# Patient Record
Sex: FEMALE | Race: WHITE | NOT HISPANIC OR LATINO | Employment: OTHER | URBAN - METROPOLITAN AREA
[De-identification: names, ages, dates, MRNs, and addresses within clinical notes are randomized per-mention and may not be internally consistent; named-entity substitution may affect disease eponyms.]

---

## 2018-07-19 ENCOUNTER — OFFICE VISIT (OUTPATIENT)
Dept: MEDICAL GROUP | Facility: PHYSICIAN GROUP | Age: 83
End: 2018-07-19
Payer: MEDICARE

## 2018-07-19 VITALS
OXYGEN SATURATION: 95 % | HEIGHT: 65 IN | SYSTOLIC BLOOD PRESSURE: 124 MMHG | TEMPERATURE: 98.1 F | WEIGHT: 153 LBS | BODY MASS INDEX: 25.49 KG/M2 | RESPIRATION RATE: 16 BRPM | DIASTOLIC BLOOD PRESSURE: 72 MMHG | HEART RATE: 77 BPM

## 2018-07-19 DIAGNOSIS — J43.8 OTHER EMPHYSEMA (HCC): ICD-10-CM

## 2018-07-19 DIAGNOSIS — E03.9 ACQUIRED HYPOTHYROIDISM: ICD-10-CM

## 2018-07-19 DIAGNOSIS — R73.09 ELEVATED GLUCOSE: ICD-10-CM

## 2018-07-19 DIAGNOSIS — M15.9 PRIMARY OSTEOARTHRITIS INVOLVING MULTIPLE JOINTS: ICD-10-CM

## 2018-07-19 DIAGNOSIS — E78.2 MIXED HYPERLIPIDEMIA: ICD-10-CM

## 2018-07-19 DIAGNOSIS — R60.0 BILATERAL LOWER EXTREMITY EDEMA: ICD-10-CM

## 2018-07-19 DIAGNOSIS — D22.9 MULTIPLE NEVI: ICD-10-CM

## 2018-07-19 DIAGNOSIS — I65.23 ATHEROSCLEROSIS OF BOTH CAROTID ARTERIES: ICD-10-CM

## 2018-07-19 DIAGNOSIS — K21.9 GASTROESOPHAGEAL REFLUX DISEASE WITHOUT ESOPHAGITIS: ICD-10-CM

## 2018-07-19 PROCEDURE — 99204 OFFICE O/P NEW MOD 45 MIN: CPT | Performed by: NURSE PRACTITIONER

## 2018-07-19 RX ORDER — ATORVASTATIN CALCIUM 20 MG/1
20 TABLET, FILM COATED ORAL DAILY
Refills: 2 | COMMUNITY
Start: 2018-05-02 | End: 2019-05-28 | Stop reason: SDUPTHER

## 2018-07-19 RX ORDER — MELOXICAM 7.5 MG/1
7.5 TABLET ORAL DAILY
Refills: 0 | COMMUNITY
Start: 2018-04-30 | End: 2018-08-30 | Stop reason: SDUPTHER

## 2018-07-19 RX ORDER — OMEPRAZOLE 20 MG/1
20 CAPSULE, DELAYED RELEASE ORAL DAILY
Refills: 1 | COMMUNITY
Start: 2018-06-15 | End: 2019-03-01 | Stop reason: SDUPTHER

## 2018-07-19 RX ORDER — ESCITALOPRAM OXALATE 10 MG/1
10 TABLET ORAL DAILY
Refills: 0 | COMMUNITY
Start: 2018-06-15 | End: 2018-09-13 | Stop reason: SDUPTHER

## 2018-07-19 RX ORDER — LEVOTHYROXINE SODIUM 0.03 MG/1
25 TABLET ORAL DAILY
Refills: 0 | COMMUNITY
Start: 2018-06-15 | End: 2019-03-01 | Stop reason: SDUPTHER

## 2018-07-19 RX ORDER — METOPROLOL SUCCINATE 25 MG/1
25 TABLET, EXTENDED RELEASE ORAL DAILY
Refills: 2 | COMMUNITY
Start: 2018-05-02 | End: 2019-04-02

## 2018-07-19 RX ORDER — BUDESONIDE AND FORMOTEROL FUMARATE DIHYDRATE 160; 4.5 UG/1; UG/1
2 AEROSOL RESPIRATORY (INHALATION) 2 TIMES DAILY
COMMUNITY
End: 2019-05-28

## 2018-07-19 RX ORDER — ERYTHROMYCIN 5 MG/G
5 OINTMENT OPHTHALMIC
Refills: 5 | COMMUNITY
Start: 2018-05-17 | End: 2019-05-28

## 2018-07-19 ASSESSMENT — PATIENT HEALTH QUESTIONNAIRE - PHQ9
5. POOR APPETITE OR OVEREATING: 0 - NOT AT ALL
CLINICAL INTERPRETATION OF PHQ2 SCORE: 4
SUM OF ALL RESPONSES TO PHQ QUESTIONS 1-9: 9

## 2018-07-19 ASSESSMENT — PAIN SCALES - GENERAL: PAINLEVEL: NO PAIN

## 2018-07-19 NOTE — ASSESSMENT & PLAN NOTE
Chronic in nature.  Stable.  Patient was decreased from 40-20 mg of atorvastatin and triglycerides are 222 on last check plan to repeat these labs may increase dose.

## 2018-07-19 NOTE — LETTER
[facility]  OSMAR PaizP.RALEXIS  [address]  [place] [address]  Fax: [phone]   Authorization for Release/Disclosure of   Protected Health Information   Name: THALIA HARDIN : 1931 SSN: [id number]   Address: [address] [address] Phone:    [phone] (home)    I authorize the entity listed below to release/disclose the PHI below to:   [facility]/ASIM Piaz.P.R.NAVDEEP and ASIM Paiz.P.RALEXIS   Provider or Entity Name:  Gastroenterology    Address   City, [facility], [facility]   Phone:  [phone]     Fax:     Reason for request: continuity of care   Information to be released:    [  ] LAST COLONOSCOPY,  including any PATH REPORT and follow-up  [  ] LAST FIT/COLOGUARD RESULT [  ] LAST DEXA  [  ] LAST MAMMOGRAM  [  ] LAST PAP  [  ] LAST LABS [  ] RETINA EXAM REPORT  [  ] IMMUNIZATION RECORDS  [ x ] Release all info      [  ] Check here and initial the line next to each item to release ALL health information INCLUDING  _____ Care and treatment for drug and / or alcohol abuse  _____ HIV testing, infection status, or AIDS  _____ Genetic Testing    DATES OF SERVICE OR TIME PERIOD TO BE DISCLOSED: _____________  I understand and acknowledge that:  * This Authorization may be revoked at any time by you in writing, except if your health information has already been used or disclosed.  * Your health information that will be used or disclosed as a result of you signing this authorization could be re-disclosed by the recipient. If this occurs, your re-disclosed health information may no longer be protected by State or Federal laws.  * You may refuse to sign this Authorization. Your refusal will not affect your ability to obtain treatment.  * This Authorization becomes effective upon signing and will  on (date) __________.      If no date is indicated, this Authorization will  one (1) year from the signature date.    Name: Thalia  Sol    Signature:   Date:     7/19/2018       PLEASE FAX REQUESTED RECORDS BACK TO: [phone]

## 2018-07-19 NOTE — LETTER
[facility]  OSMAR PaizP.RALEXIS  [address]  [place] [address]  Fax: [phone]   Authorization for Release/Disclosure of   Protected Health Information   Name: THALIA HARDIN : 1931 SSN: [id number]   Address: [address] [address] Phone:    [phone] (home)    I authorize the entity listed below to release/disclose the PHI below to:   [facility]/ASIM Paiz.P.R.MAGGIE. and ASIM Paiz.P.RALEXIS   Provider or Entity Name:  Domingo Garcia    Address   City, [facility], [facility]   Phone:  [phone]    Fax:     Reason for request: continuity of care   Information to be released:    [  ] LAST COLONOSCOPY,  including any PATH REPORT and follow-up  [  ] LAST FIT/COLOGUARD RESULT [  ] LAST DEXA  [  ] LAST MAMMOGRAM  [  ] LAST PAP  [  ] LAST LABS [  ] RETINA EXAM REPORT  [  ] IMMUNIZATION RECORDS  [x  ] Release all info      [  ] Check here and initial the line next to each item to release ALL health information INCLUDING  _____ Care and treatment for drug and / or alcohol abuse  _____ HIV testing, infection status, or AIDS  _____ Genetic Testing    DATES OF SERVICE OR TIME PERIOD TO BE DISCLOSED: _____________  I understand and acknowledge that:  * This Authorization may be revoked at any time by you in writing, except if your health information has already been used or disclosed.  * Your health information that will be used or disclosed as a result of you signing this authorization could be re-disclosed by the recipient. If this occurs, your re-disclosed health information may no longer be protected by State or Federal laws.  * You may refuse to sign this Authorization. Your refusal will not affect your ability to obtain treatment.  * This Authorization becomes effective upon signing and will  on (date) __________.      If no date is indicated, this Authorization will  one (1) year from the signature date.    Name: Thalia  Sol    Signature:   Date:     7/19/2018       PLEASE FAX REQUESTED RECORDS BACK TO: [phone]

## 2018-07-19 NOTE — ASSESSMENT & PLAN NOTE
Chronic in nature.  Stable.  Patient uses over-the-counter medications as needed.  Multiple joints, shoulder, neck. Worse on left.  States that she does have some discomfort in her lower neck area, left shoulder.  States that over-the-counter medication works well.

## 2018-07-19 NOTE — ASSESSMENT & PLAN NOTE
"Chronic in nature.  Stable.  Patient takes Lasix for this issue states that she takes it \"most days\".  States she is unsure if she was supposed to be taking it daily states that they have noticed some issues with blood pressures being too low.  Plan to obtain cardiology records.  "

## 2018-07-19 NOTE — LETTER
[facility]  OSMAR PaizP.RALEXIS  [address]  [place] [address]  Fax: [phone]   Authorization for Release/Disclosure of   Protected Health Information   Name: THALIA HARDIN : 1931 SSN: [id number]   Address: [address] [address] Phone:    [phone] (home)    I authorize the entity listed below to release/disclose the PHI below to:   [facility]/ASIM Paiz.P.R.NAVDEEP and ASIM Paiz.P.RALEXIS   Provider or Entity Name:  Og Sheehan    [facility]   Phone:  [phone]    Fax:     Reason for request: continuity of care   Information to be released:    [  ] LAST COLONOSCOPY,  including any PATH REPORT and follow-up  [  ] LAST FIT/COLOGUARD RESULT [  ] LAST DEXA  [  ] LAST MAMMOGRAM  [  ] LAST PAP  [  ] LAST LABS [  ] RETINA EXAM REPORT  [  ] IMMUNIZATION RECORDS  [ x ] Release all info      [  ] Check here and initial the line next to each item to release ALL health information INCLUDING  _____ Care and treatment for drug and / or alcohol abuse  _____ HIV testing, infection status, or AIDS  _____ Genetic Testing    DATES OF SERVICE OR TIME PERIOD TO BE DISCLOSED: _____________  I understand and acknowledge that:  * This Authorization may be revoked at any time by you in writing, except if your health information has already been used or disclosed.  * Your health information that will be used or disclosed as a result of you signing this authorization could be re-disclosed by the recipient. If this occurs, your re-disclosed health information may no longer be protected by State or Federal laws.  * You may refuse to sign this Authorization. Your refusal will not affect your ability to obtain treatment.  * This Authorization becomes effective upon signing and will  on (date) __________.      If no date is indicated, this Authorization will  one (1) year from the signature date.    Name: Thalia  Sol    Signature:   Date:     7/19/2018       PLEASE FAX REQUESTED RECORDS BACK TO: [phone]

## 2018-07-19 NOTE — ASSESSMENT & PLAN NOTE
"Chronic in nature.  Stable.  Patient uses Symbicort, \"sometimes\".  Denies issues with shortness of breath, cough, fever, chills.  Quit smoking in 2013.  "

## 2018-07-19 NOTE — LETTER
[facility]  OSMAR PaizP.RALEXIS  [address]  [place] [address]  Fax: [phone]   Authorization for Release/Disclosure of   Protected Health Information   Name: THALIA HARDIN : 1931 SSN: [id number]   Address: [address] [address] Phone:    [phone] (home)    I authorize the entity listed below to release/disclose the PHI below to:   [facility]/Marcelo Isaacs A.P.R.MAGGIE. and ASIM Paiz.P.RALEXIS   Provider or Entity Name:  Ling Montemayor    Address   City, [facility], [facility]   Phone:  [phone]    Fax:     Reason for request: continuity of care   Information to be released:    [  ] LAST COLONOSCOPY,  including any PATH REPORT and follow-up  [  ] LAST FIT/COLOGUARD RESULT [  ] LAST DEXA  [  ] LAST MAMMOGRAM  [  ] LAST PAP  [  ] LAST LABS [  ] RETINA EXAM REPORT  [  ] IMMUNIZATION RECORDS  [ x ] Release all info      [  ] Check here and initial the line next to each item to release ALL health information INCLUDING  _____ Care and treatment for drug and / or alcohol abuse  _____ HIV testing, infection status, or AIDS  _____ Genetic Testing    DATES OF SERVICE OR TIME PERIOD TO BE DISCLOSED: _____________  I understand and acknowledge that:  * This Authorization may be revoked at any time by you in writing, except if your health information has already been used or disclosed.  * Your health information that will be used or disclosed as a result of you signing this authorization could be re-disclosed by the recipient. If this occurs, your re-disclosed health information may no longer be protected by State or Federal laws.  * You may refuse to sign this Authorization. Your refusal will not affect your ability to obtain treatment.  * This Authorization becomes effective upon signing and will  on (date) __________.      If no date is indicated, this Authorization will  one (1) year from the signature date.    Name: Thalia  Sol    Signature:   Date:     7/19/2018       PLEASE FAX REQUESTED RECORDS BACK TO: [phone]

## 2018-07-19 NOTE — PROGRESS NOTES
"Chief Complaint   Patient presents with   • Establish Care     New Patient        HISTORY OF THE PRESENT ILLNESS: This is a 87 y.o. female new patient to our practice. This pleasant patient is here today to establish care, patient is a poor historian.     Osteoarthritis of multiple joints  Chronic in nature.  Stable.  Patient uses over-the-counter medications as needed.  Multiple joints, shoulder, neck. Worse on left.  States that she does have some discomfort in her lower neck area, left shoulder.  States that over-the-counter medication works well.    Acquired hypothyroidism  Chronic in nature. Stable. Patient is taking Synthroid 25 mcg daily. Taking medicine as directed. Denies palpitations, skin changes, temperature intolerance, changes in bowel habits.      Atherosclerosis of both carotid arteries  Chronic in nature.  Patient states that her last ultrasound indicated <40% occlusion.  Patient is unsure follow-up was recommended for this.  Patient is seeing cardiology but patient is not sure why she was seeing cardiology.  States that they were following her atherosclerosis.    Bilateral lower extremity edema  Chronic in nature.  Stable.  Patient takes Lasix for this issue states that she takes it \"most days\".  States she is unsure if she was supposed to be taking it daily states that they have noticed some issues with blood pressures being too low.  Plan to obtain cardiology records.    Gastroesophageal reflux disease without esophagitis  Taking medication daily. No early satiety, unintentional weight loss, choking, persistent burning pain in chest or upper abdomen.      Mixed hyperlipidemia  Chronic in nature.  Stable.  Patient was decreased from 40-20 mg of atorvastatin and triglycerides are 222 on last check plan to repeat these labs may increase dose.    Other emphysema (HCC)  Chronic in nature.  Stable.  Patient uses Symbicort, \"sometimes\".  Denies issues with shortness of breath, cough, fever, chills.  " Quit smoking in .      Past Medical History:   Diagnosis Date   • COPD (chronic obstructive pulmonary disease) (HCC)    • GERD (gastroesophageal reflux disease)    • Heart palpitations    • Hyperlipidemia    • Osteoarthritis    • Thyroid disease        Past Surgical History:   Procedure Laterality Date   • HIP REPLACEMENT, TOTAL Left        Family Status   Relation Status   • Mother      Family History   Problem Relation Age of Onset   • Stroke Mother 69       Social History   Substance Use Topics   • Smoking status: Former Smoker     Types: Cigarettes     Quit date: 2013   • Smokeless tobacco: Never Used   • Alcohol use 6.0 oz/week     10 Glasses of wine per week       Allergies: Penicillins    Current Outpatient Prescriptions Ordered in [facility]   Medication Sig Dispense Refill   • omeprazole (PRILOSEC) 20 MG delayed-release capsule Take 20 mg by mouth every day.  1   • meloxicam (MOBIC) 7.5 MG Tab Take 7.5 mg by mouth every day.  0   • levothyroxine (SYNTHROID) 25 MCG Tab Take 25 mcg by mouth every day.  0   • metoprolol SR (TOPROL XL) 25 MG TABLET SR 24 HR Take 25 mg by mouth every day.  2   • atorvastatin (LIPITOR) 20 MG Tab Take 20 mg by mouth every day.  2   • escitalopram (LEXAPRO) 10 MG Tab Take 10 mg by mouth every day.  0   • erythromycin 5 MG/GM Ointment 5 mg.  5   • budesonide-formoterol (SYMBICORT) 160-4.5 MCG/ACT Aerosol Inhale 2 Puffs by mouth 2 Times a Day.       No current [facility]-ordered facility-administered medications on file.        Review of Systems   Constitutional:  Negative for fever, chills, weight loss and malaise/fatigue.   HENT:  Negative for ear pain, nosebleeds, congestion, sore throat and neck pain.    Eyes:  Negative for blurred vision.   Respiratory:  Negative for cough, sputum production, shortness of breath and wheezing.    Cardiovascular:  Negative for chest pain, palpitations, orthopnea and leg swelling.   Gastrointestinal:  Negative for heartburn, nausea, vomiting  "and abdominal pain.   Genitourinary:  Negative for dysuria, urgency and frequency.   Musculoskeletal: Positive for myalgias, back pain and joint pain.   Skin:  Negative for rash and itching.   Neurological:  Negative for dizziness, tingling, tremors, sensory change, focal weakness and headaches.   Endo/Heme/Allergies:  Does not bruise/bleed easily.   Psychiatric/Behavioral:  Negative for depression, anxiety, or memory loss.     All other systems reviewed and are negative except as in HPI.    Exam: Blood pressure 124/72, pulse 77, temperature 36.7 °C (98.1 °F), resp. rate 16, height 1.651 m (5' 5\"), weight 69.4 kg (153 lb), SpO2 95 %, not currently breastfeeding.  General:  Normal appearing. No distress.  HEENT:  Normocephalic. Eyes conjunctiva clear lids without ptosis, pupils equal and reactive to light accommodation, ears normal shape and contour, canals are clear bilaterally, tympanic membranes are benign, nasal mucosa benign, oropharynx is without erythema, edema or exudates.   Neck:  Supple without JVD or bruit. Thyroid is not enlarged.  Pulmonary:  Clear to ausculation.  Normal effort. No rales, ronchi, or wheezing.  Cardiovascular:  Regular rate and rhythm without murmur. Carotid and radial pulses are intact and equal bilaterally.  Neurologic:  Grossly nonfocal  Lymph:  No cervical, supraclavicular or axillary lymph nodes are palpable  Skin:  Warm and dry.  No obvious lesions.  Musculoskeletal:  Normal gait. No extremity cyanosis, clubbing, or edema.  Psych:  Normal mood and affect. Alert and oriented x3. Judgment and insight is normal.    PLAN:    1. Primary osteoarthritis involving multiple joints  Continue current over-the-counter treatment.    2. Acquired hypothyroidism  Continue current medication, update labs  - TSH; Future    3. Mixed hyperlipidemia  Continue current medication, update labs consider increasing medication.  - COMP METABOLIC PANEL; Future  - LIPID PROFILE; Future    4. Atherosclerosis of " both carotid arteries  Will obtain cardiology records    5. Gastroesophageal reflux disease without esophagitis  Continue current medication.  - COMP METABOLIC PANEL; Future    6. Bilateral lower extremity edema  Will obtain cardiology records.  - CBC WITH DIFFERENTIAL; Future  - COMP METABOLIC PANEL; Future    7. Other emphysema (HCC)  Kidney current medication.    8. Elevated glucose  - HEMOGLOBIN A1C; Future    9. Multiple nevi  Patient has multiple nevi on her bilateral arms states she was following up with dermatology and is requesting referral.  - REFERRAL TO DERMATOLOGY    Follow-up in September to discuss labs, go over records. Patient is encouraged to be seen in the emergency room for chest pain, palpitations, shortness of breath, dizziness, severe abdominal pain or other concerning symptoms.    Please note that this dictation was created using voice recognition software. I have made every reasonable attempt to correct obvious errors, but I expect that there are errors of grammar and possibly content that I did not discover before finalizing the note.      Assessment/Plan  1. Primary osteoarthritis involving multiple joints     2. Acquired hypothyroidism  TSH   3. Mixed hyperlipidemia  COMP METABOLIC PANEL    LIPID PROFILE   4. Atherosclerosis of both carotid arteries     5. Gastroesophageal reflux disease without esophagitis  COMP METABOLIC PANEL   6. Bilateral lower extremity edema  CBC WITH DIFFERENTIAL    COMP METABOLIC PANEL   7. Other emphysema (HCC)     8. Elevated glucose  HEMOGLOBIN A1C   9. Multiple nevi  REFERRAL TO DERMATOLOGY         I have placed the below orders and discussed them with an approved delegating provider. The MA is performing the below orders under the direction of Dr. Ramey.

## 2018-07-19 NOTE — LETTER
[facility]  OSMAR PaizP.RALEXIS  [address]  [place] [address]  Fax: [phone]   Authorization for Release/Disclosure of   Protected Health Information   Name: THALIA HARDIN : 1931 SSN: [id number]   Address: [address] [address] Phone:    [phone] (home)    I authorize the entity listed below to release/disclose the PHI below to:   [facility]/ASIM Paiz.P.R.NAVDEEP and OSMAR PaizP.RALEXIS   Provider or Entity Name:  Sari Alan Edward     Address   City, State, [facility]   Phone:  [phone]    Fax:     Reason for request: continuity of care   Information to be released:    [  ] LAST COLONOSCOPY,  including any PATH REPORT and follow-up  [  ] LAST FIT/COLOGUARD RESULT [  ] LAST DEXA  [  ] LAST MAMMOGRAM  [  ] LAST PAP  [  x] LAST LABS [  ] RETINA EXAM REPORT  [  ] IMMUNIZATION RECORDS  [  ] Release all info      [  ] Check here and initial the line next to each item to release ALL health information INCLUDING  _____ Care and treatment for drug and / or alcohol abuse  _____ HIV testing, infection status, or AIDS  _____ Genetic Testing    DATES OF SERVICE OR TIME PERIOD TO BE DISCLOSED: _____________  I understand and acknowledge that:  * This Authorization may be revoked at any time by you in writing, except if your health information has already been used or disclosed.  * Your health information that will be used or disclosed as a result of you signing this authorization could be re-disclosed by the recipient. If this occurs, your re-disclosed health information may no longer be protected by State or Federal laws.  * You may refuse to sign this Authorization. Your refusal will not affect your ability to obtain treatment.  * This Authorization becomes effective upon signing and will  on (date) __________.      If no date is indicated, this Authorization will  one (1) year from the signature date.    Name: Thalia  Sol    Signature:   Date:     7/19/2018       PLEASE FAX REQUESTED RECORDS BACK TO: [phone]

## 2018-07-19 NOTE — ASSESSMENT & PLAN NOTE
Chronic in nature. Stable. Patient is taking Synthroid 25 mcg daily. Taking medicine as directed. Denies palpitations, skin changes, temperature intolerance, changes in bowel habits.

## 2018-07-19 NOTE — LETTER
[facility]  OSMAR PaizP.RALEXIS  [address]  [place] [address]  Fax: [phone]   Authorization for Release/Disclosure of   Protected Health Information   Name: THALIA HARDIN : 1931 SSN: [id number]   Address: [address] [address] Phone:    [phone] (home)    I authorize the entity listed below to release/disclose the PHI below to:   [facility]/Marcelo Isaacs A.P.R.MAGGIE. and ASIM Paiz.P.RALEXIS   Provider or Entity Name:  Urology     Address   City, State, Zip   Phone:  [phone]    Fax:     Reason for request: continuity of care   Information to be released:    [  ] LAST COLONOSCOPY,  including any PATH REPORT and follow-up  [  ] LAST FIT/COLOGUARD RESULT [  ] LAST DEXA  [  ] LAST MAMMOGRAM  [  ] LAST PAP  [  ] LAST LABS [  ] RETINA EXAM REPORT  [  ] IMMUNIZATION RECORDS  [ x ] Release all info      [  ] Check here and initial the line next to each item to release ALL health information INCLUDING  _____ Care and treatment for drug and / or alcohol abuse  _____ HIV testing, infection status, or AIDS  _____ Genetic Testing    DATES OF SERVICE OR TIME PERIOD TO BE DISCLOSED: _____________  I understand and acknowledge that:  * This Authorization may be revoked at any time by you in writing, except if your health information has already been used or disclosed.  * Your health information that will be used or disclosed as a result of you signing this authorization could be re-disclosed by the recipient. If this occurs, your re-disclosed health information may no longer be protected by State or Federal laws.  * You may refuse to sign this Authorization. Your refusal will not affect your ability to obtain treatment.  * This Authorization becomes effective upon signing and will  on (date) __________.      If no date is indicated, this Authorization will  one (1) year from the signature date.    Name: Thalia  Sol    Signature:   Date:     7/19/2018       PLEASE FAX REQUESTED RECORDS BACK TO: [phone]

## 2018-07-19 NOTE — ASSESSMENT & PLAN NOTE
Chronic in nature.  Patient states that her last ultrasound indicated <40% occlusion.  Patient is unsure follow-up was recommended for this.  Patient is seeing cardiology but patient is not sure why she was seeing cardiology.  States that they were following her atherosclerosis.

## 2018-08-30 RX ORDER — MELOXICAM 7.5 MG/1
7.5 TABLET ORAL DAILY
Qty: 30 TAB | Refills: 0 | Status: SHIPPED | OUTPATIENT
Start: 2018-08-30 | End: 2018-09-25 | Stop reason: SDUPTHER

## 2018-08-31 ENCOUNTER — TELEPHONE (OUTPATIENT)
Dept: MEDICAL GROUP | Facility: PHYSICIAN GROUP | Age: 83
End: 2018-08-31

## 2018-08-31 NOTE — TELEPHONE ENCOUNTER
VOICEMAIL  1. Caller Name: Thalia Horton                        Call Back Number: [phone] (home)       2. Message: Called and let patient know she can go to [facility] to get blood drawn prior to 09/13 appointment. Let patient know if she goes to another lab I can print or mail her the orders. Also informed patient labs are fasting 8 hours. LM     3. Patient approves office to leave a detailed voicemail/MyChart message: N\A

## 2018-09-06 ENCOUNTER — HOSPITAL ENCOUNTER (OUTPATIENT)
Dept: LAB | Facility: MEDICAL CENTER | Age: 83
End: 2018-09-06
Attending: NURSE PRACTITIONER
Payer: MEDICARE

## 2018-09-06 DIAGNOSIS — E78.2 MIXED HYPERLIPIDEMIA: ICD-10-CM

## 2018-09-06 DIAGNOSIS — R73.09 ELEVATED GLUCOSE: ICD-10-CM

## 2018-09-06 DIAGNOSIS — R60.0 BILATERAL LOWER EXTREMITY EDEMA: ICD-10-CM

## 2018-09-06 DIAGNOSIS — K21.9 GASTROESOPHAGEAL REFLUX DISEASE WITHOUT ESOPHAGITIS: ICD-10-CM

## 2018-09-06 DIAGNOSIS — E03.9 ACQUIRED HYPOTHYROIDISM: ICD-10-CM

## 2018-09-06 LAB
ALBUMIN SERPL BCP-MCNC: 4.3 G/DL (ref 3.2–4.9)
ALBUMIN/GLOB SERPL: 1.4 G/DL
ALP SERPL-CCNC: 51 U/L (ref 30–99)
ALT SERPL-CCNC: 20 U/L (ref 2–50)
ANION GAP SERPL CALC-SCNC: 7 MMOL/L (ref 0–11.9)
AST SERPL-CCNC: 22 U/L (ref 12–45)
BILIRUB SERPL-MCNC: 0.8 MG/DL (ref 0.1–1.5)
BUN SERPL-MCNC: 14 MG/DL (ref 8–22)
CALCIUM SERPL-MCNC: 9.5 MG/DL (ref 8.5–10.5)
CHLORIDE SERPL-SCNC: 105 MMOL/L (ref 96–112)
CHOLEST SERPL-MCNC: 201 MG/DL (ref 100–199)
CO2 SERPL-SCNC: 28 MMOL/L (ref 20–33)
CREAT SERPL-MCNC: 0.71 MG/DL (ref 0.5–1.4)
EST. AVERAGE GLUCOSE BLD GHB EST-MCNC: 126 MG/DL
FASTING STATUS PATIENT QL REPORTED: NORMAL
GLOBULIN SER CALC-MCNC: 3 G/DL (ref 1.9–3.5)
GLUCOSE SERPL-MCNC: 134 MG/DL (ref 65–99)
HBA1C MFR BLD: 6 % (ref 0–5.6)
HDLC SERPL-MCNC: 55 MG/DL
LDLC SERPL CALC-MCNC: 106 MG/DL
POTASSIUM SERPL-SCNC: 4.5 MMOL/L (ref 3.6–5.5)
PROT SERPL-MCNC: 7.3 G/DL (ref 6–8.2)
SODIUM SERPL-SCNC: 140 MMOL/L (ref 135–145)
TRIGL SERPL-MCNC: 199 MG/DL (ref 0–149)
TSH SERPL DL<=0.005 MIU/L-ACNC: 2.32 UIU/ML (ref 0.38–5.33)

## 2018-09-06 PROCEDURE — 36415 COLL VENOUS BLD VENIPUNCTURE: CPT

## 2018-09-06 PROCEDURE — 83036 HEMOGLOBIN GLYCOSYLATED A1C: CPT | Mod: GA

## 2018-09-06 PROCEDURE — 84443 ASSAY THYROID STIM HORMONE: CPT

## 2018-09-06 PROCEDURE — 80053 COMPREHEN METABOLIC PANEL: CPT

## 2018-09-06 PROCEDURE — 80061 LIPID PANEL: CPT

## 2018-09-13 ENCOUNTER — OFFICE VISIT (OUTPATIENT)
Dept: MEDICAL GROUP | Facility: PHYSICIAN GROUP | Age: 83
End: 2018-09-13
Payer: MEDICARE

## 2018-09-13 VITALS
WEIGHT: 152.8 LBS | OXYGEN SATURATION: 93 % | RESPIRATION RATE: 16 BRPM | DIASTOLIC BLOOD PRESSURE: 70 MMHG | TEMPERATURE: 97.5 F | HEIGHT: 65 IN | SYSTOLIC BLOOD PRESSURE: 122 MMHG | HEART RATE: 78 BPM | BODY MASS INDEX: 25.46 KG/M2

## 2018-09-13 DIAGNOSIS — E03.9 ACQUIRED HYPOTHYROIDISM: ICD-10-CM

## 2018-09-13 DIAGNOSIS — I65.23 ATHEROSCLEROSIS OF BOTH CAROTID ARTERIES: ICD-10-CM

## 2018-09-13 DIAGNOSIS — Z86.79 HISTORY OF ANGINA PECTORIS: ICD-10-CM

## 2018-09-13 DIAGNOSIS — B07.0 PLANTAR WART: ICD-10-CM

## 2018-09-13 DIAGNOSIS — R60.0 BILATERAL LOWER EXTREMITY EDEMA: ICD-10-CM

## 2018-09-13 DIAGNOSIS — Z23 NEED FOR VACCINATION: ICD-10-CM

## 2018-09-13 DIAGNOSIS — F33.41 RECURRENT MAJOR DEPRESSIVE DISORDER, IN PARTIAL REMISSION (HCC): ICD-10-CM

## 2018-09-13 PROCEDURE — G0008 ADMIN INFLUENZA VIRUS VAC: HCPCS | Performed by: NURSE PRACTITIONER

## 2018-09-13 PROCEDURE — 99214 OFFICE O/P EST MOD 30 MIN: CPT | Mod: 25 | Performed by: NURSE PRACTITIONER

## 2018-09-13 PROCEDURE — 90662 IIV NO PRSV INCREASED AG IM: CPT | Performed by: NURSE PRACTITIONER

## 2018-09-13 RX ORDER — ESCITALOPRAM OXALATE 10 MG/1
10 TABLET ORAL DAILY
Qty: 90 TAB | Refills: 3 | Status: SHIPPED | OUTPATIENT
Start: 2018-09-13 | End: 2019-05-28 | Stop reason: SDUPTHER

## 2018-09-13 ASSESSMENT — PAIN SCALES - GENERAL: PAINLEVEL: NO PAIN

## 2018-09-13 NOTE — ASSESSMENT & PLAN NOTE
Chronic in nature.  Followed by cardiology in the past.  Patient was recommended every six-month follow-up with cardiology, related to this as well as history of chest pain noted and cardiology notes.  Plan to refer patient back to cardiology per her previous cardiologist recommendation.

## 2018-09-13 NOTE — ASSESSMENT & PLAN NOTE
Resolved at this time.  Patient states that she monitors this and is feeling well at this time.  Documentation regarding heart failure and previous cardiology note.

## 2018-09-13 NOTE — ASSESSMENT & PLAN NOTE
Chronic in nature.  Stable. Labwork reviewed up to date. Taking medicine as directed. Denies palpitations, skin changes, temperature intolerance, changes in bowel habits.

## 2018-09-13 NOTE — ASSESSMENT & PLAN NOTE
She states that she has not had any chest pain recently.  Previous cardiology note to document issues with angina that were being followed by cardiologist.

## 2018-09-13 NOTE — PROGRESS NOTES
Chief Complaint   Patient presents with   • Results     labs       HISTORY OF PRESENT ILLNESS: Patient is a 87 y.o. female established patient who presents today to follow-up regarding labs.    Acquired hypothyroidism  Chronic in nature.  Stable. Labwork reviewed up to date. Taking medicine as directed. Denies palpitations, skin changes, temperature intolerance, changes in bowel habits.      Atherosclerosis of both carotid arteries  Chronic in nature.  Followed by cardiology in the past.  Patient was recommended every six-month follow-up with cardiology, related to this as well as history of chest pain noted and cardiology notes.  Plan to refer patient back to cardiology per her previous cardiologist recommendation.    Bilateral lower extremity edema  Resolved at this time.  Patient states that she monitors this and is feeling well at this time.  Documentation regarding heart failure and previous cardiology note.    History of angina pectoris  She states that she has not had any chest pain recently.  Previous cardiology note to document issues with angina that were being followed by cardiologist.      Patient Active Problem List    Diagnosis Date Noted   • History of angina pectoris 09/13/2018   • Osteoarthritis of multiple joints 07/19/2018   • Acquired hypothyroidism 07/19/2018   • Mixed hyperlipidemia 07/19/2018   • Atherosclerosis of both carotid arteries 07/19/2018   • Gastroesophageal reflux disease without esophagitis 07/19/2018   • Bilateral lower extremity edema 07/19/2018   • Other emphysema (HCC) 07/19/2018       Allergies:Penicillins    Current Outpatient Prescriptions   Medication Sig Dispense Refill   • escitalopram (LEXAPRO) 10 MG Tab Take 1 Tab by mouth every day. 90 Tab 3   • meloxicam (MOBIC) 7.5 MG Tab Take 1 Tab by mouth every day. 30 Tab 0   • omeprazole (PRILOSEC) 20 MG delayed-release capsule Take 20 mg by mouth every day.  1   • levothyroxine (SYNTHROID) 25 MCG Tab Take 25 mcg by mouth every  "day.  0   • metoprolol SR (TOPROL XL) 25 MG TABLET SR 24 HR Take 25 mg by mouth every day.  2   • atorvastatin (LIPITOR) 20 MG Tab Take 20 mg by mouth every day.  2   • erythromycin 5 MG/GM Ointment 5 mg.  5   • budesonide-formoterol (SYMBICORT) 160-4.5 MCG/ACT Aerosol Inhale 2 Puffs by mouth 2 Times a Day.       No current facility-administered medications for this visit.        Social History   Substance Use Topics   • Smoking status: Former Smoker     Types: Cigarettes     Quit date: 2013   • Smokeless tobacco: Never Used   • Alcohol use 6.0 oz/week     10 Glasses of wine per week       Family Status   Relation Status   • Mo      Family History   Problem Relation Age of Onset   • Stroke Mother 69       Review of Systems:   Constitutional:  Negative for fever, chills, weight loss and malaise/fatigue.   HEENT:  Negative for ear pain, nosebleeds, congestion, sore throat and neck pain.    Eyes:  Negative for blurred vision.   Respiratory:  Negative for cough, sputum production, shortness of breath and wheezing.    Cardiovascular:  Negative for chest pain, palpitations, orthopnea and leg swelling.   Gastrointestinal:  Negative for heartburn, nausea, vomiting and abdominal pain.   Genitourinary:  Negative for dysuria, urgency and frequency.   Musculoskeletal:  Negative for myalgias, back pain and joint pain.   Skin:  Negative for rash and itching.   Neurological:  Negative for dizziness, tingling, tremors, sensory change, focal weakness and headaches.   Endo/Heme/Allergies:  Does not bruise/bleed easily.   Psychiatric/Behavioral:  Negative for depression, suicidal ideas and memory loss.  The patient is not nervous/anxious and does not have insomnia.    All other systems reviewed and are negative except as in HPI.    Exam:  Blood pressure 122/70, pulse 78, temperature 36.4 °C (97.5 °F), resp. rate 16, height 1.651 m (5' 5\"), weight 69.3 kg (152 lb 12.8 oz), SpO2 93 %, not currently breastfeeding.  General:  " Normal appearing. No distress.  HEENT:  Normocephalic. Eyes conjunctiva clear lids without ptosis, pupils equal and reactive to light accommodation, ears normal shape and contour, canals are clear bilaterally, tympanic membranes are benign, nasal mucosa benign, oropharynx is without erythema, edema or exudates.   Neck:  Supple without JVD or bruit. Thyroid is not enlarged.  Pulmonary:  Clear to ausculation.  Normal effort. No rales, ronchi, or wheezing.  Cardiovascular:  Regular rate and rhythm without murmur. Carotid and radial pulses are intact and equal bilaterally.  Abdomen:  Soft, nontender, nondistended. Normal bowel sounds. Liver and spleen are not palpable  Neurologic:  Grossly nonfocal  Lymph:  No cervical, supraclavicular or axillary lymph nodes are palpable  Skin:  Warm and dry.  Plantar work greater than the size of a pencil eraser noticed on patient's left forearm this is rough.  Patient states it becomes frequently irritated and is requesting removal.  Musculoskeletal:  Normal gait. No extremity cyanosis, clubbing, or edema.  Psych:  Normal mood and affect. Alert and oriented x3. Judgment and insight is normal.    CRYOTHERAPY:  Discussed risks and benefits of cryotherapy. Patient verbally agreed. Three applications of liquid nitrogen were applied to plantar wart lesion on left forearm. Patient tolerated procedure well. Aftercare and return precaution instructions given.    PLAN:    1. Bilateral lower extremity edema  Resolved at this time.  Patient continues to monitor.    2. History of angina pectoris  Patient is referred to follow-up with cardiology previous cardiologist did recommend every 6 month follow-up regarding chest pain, atherosclerosis.  - REFERRAL TO CARDIOLOGY    3. Plantar wart  Cryotherapy    4. Atherosclerosis of both carotid arteries  - REFERRAL TO CARDIOLOGY    5. Need for vaccination  - INFLUENZA VACCINE, HIGH DOSE (65+ ONLY)    6. Recurrent major depressive disorder, in partial  remission (HCC)  She will continue Lexapro states she is doing well at this time but is still feeling sad when she wakes up in the morning.  Patient declines increase in dose.  - escitalopram (LEXAPRO) 10 MG Tab; Take 1 Tab by mouth every day.  Dispense: 90 Tab; Refill: 3    7. Acquired hypothyroidism  Current medication.    Follow-up in 6 months or sooner as needed. Patient is encouraged to be seen in the emergency room for chest pain, palpitations, shortness of breath, dizziness, severe abdominal pain or other concerning symptoms.    Please note that this dictation was created using voice recognition software. I have made every reasonable attempt to correct obvious errors, but I expect that there are errors of grammar and possibly content that I did not discover before finalizing the note.    Assessment/Plan:  1. Bilateral lower extremity edema     2. History of angina pectoris  REFERRAL TO CARDIOLOGY   3. Plantar wart     4. Atherosclerosis of both carotid arteries  REFERRAL TO CARDIOLOGY   5. Need for vaccination  INFLUENZA VACCINE, HIGH DOSE (65+ ONLY)   6. Recurrent major depressive disorder, in partial remission (HCC)  escitalopram (LEXAPRO) 10 MG Tab   7. Acquired hypothyroidism            I have placed the below orders and discussed them with an approved delegating provider. The MA is performing the below orders under the direction of Dr. Ramey.

## 2018-09-25 RX ORDER — MELOXICAM 7.5 MG/1
7.5 TABLET ORAL DAILY
Qty: 30 TAB | Refills: 0 | Status: SHIPPED | OUTPATIENT
Start: 2018-09-25 | End: 2018-11-20 | Stop reason: SDUPTHER

## 2018-11-29 ENCOUNTER — TELEPHONE (OUTPATIENT)
Dept: MEDICAL GROUP | Facility: PHYSICIAN GROUP | Age: 83
End: 2018-11-29

## 2018-11-29 DIAGNOSIS — J43.8 OTHER EMPHYSEMA (HCC): ICD-10-CM

## 2018-11-29 RX ORDER — ALBUTEROL SULFATE 90 UG/1
2 AEROSOL, METERED RESPIRATORY (INHALATION) EVERY 6 HOURS PRN
Qty: 8.5 G | Refills: 3 | Status: SHIPPED | OUTPATIENT
Start: 2018-11-29 | End: 2019-12-24

## 2018-11-29 NOTE — TELEPHONE ENCOUNTER
Phone Number Called: [phone] (home)       Message: Called and let Freddie know. LM    Left Message for patient to call back: no

## 2018-11-29 NOTE — TELEPHONE ENCOUNTER
Was the patient seen in the last year in this department? Yes    Does patient have an active prescription for medications requested? No     Received Request Via: Patient       Patient needs inhaler. Pharmacy said patient needs a different inhaler. Insurance will not cover. Son called and said medications that are covered are Ventolin, Pro Air, Pro Ventolin. Please Advise. LM

## 2018-12-31 RX ORDER — MELOXICAM 7.5 MG/1
TABLET ORAL
Qty: 30 TAB | Refills: 0 | Status: SHIPPED | OUTPATIENT
Start: 2018-12-31 | End: 2019-01-26 | Stop reason: SDUPTHER

## 2019-01-08 ENCOUNTER — HOSPITAL ENCOUNTER (OUTPATIENT)
Facility: MEDICAL CENTER | Age: 84
End: 2019-01-08
Attending: DERMATOLOGY
Payer: MEDICARE

## 2019-01-08 ENCOUNTER — OFFICE VISIT (OUTPATIENT)
Dept: MEDICAL GROUP | Facility: PHYSICIAN GROUP | Age: 84
End: 2019-01-08
Payer: MEDICARE

## 2019-01-08 ENCOUNTER — OFFICE VISIT (OUTPATIENT)
Dept: DERMATOLOGY | Facility: IMAGING CENTER | Age: 84
End: 2019-01-08
Payer: MEDICARE

## 2019-01-08 VITALS
SYSTOLIC BLOOD PRESSURE: 124 MMHG | WEIGHT: 152 LBS | TEMPERATURE: 97.7 F | OXYGEN SATURATION: 96 % | BODY MASS INDEX: 25.33 KG/M2 | HEIGHT: 65 IN | RESPIRATION RATE: 16 BRPM | HEART RATE: 83 BPM | DIASTOLIC BLOOD PRESSURE: 76 MMHG

## 2019-01-08 VITALS — WEIGHT: 152 LBS | BODY MASS INDEX: 25.95 KG/M2 | TEMPERATURE: 97.2 F | HEIGHT: 64 IN

## 2019-01-08 DIAGNOSIS — R47.01 APHASIA: ICD-10-CM

## 2019-01-08 DIAGNOSIS — I65.23 ATHEROSCLEROSIS OF BOTH CAROTID ARTERIES: ICD-10-CM

## 2019-01-08 DIAGNOSIS — M54.2 NECK PAIN: ICD-10-CM

## 2019-01-08 DIAGNOSIS — D48.5 NEOPLASM OF UNCERTAIN BEHAVIOR OF SKIN: ICD-10-CM

## 2019-01-08 DIAGNOSIS — M25.561 CHRONIC PAIN OF BOTH KNEES: ICD-10-CM

## 2019-01-08 DIAGNOSIS — L57.0 ACTINIC KERATOSES: ICD-10-CM

## 2019-01-08 DIAGNOSIS — L82.1 SEBORRHEIC KERATOSES: ICD-10-CM

## 2019-01-08 DIAGNOSIS — M25.562 CHRONIC PAIN OF BOTH KNEES: ICD-10-CM

## 2019-01-08 DIAGNOSIS — G89.29 CHRONIC PAIN OF BOTH KNEES: ICD-10-CM

## 2019-01-08 DIAGNOSIS — L81.4 LENTIGINES: ICD-10-CM

## 2019-01-08 DIAGNOSIS — M15.9 PRIMARY OSTEOARTHRITIS INVOLVING MULTIPLE JOINTS: ICD-10-CM

## 2019-01-08 PROCEDURE — 99203 OFFICE O/P NEW LOW 30 MIN: CPT | Mod: 25 | Performed by: DERMATOLOGY

## 2019-01-08 PROCEDURE — 11102 TANGNTL BX SKIN SINGLE LES: CPT | Performed by: DERMATOLOGY

## 2019-01-08 PROCEDURE — 17003 DESTRUCT PREMALG LES 2-14: CPT | Performed by: DERMATOLOGY

## 2019-01-08 PROCEDURE — 99214 OFFICE O/P EST MOD 30 MIN: CPT | Performed by: PHYSICIAN ASSISTANT

## 2019-01-08 PROCEDURE — 17000 DESTRUCT PREMALG LESION: CPT | Mod: 59 | Performed by: DERMATOLOGY

## 2019-01-08 PROCEDURE — 88305 TISSUE EXAM BY PATHOLOGIST: CPT

## 2019-01-08 ASSESSMENT — PATIENT HEALTH QUESTIONNAIRE - PHQ9: CLINICAL INTERPRETATION OF PHQ2 SCORE: 0

## 2019-01-08 ASSESSMENT — ENCOUNTER SYMPTOMS
CHILLS: 0
FEVER: 0

## 2019-01-08 NOTE — PROGRESS NOTES
Dermatology New Patient Visit    Chief Complaint   Patient presents with   • Establish Care       Subjective:     HPI:   Thalia Horton is a 87 y.o. female presenting for    Painful lesion on scalp.    HPI/location: crusty lesion on scalp  Time present: maybe one year  Painful lesion: Yes  Itching lesion: No  Enlarging lesion: unsure  Anything make it better or worse? Pushing it hurts    Several rough spots on the hands, forearms  Has had areas treated in the past with cryo, these are new  No itching/bleeding/pain    Brown/rough spots on the back & chest  Increasing over the years  No itching/bleeding/pain  Unsure if any have changed    History of skin cancer: No  History of precancers/actinic keratoses: Yes, Details: on scalp AK per Bx 10 years ago  History of biopsies:Yes, Details: on scalp AK, 10 years ago  History of blistering/severe sunburns:No  Family history of skin cancer:No  Family history of atypical moles:No        Past Medical History:   Diagnosis Date   • COPD (chronic obstructive pulmonary disease) (HCC)    • GERD (gastroesophageal reflux disease)    • Heart palpitations    • Hyperlipidemia    • Osteoarthritis    • Thyroid disease        Current Outpatient Prescriptions on File Prior to Visit   Medication Sig Dispense Refill   • meloxicam (MOBIC) 7.5 MG Tab TAKE 1 TABLET BY MOUTH EVERY DAY 30 Tab 0   • albuterol 108 (90 Base) MCG/ACT Aero Soln inhalation aerosol Inhale 2 Puffs by mouth every 6 hours as needed for Shortness of Breath. 8.5 g 3   • escitalopram (LEXAPRO) 10 MG Tab Take 1 Tab by mouth every day. 90 Tab 3   • omeprazole (PRILOSEC) 20 MG delayed-release capsule Take 20 mg by mouth every day.  1   • levothyroxine (SYNTHROID) 25 MCG Tab Take 25 mcg by mouth every day.  0   • metoprolol SR (TOPROL XL) 25 MG TABLET SR 24 HR Take 25 mg by mouth every day.  2   • atorvastatin (LIPITOR) 20 MG Tab Take 20 mg by mouth every day.  2   • erythromycin 5 MG/GM Ointment 5 mg.  5   •  "budesonide-formoterol (SYMBICORT) 160-4.5 MCG/ACT Aerosol Inhale 2 Puffs by mouth 2 Times a Day.       No current facility-administered medications on file prior to visit.        Allergies   Allergen Reactions   • Penicillins Vomiting       Family History   Problem Relation Age of Onset   • Stroke Mother 69       Social History     Social History   • Marital status:      Spouse name: N/A   • Number of children: N/A   • Years of education: N/A     Occupational History   • Not on file.     Social History Main Topics   • Smoking status: Former Smoker     Packs/day: 1.00     Years: 55.00     Types: Cigarettes     Quit date: 7/19/2013   • Smokeless tobacco: Never Used   • Alcohol use 6.0 oz/week     10 Glasses of wine per week   • Drug use: No   • Sexual activity: No     Other Topics Concern   • Not on file     Social History Narrative   • No narrative on file       Review of Systems   Constitutional: Negative for chills and fever.   Skin: Negative for itching and rash.   All other systems reviewed and are negative.       Objective:     A focused cutaneous exam was completed including: scalp, hair, ears, face, eyelids, conjunctiva, lips, neck, chest, abdomen, back, left and right upper extremities (including hands/digits and fingernails) with the following pertinent findings listed below. Remaining above-listed examined areas within normal limits / negative for rashes or lesions.    Temperature 36.2 °C (97.2 °F), height 1.626 m (5' 4\"), weight 68.9 kg (152 lb), not currently breastfeeding.    Physical Exam   Constitutional: She is oriented to person, place, and time and well-developed, well-nourished, and in no distress.   HENT:   Head: Normocephalic and atraumatic.       Eyes: Conjunctivae and lids are normal.   Neck: Normal range of motion.   Pulmonary/Chest: Effort normal.   Neurological: She is alert and oriented to person, place, and time.   Skin: Skin is warm and dry.        Psychiatric: Mood and affect " normal.   Vitals reviewed.      DATA: none applicable to review    Assessment and Plan:     1. Neoplasm of uncertain behavior of skin  Procedure Note   Procedure: Biopsy by shave technique  Location: as noted above  Size: as noted in exam  Preoperative diagnosis:AK, r/o scc  Risks, benefits and alternatives of procedure discussed and written informed consent obtained. Time out completed. Area of biopsy prepped with alcohol. Anesthesia with 1% lidocaine with epinephrine administered with 30 gauge needle. Shave biopsy of the site performed. Hemostasis achieved with pressure and aluminum chloride. Vaseline applied to wound with bandage. Patient tolerated procedure well and there were no complications. The specimen was sent to the pathology lab by the staff. Wound care was discussed.  - Pathology Specimen; Future    2. Actinic keratoses  CRYOTHERAPY:  Risks (including, but not limited to: hypo or hyperpigmentation, redness, blister, blood blister, recurrence, need for further treatment, infection, scar) and benefits of cryotherapy discussed. Patient verbally agreed to proceed with treatment. 2 cryotherapy freeze thaw cycles of 10 seconds were applied to 6 lesions on the hands, forearm, back with cryac. Patient tolerated procedure well. Aftercare instructions given.    3. Seborrheic keratoses  - Benign-appearing nature of lesions discussed. Advised to return to clinic for any new or concerning changes.    4. Lentigines  - Benign-appearing nature of lesions discussed. Advised to return to clinic for any new or concerning changes.      Followup: Return for further care pending results.    Cheyanne Peralta M.D.

## 2019-01-09 LAB — PATHOLOGY CONSULT NOTE: NORMAL

## 2019-01-15 ENCOUNTER — HOSPITAL ENCOUNTER (OUTPATIENT)
Dept: RADIOLOGY | Facility: MEDICAL CENTER | Age: 84
End: 2019-01-15
Attending: PHYSICIAN ASSISTANT
Payer: MEDICARE

## 2019-01-15 DIAGNOSIS — I65.23 ATHEROSCLEROSIS OF BOTH CAROTID ARTERIES: ICD-10-CM

## 2019-01-15 DIAGNOSIS — R47.01 APHASIA: ICD-10-CM

## 2019-01-15 PROCEDURE — 93880 EXTRACRANIAL BILAT STUDY: CPT

## 2019-01-16 ENCOUNTER — TELEPHONE (OUTPATIENT)
Dept: MEDICAL GROUP | Facility: PHYSICIAN GROUP | Age: 84
End: 2019-01-16

## 2019-01-16 DIAGNOSIS — I65.23 CAROTID ARTERY STENOSIS, SYMPTOMATIC, BILATERAL: ICD-10-CM

## 2019-01-16 NOTE — TELEPHONE ENCOUNTER
----- Message from Katrina Mantilla P.A.-C. sent at 1/16/2019  9:34 AM PST -----  Please call patient. I have reviewed patient's carotid ultrasound results.  Right internal carotid artery has 50% to 69% blockage and left internal carotid artery has 70% or greater blockage.  Advised patient she will be referred to a vascular surgeon for further evaluation.  Continue taking aspirin and Lipitor as prescribed.  Follow-up with cardiology on 1/22/2019.    Thank you,    Dorota SIERRA

## 2019-01-16 NOTE — TELEPHONE ENCOUNTER
Phone Number Called: [phone] (home)       Message: LM for patient to call back to go over ultrasound results    Left Message for patient to call back: yes

## 2019-01-16 NOTE — PROGRESS NOTES
Chief Complaint   Patient presents with   • Generalized Body Aches     thinks it may be caused by her medication        HISTORY OF PRESENT ILLNESS: Thalia Horton is an established 87 y.o. female here to discuss the evaluation and management of:    Patient is a pleasant 87-year-old female here today to discuss diffuse joint pain and one episode of slurred speech, speech loss, and difficulty speaking.  She tells me on 12/9/2018 she was walking back from doing her laundry and was accompanied by a friend.  States her friend and her were conversing and at one point she had slurred speech, was unable to formulate words, and had difficulty speaking.  States she was embarrassed and stopped talking.  States she is unsure if her friend noticed and did not ask her about the event.  She tells me that episode lasted 10-15 minutes in duration.  She denies weakness of extremities, facial drooping, arm drooping, dizziness, syncope, unexplained confusion, vision changes, fatigue, lightheadedness, vertigo, headache, difficulty swallowing, inability to understand.  She tells me that she lives alone in independent living facility.  She has a positive medical history for bilateral carotid artery stenosis.  Patient is scheduled to establish with cardiology on 1/22/2019.  I will order a carotid ultrasound for further evaluation.  Patient is taking 81 mg aspirin once daily and atorvastatin 20 mg tab once daily.    Patient is also complaining of neck, shoulders, bilateral elbows, bilateral hands, bilateral knees, bilateral feet pain.  She tells me she has multiple joint pain and has a positive medical history for osteoarthritis.  States her PCP prescribed Mobic 7.5 mg tab once daily and she has been taking this regularly.  Patient is requesting a refill for medication.  She mentions that she also will massage painful joints and she tells me that she participates in exercise classes at the independent living facility.  She describes pain  as a low-grade intermittent aching pain that is exacerbated during colder seasons and more prominent in the morning.        Patient Active Problem List    Diagnosis Date Noted   • History of angina pectoris 2018   • Osteoarthritis of multiple joints 2018   • Acquired hypothyroidism 2018   • Mixed hyperlipidemia 2018   • Atherosclerosis of both carotid arteries 2018   • Gastroesophageal reflux disease without esophagitis 2018   • Bilateral lower extremity edema 2018   • Other emphysema (HCC) 2018       Allergies:Penicillins    Current Outpatient Prescriptions   Medication Sig Dispense Refill   • meloxicam (MOBIC) 7.5 MG Tab TAKE 1 TABLET BY MOUTH EVERY DAY 30 Tab 0   • albuterol 108 (90 Base) MCG/ACT Aero Soln inhalation aerosol Inhale 2 Puffs by mouth every 6 hours as needed for Shortness of Breath. 8.5 g 3   • escitalopram (LEXAPRO) 10 MG Tab Take 1 Tab by mouth every day. 90 Tab 3   • omeprazole (PRILOSEC) 20 MG delayed-release capsule Take 20 mg by mouth every day.  1   • levothyroxine (SYNTHROID) 25 MCG Tab Take 25 mcg by mouth every day.  0   • metoprolol SR (TOPROL XL) 25 MG TABLET SR 24 HR Take 25 mg by mouth every day.  2   • atorvastatin (LIPITOR) 20 MG Tab Take 20 mg by mouth every day.  2   • erythromycin 5 MG/GM Ointment 5 mg.  5   • budesonide-formoterol (SYMBICORT) 160-4.5 MCG/ACT Aerosol Inhale 2 Puffs by mouth 2 Times a Day.       No current facility-administered medications for this visit.        Social History   Substance Use Topics   • Smoking status: Former Smoker     Packs/day: 1.00     Years: 55.00     Types: Cigarettes     Quit date: 2013   • Smokeless tobacco: Never Used   • Alcohol use 6.0 oz/week     10 Glasses of wine per week       Family Status   Relation Status   • Mo    • Son Alive     Family History   Problem Relation Age of Onset   • Stroke Mother 69       ROS:  Review of Systems   Constitutional: Negative for fever,  "chills, weight loss and malaise/fatigue.   HENT: Negative for ear pain, nosebleeds, congestion, sore throat and neck pain.    Eyes: Negative for blurred vision.   Respiratory: Negative for cough, sputum production, shortness of breath and wheezing.    Cardiovascular: Negative for chest pain, palpitations, orthopnea and leg swelling.   Gastrointestinal: Negative for heartburn, nausea, vomiting and abdominal pain.   Genitourinary: Negative for dysuria, urgency and frequency.   Musculoskeletal: + for myalgias, back pain and joint pain.   Skin: Negative for rash and itching.   Neurological: Negative for dizziness, tingling, tremors, sensory change, focal weakness and headaches.   Endo/Heme/Allergies: Does not bruise/bleed easily.   Psychiatric/Behavioral: Negative for depression, suicidal ideas and memory loss.  The patient is not nervous/anxious and does not have insomnia.    All other systems reviewed and are negative except as in HPI.    Exam: Blood pressure 124/76, pulse 83, temperature 36.5 °C (97.7 °F), resp. rate 16, height 1.651 m (5' 5\"), weight 68.9 kg (152 lb), SpO2 96 %, not currently breastfeeding. Body mass index is 25.29 kg/m².  General: Normal appearing. No distress.  HEENT: Normocephalic. Eyes conjunctiva clear lids without ptosis, pupils equal and reactive to light accommodation, ears normal shape and contour, nasal mucosa benign, oropharynx is without erythema, edema or exudates.   Neck: Supple without JVD or bruit. Thyroid is not enlarged.  Pulmonary: Clear to ausculation.  Normal effort. No rales, ronchi, or wheezing.  Cardiovascular: Regular rate and rhythm without murmur.   Abdomen: Soft, nontender, nondistended. Normal bowel sounds. Liver and spleen are not palpable  Neurologic: Grossly nonfocal.  Cranial nerves are normal. .  Lymph: No cervical, supraclavicular or axillary lymph nodes are palpable  Skin: Warm and dry.  No rashes or suspicious skin lesions.  Musculoskeletal: Normal gait. No " extremity cyanosis, clubbing, or edema.  Psych: Normal mood and affect. Alert and oriented x3. Judgment and insight is normal.    Medical decision-making and discussion:    1. Atherosclerosis of both carotid arteries  The symptoms that patient recently experienced presents as a TIA.  A carotid Doppler ultrasound has been ordered for patient to complete.  Patient will be contacted with results.  Advised patient continue taking 81 mg aspirin and atorvastatin 20 mg tab once daily.  Advised patient to follow-up with her cardiology appointment on 1/22/2019.  Discussed ED precautions in great detail with patient.    - US-CAROTID DOPPLER BILAT; Future    2. Aphasia  Same as # 1.    - US-CAROTID DOPPLER BILAT; Future    3. Neck pain  4. Chronic pain of both knees  5. Primary osteoarthritis involving multiple joints  Discussed with patient the risk of using anti-inflammatories chronically.  Discussed that kidney function could decrease with chronic use of anti-inflammatories and it is not recommended.  Advised patient to use over-the-counter Tylenol for arthritic pain symptoms.  Advised patient to not exceed more than 3000 mg 24-hour span.  Advised patient to continue staying active, continue working on diet and stretching.    She has been referred to physical therapy.    - REFERRAL TO PHYSICAL THERAPY Reason for Therapy: Eval/Treat/Report    Please note that this dictation was created using voice recognition software. I have made every reasonable attempt to correct obvious errors, but I expect that there are errors of grammar and possibly content that I did not discover before finalizing the note.      Return if symptoms worsen or fail to improve.

## 2019-01-18 NOTE — TELEPHONE ENCOUNTER
1. Caller Name: Thalia Horton                                           Call Back Number: [phone] (home)       Patient approves a detailed voicemail message: N\A    Patient called and went over results.

## 2019-01-22 ENCOUNTER — OFFICE VISIT (OUTPATIENT)
Dept: CARDIOLOGY | Facility: MEDICAL CENTER | Age: 84
End: 2019-01-22
Payer: MEDICARE

## 2019-01-22 VITALS
HEART RATE: 80 BPM | SYSTOLIC BLOOD PRESSURE: 100 MMHG | BODY MASS INDEX: 25.95 KG/M2 | OXYGEN SATURATION: 95 % | DIASTOLIC BLOOD PRESSURE: 58 MMHG | HEIGHT: 64 IN | WEIGHT: 152 LBS

## 2019-01-22 DIAGNOSIS — R60.9 EDEMA, UNSPECIFIED TYPE: ICD-10-CM

## 2019-01-22 DIAGNOSIS — G45.9 TIA (TRANSIENT ISCHEMIC ATTACK): ICD-10-CM

## 2019-01-22 DIAGNOSIS — I65.23 ATHEROSCLEROSIS OF BOTH CAROTID ARTERIES: ICD-10-CM

## 2019-01-22 DIAGNOSIS — J43.8 OTHER EMPHYSEMA (HCC): ICD-10-CM

## 2019-01-22 DIAGNOSIS — E78.2 MIXED HYPERLIPIDEMIA: ICD-10-CM

## 2019-01-22 LAB — EKG IMPRESSION: NORMAL

## 2019-01-22 PROCEDURE — 99203 OFFICE O/P NEW LOW 30 MIN: CPT | Performed by: INTERNAL MEDICINE

## 2019-01-22 PROCEDURE — 93000 ELECTROCARDIOGRAM COMPLETE: CPT | Performed by: INTERNAL MEDICINE

## 2019-01-22 RX ORDER — ASPIRIN AND DIPYRIDAMOLE 25; 200 MG/1; MG/1
1 CAPSULE, EXTENDED RELEASE ORAL 2 TIMES DAILY
Qty: 60 CAP | Refills: 11 | Status: SHIPPED | OUTPATIENT
Start: 2019-01-22 | End: 2019-01-24 | Stop reason: SDUPTHER

## 2019-01-22 RX ORDER — EZETIMIBE 10 MG/1
10 TABLET ORAL DAILY
Qty: 30 TAB | Refills: 11 | Status: SHIPPED | OUTPATIENT
Start: 2019-01-22 | End: 2019-01-22 | Stop reason: SDUPTHER

## 2019-01-23 NOTE — PROGRESS NOTES
Chief Complaint   Patient presents with   • Carotid Artery Stenosis     NP       Subjective:   Thalia Horton is a 87 y.o. female who presents today for initial evaluation regarding PAD.  Has a history of hypertension hyperlipidemia and bilateral carotid artery stenosis which has progressed to 70% on one side and 50-69% on the other.  She developed TIA symptoms characterized by word finding difficulty and expressive aphasia.  This has resolved.  She was taking aspirin at the time.  She has an upcoming appointment with vascular surgery.  She denies any exertional chest discomfort whatsoever to me today and she has family members in attendance that corroborate this.  She has COPD and has mild dyspnea on exertion related to this.    Past Medical History:   Diagnosis Date   • COPD (chronic obstructive pulmonary disease) (HCC)    • GERD (gastroesophageal reflux disease)    • Heart palpitations    • Hyperlipidemia    • Osteoarthritis    • Thyroid disease      Past Surgical History:   Procedure Laterality Date   • HIP REPLACEMENT, TOTAL Left      Family History   Problem Relation Age of Onset   • Stroke Mother 69     Social History     Social History   • Marital status:      Spouse name: N/A   • Number of children: N/A   • Years of education: N/A     Occupational History   • Not on file.     Social History Main Topics   • Smoking status: Former Smoker     Packs/day: 1.00     Years: 55.00     Types: Cigarettes     Quit date: 7/19/2013   • Smokeless tobacco: Never Used   • Alcohol use 6.0 oz/week     10 Glasses of wine per week   • Drug use: No   • Sexual activity: No     Other Topics Concern   • Not on file     Social History Narrative   • No narrative on file     Allergies   Allergen Reactions   • Penicillins Vomiting     Outpatient Encounter Prescriptions as of 1/22/2019   Medication Sig Dispense Refill   • aspirin-dipyridamole (AGGRENOX)  MG CAPSULE SR 12 HR Take 1 Cap by mouth 2 times a day. 60 Cap 11   •  "ezetimibe (ZETIA) 10 MG Tab Take 1 Tab by mouth every day. 30 Tab 11   • meloxicam (MOBIC) 7.5 MG Tab TAKE 1 TABLET BY MOUTH EVERY DAY 30 Tab 0   • albuterol 108 (90 Base) MCG/ACT Aero Soln inhalation aerosol Inhale 2 Puffs by mouth every 6 hours as needed for Shortness of Breath. 8.5 g 3   • escitalopram (LEXAPRO) 10 MG Tab Take 1 Tab by mouth every day. 90 Tab 3   • omeprazole (PRILOSEC) 20 MG delayed-release capsule Take 20 mg by mouth every day.  1   • levothyroxine (SYNTHROID) 25 MCG Tab Take 25 mcg by mouth every day.  0   • metoprolol SR (TOPROL XL) 25 MG TABLET SR 24 HR Take 25 mg by mouth every day.  2   • atorvastatin (LIPITOR) 20 MG Tab Take 20 mg by mouth every day.  2   • erythromycin 5 MG/GM Ointment 5 mg.  5   • budesonide-formoterol (SYMBICORT) 160-4.5 MCG/ACT Aerosol Inhale 2 Puffs by mouth 2 Times a Day.       No facility-administered encounter medications on file as of 1/22/2019.      Review of Systems   All other systems reviewed and are negative.       Objective:   /58 (BP Location: Left arm, Patient Position: Sitting, BP Cuff Size: Adult)   Pulse 80   Ht 1.626 m (5' 4\")   Wt 68.9 kg (152 lb)   SpO2 95%   BMI 26.09 kg/m²     Physical Exam   Constitutional: She is oriented to person, place, and time. She appears well-developed and well-nourished. No distress.   HENT:   Head: Normocephalic and atraumatic.   Right Ear: External ear normal.   Left Ear: External ear normal.   Mouth/Throat: No oropharyngeal exudate.   Eyes: Pupils are equal, round, and reactive to light. Conjunctivae and EOM are normal. Right eye exhibits no discharge. Left eye exhibits no discharge. No scleral icterus.   Neck: Normal range of motion. Neck supple. No JVD present. No tracheal deviation present. No thyromegaly present.   Cardiovascular: Normal rate, regular rhythm, S1 normal, S2 normal and intact distal pulses.  PMI is not displaced.  Exam reveals no gallop, no S3, no S4 and no friction rub.    Murmur " heard.  Pulses:       Carotid pulses are 2+ on the right side, and 2+ on the left side.       Radial pulses are 2+ on the right side, and 2+ on the left side.        Popliteal pulses are 2+ on the right side, and 2+ on the left side.        Dorsalis pedis pulses are 2+ on the right side, and 2+ on the left side.        Posterior tibial pulses are 2+ on the right side, and 2+ on the left side.   Pulmonary/Chest: Effort normal and breath sounds normal. No respiratory distress. She has no wheezes. She has no rales. She exhibits no tenderness.   Abdominal: Soft. Bowel sounds are normal. She exhibits no distension. There is no tenderness.   Musculoskeletal: Normal range of motion. She exhibits no edema or tenderness.   Neurological: She is alert and oriented to person, place, and time. No cranial nerve deficit (Cranial nerves II through XII grossly intact).   Skin: Skin is warm and dry. No rash noted. She is not diaphoretic. No erythema.   Psychiatric: She has a normal mood and affect. Her behavior is normal. Judgment and thought content normal.   Vitals reviewed.    LABS:  Lab Results   Component Value Date/Time    CHOLSTRLTOT 201 (H) 09/06/2018 06:51 PM     (H) 09/06/2018 06:51 PM    HDL 55 09/06/2018 06:51 PM    TRIGLYCERIDE 199 (H) 09/06/2018 06:51 PM       No results found for: WBC, RBC, HEMOGLOBIN, HEMATOCRIT, MCV, NEUTSPOLYS, LYMPHOCYTES, MONOCYTES, EOSINOPHILS, BASOPHILS, HYPOCHROMIA, ANISOCYTOSIS  Lab Results   Component Value Date/Time    SODIUM 140 09/06/2018 06:51 PM    POTASSIUM 4.5 09/06/2018 06:51 PM    CHLORIDE 105 09/06/2018 06:51 PM    CO2 28 09/06/2018 06:51 PM    GLUCOSE 134 (H) 09/06/2018 06:51 PM    BUN 14 09/06/2018 06:51 PM    CREATININE 0.71 09/06/2018 06:51 PM     Lab Results   Component Value Date    HBA1C 6.0 (H) 09/06/2018      Lab Results   Component Value Date/Time    ALKPHOSPHAT 51 09/06/2018 06:51 PM    ASTSGOT 22 09/06/2018 06:51 PM    ALTSGPT 20 09/06/2018 06:51 PM    TBILIRUBIN  0.8 09/06/2018 06:51 PM      No results found for: BNPBTYPENAT   No results found for: TSH  No results found for: PROTHROMBTM, INR     EKG (1/22/2019):  I have personally reviewed the EKG this visit and discussed with the patient.  Sinus rhythm, left axis deviation, poor R wave progression.      Assessment:     1. Atherosclerosis of both carotid arteries  EKG    aspirin-dipyridamole (AGGRENOX)  MG CAPSULE SR 12 HR    ezetimibe (ZETIA) 10 MG Tab   2. Edema, unspecified type  EKG   3. Mixed hyperlipidemia  EKG    aspirin-dipyridamole (AGGRENOX)  MG CAPSULE SR 12 HR    ezetimibe (ZETIA) 10 MG Tab   4. TIA (transient ischemic attack)     5. Other emphysema (HCC)         Medical Decision Making:  Today's Assessment / Status / Plan:     She has significant peripheral arterial disease and I recommend increase of her statin.  She indicates her current dose is already making her sore.  Therefore recommend addition of Zetia.  Goal LDL is less than 70.  Recommended advancing her antiplatelet therapy as she had a TIA while on aspirin to either aspirin or Plavix or Aggrenox.  They have chosen Aggrenox and I have prescribed this.  She does not be affordable than aspirin and Plavix is a very reasonable alternative.  Recommend following up closely with vascular surgery.

## 2019-01-24 DIAGNOSIS — E78.2 MIXED HYPERLIPIDEMIA: ICD-10-CM

## 2019-01-24 DIAGNOSIS — I65.23 ATHEROSCLEROSIS OF BOTH CAROTID ARTERIES: ICD-10-CM

## 2019-01-24 RX ORDER — EZETIMIBE 10 MG/1
TABLET ORAL
Qty: 90 TAB | Refills: 3 | Status: SHIPPED | OUTPATIENT
Start: 2019-01-24 | End: 2019-05-28 | Stop reason: SDUPTHER

## 2019-01-25 RX ORDER — ASPIRIN AND DIPYRIDAMOLE 25; 200 MG/1; MG/1
CAPSULE, EXTENDED RELEASE ORAL
Qty: 180 CAP | Refills: 3 | Status: SHIPPED | OUTPATIENT
Start: 2019-01-25 | End: 2019-05-28

## 2019-01-28 RX ORDER — MELOXICAM 7.5 MG/1
TABLET ORAL
Qty: 30 TAB | Refills: 0 | Status: SHIPPED | OUTPATIENT
Start: 2019-01-28 | End: 2019-05-28

## 2019-02-19 ENCOUNTER — OFFICE VISIT (OUTPATIENT)
Dept: MEDICAL GROUP | Facility: PHYSICIAN GROUP | Age: 84
End: 2019-02-19
Payer: MEDICARE

## 2019-02-19 ENCOUNTER — HOSPITAL ENCOUNTER (OUTPATIENT)
Dept: LAB | Facility: MEDICAL CENTER | Age: 84
End: 2019-02-19
Attending: NURSE PRACTITIONER
Payer: MEDICARE

## 2019-02-19 VITALS
TEMPERATURE: 97.6 F | WEIGHT: 151 LBS | HEART RATE: 78 BPM | BODY MASS INDEX: 25.78 KG/M2 | SYSTOLIC BLOOD PRESSURE: 118 MMHG | HEIGHT: 64 IN | RESPIRATION RATE: 16 BRPM | OXYGEN SATURATION: 95 % | DIASTOLIC BLOOD PRESSURE: 68 MMHG

## 2019-02-19 DIAGNOSIS — R41.3 MEMORY CHANGES: ICD-10-CM

## 2019-02-19 DIAGNOSIS — G45.9 TIA (TRANSIENT ISCHEMIC ATTACK): ICD-10-CM

## 2019-02-19 DIAGNOSIS — I65.23 ATHEROSCLEROSIS OF BOTH CAROTID ARTERIES: ICD-10-CM

## 2019-02-19 DIAGNOSIS — E03.9 ACQUIRED HYPOTHYROIDISM: ICD-10-CM

## 2019-02-19 LAB
ALBUMIN SERPL BCP-MCNC: 4.2 G/DL (ref 3.2–4.9)
ALBUMIN/GLOB SERPL: 1.4 G/DL
ALP SERPL-CCNC: 45 U/L (ref 30–99)
ALT SERPL-CCNC: 15 U/L (ref 2–50)
ANION GAP SERPL CALC-SCNC: 8 MMOL/L (ref 0–11.9)
AST SERPL-CCNC: 20 U/L (ref 12–45)
BASOPHILS # BLD AUTO: 0.8 % (ref 0–1.8)
BASOPHILS # BLD: 0.05 K/UL (ref 0–0.12)
BILIRUB SERPL-MCNC: 0.5 MG/DL (ref 0.1–1.5)
BUN SERPL-MCNC: 17 MG/DL (ref 8–22)
CALCIUM SERPL-MCNC: 9.8 MG/DL (ref 8.5–10.5)
CHLORIDE SERPL-SCNC: 105 MMOL/L (ref 96–112)
CO2 SERPL-SCNC: 29 MMOL/L (ref 20–33)
CREAT SERPL-MCNC: 0.68 MG/DL (ref 0.5–1.4)
EOSINOPHIL # BLD AUTO: 0.09 K/UL (ref 0–0.51)
EOSINOPHIL NFR BLD: 1.4 % (ref 0–6.9)
ERYTHROCYTE [DISTWIDTH] IN BLOOD BY AUTOMATED COUNT: 53.6 FL (ref 35.9–50)
FASTING STATUS PATIENT QL REPORTED: NORMAL
GLOBULIN SER CALC-MCNC: 2.9 G/DL (ref 1.9–3.5)
GLUCOSE SERPL-MCNC: 74 MG/DL (ref 65–99)
HCT VFR BLD AUTO: 45.4 % (ref 37–47)
HGB BLD-MCNC: 14.3 G/DL (ref 12–16)
IMM GRANULOCYTES # BLD AUTO: 0.02 K/UL (ref 0–0.11)
IMM GRANULOCYTES NFR BLD AUTO: 0.3 % (ref 0–0.9)
LYMPHOCYTES # BLD AUTO: 1.87 K/UL (ref 1–4.8)
LYMPHOCYTES NFR BLD: 29.3 % (ref 22–41)
MCH RBC QN AUTO: 33.3 PG (ref 27–33)
MCHC RBC AUTO-ENTMCNC: 31.5 G/DL (ref 33.6–35)
MCV RBC AUTO: 105.8 FL (ref 81.4–97.8)
MONOCYTES # BLD AUTO: 0.77 K/UL (ref 0–0.85)
MONOCYTES NFR BLD AUTO: 12.1 % (ref 0–13.4)
NEUTROPHILS # BLD AUTO: 3.58 K/UL (ref 2–7.15)
NEUTROPHILS NFR BLD: 56.1 % (ref 44–72)
NRBC # BLD AUTO: 0 K/UL
NRBC BLD-RTO: 0 /100 WBC
PLATELET # BLD AUTO: 212 K/UL (ref 164–446)
PMV BLD AUTO: 9.8 FL (ref 9–12.9)
POTASSIUM SERPL-SCNC: 4.4 MMOL/L (ref 3.6–5.5)
PROT SERPL-MCNC: 7.1 G/DL (ref 6–8.2)
RBC # BLD AUTO: 4.29 M/UL (ref 4.2–5.4)
SODIUM SERPL-SCNC: 142 MMOL/L (ref 135–145)
TSH SERPL DL<=0.005 MIU/L-ACNC: 1.58 UIU/ML (ref 0.38–5.33)
VIT B12 SERPL-MCNC: 487 PG/ML (ref 211–911)
WBC # BLD AUTO: 6.4 K/UL (ref 4.8–10.8)

## 2019-02-19 PROCEDURE — 85025 COMPLETE CBC W/AUTO DIFF WBC: CPT

## 2019-02-19 PROCEDURE — 99214 OFFICE O/P EST MOD 30 MIN: CPT | Performed by: NURSE PRACTITIONER

## 2019-02-19 PROCEDURE — 84443 ASSAY THYROID STIM HORMONE: CPT

## 2019-02-19 PROCEDURE — 80053 COMPREHEN METABOLIC PANEL: CPT

## 2019-02-19 PROCEDURE — 36415 COLL VENOUS BLD VENIPUNCTURE: CPT

## 2019-02-19 PROCEDURE — 82607 VITAMIN B-12: CPT

## 2019-02-19 RX ORDER — LEVOTHYROXINE SODIUM 0.03 MG/1
25 TABLET ORAL DAILY
COMMUNITY
Start: 2019-01-29 | End: 2019-05-28 | Stop reason: SDUPTHER

## 2019-02-19 NOTE — PROGRESS NOTES
"Chief Complaint   Patient presents with   • Memory Loss     x 6 months    • Depression     getting worse; x 1 year        HISTORY OF THE PRESENT ILLNESS: This is a 88 y.o. female established patient who presents today for follow up.    At last visit with Katrina Mantilla (MARIELA), patient reported having TIA symptoms on 12/9/18, in which she was having word finding difficulty and expressive aphasia. She has history of bilateral carotid artery stenosis. Patient has followed with cardiology who put her on Aggrenox and added Zetia for cholesterol. She was referred to vascular surgery but has not yet followed up with them. She states she has an appointment with vascular surgery on 3/5/19. No reports of active chest pain, palpitations, or shortness of breath.    On exam today, patient states she is having worsening memory loss issues over the last ~ 6 months. States she is unable to remember people's names including people's names from dinner the night before. She states she is forgetting \"a little of bit of everything\" including certain words. She has been misplacing objects. She is forgetting the names of songs and actors' names. She has been fine with paying her own bills. She denies any issues with late payments. She would like to know how fast the memory loss will progress. Patient states she has been feeling generally tired and sluggish. States she has stopped taking her synthroid for the last 4 days to see how she feels. No reports of recent fevers or chills.    Patient has a history of depression which is chronic in nature. She has depression in the mornings which resolves after she takes her medications. States she is not worried about her depression. Patient states she has had thoughts about SI and has wondered how other people \"do it.\" She has never had a plan and notes she does not have any guns or pills to attempt to harm herself. Patient states she is safe and does want to live.       Past Medical History: "   Diagnosis Date   • COPD (chronic obstructive pulmonary disease) (HCC)    • GERD (gastroesophageal reflux disease)    • Heart palpitations    • Hyperlipidemia    • Osteoarthritis    • Thyroid disease        Past Surgical History:   Procedure Laterality Date   • HIP REPLACEMENT, TOTAL Left        Family Status   Relation Status   • Mo    • Son Alive     Family History   Problem Relation Age of Onset   • Stroke Mother 69       Social History   Substance Use Topics   • Smoking status: Former Smoker     Packs/day: 1.00     Years: 55.00     Types: Cigarettes     Quit date: 2013   • Smokeless tobacco: Never Used   • Alcohol use 6.0 oz/week     10 Glasses of wine per week       Allergies: Penicillins    Current Outpatient Prescriptions Ordered in [facility]   Medication Sig Dispense Refill   • meloxicam (MOBIC) 7.5 MG Tab TAKE 1 TABLET BY MOUTH EVERY DAY 30 Tab 0   • aspirin-dipyridamole (AGGRENOX)  MG CAPSULE SR 12 HR TAKE 1 CAPSULE BY MOUTH TWICE DAILY 180 Cap 3   • ezetimibe (ZETIA) 10 MG Tab TAKE 1 TABLET BY MOUTH EVERY DAY 90 Tab 3   • albuterol 108 (90 Base) MCG/ACT Aero Soln inhalation aerosol Inhale 2 Puffs by mouth every 6 hours as needed for Shortness of Breath. 8.5 g 3   • escitalopram (LEXAPRO) 10 MG Tab Take 1 Tab by mouth every day. 90 Tab 3   • levothyroxine (SYNTHROID) 25 MCG Tab Take 25 mcg by mouth every day.     • omeprazole (PRILOSEC) 20 MG delayed-release capsule Take 20 mg by mouth every day.  1   • levothyroxine (SYNTHROID) 25 MCG Tab Take 25 mcg by mouth every day.  0   • metoprolol SR (TOPROL XL) 25 MG TABLET SR 24 HR Take 25 mg by mouth every day.  2   • atorvastatin (LIPITOR) 20 MG Tab Take 20 mg by mouth every day.  2   • erythromycin 5 MG/GM Ointment 5 mg.  5   • budesonide-formoterol (SYMBICORT) 160-4.5 MCG/ACT Aerosol Inhale 2 Puffs by mouth 2 Times a Day.       No current [facility]-ordered facility-administered medications on file.        Review of Systems   Constitutional:  "Malaise/fatigue Negative for fever, chills, weight loss.   Respiratory: Negative for cough, sputum production, shortness of breath and wheezing.    Cardiovascular: Negative for chest pain, palpitations, orthopnea and leg swelling.   Neurological: Memory loss, forgetfulness. Negative for dizziness, tingling, tremors, sensory change, and headaches.   Psychiatric/Behavioral: Depression (chronic). Negative for anxiety or memory loss.     All other systems reviewed and are negative except as in HPI.    Exam: Blood pressure 118/68, pulse 78, temperature 36.4 °C (97.6 °F), temperature source Temporal, resp. rate 16, height 1.626 m (5' 4\"), weight 68.5 kg (151 lb), SpO2 95 %, not currently breastfeeding.  General:  Normal appearing. No distress.  HEENT: Normocephalic. Eyes conjunctiva clear lids without ptosis, pupils equal and reactive to light accommodation, nasal mucosa benign.  Neck: Supple without JVD. Positive bruit bilaterally. Thyroid is not enlarged.  Pulmonary:  Clear to ausculation.  Normal effort. No rales, ronchi, or wheezing.  Cardiovascular:  Regular rate and rhythm without murmur. Carotid and radial pulses are intact and equal bilaterally.  Neurologic:  Grossly nonfocal. CN 2-12 intact.   Skin:  Warm and dry.  No obvious lesions.  Musculoskeletal:  Normal gait. No extremity cyanosis, clubbing, or edema.  Psych:  Normal mood and affect. Alert and oriented x3. Judgment and insight is normal.      PLAN:    1. Memory changes  - Will send patient to [facility] for further evaluation  -Patient has history of significant vitamin B12 deficiency patient has been taking oral supplements intermittently plan to check levels.  -Patient is hesitant to complete Mini-Mental Status Examination today will have patient come back for this.  - Patient was wondering if she could take Prevagen. Informed her we will need to consult with cardiology/vascular first  -concern for vascular dementia vs other cause  - VITAMIN B12; " Future  - CBC WITH DIFFERENTIAL; Future  - Comp Metabolic Panel; Future    2. Acquired hypothyroidism  - Patient notes she stopped taking her Synthroid for the past 4 days. Informed her this is possibly causing her fatigue. Advised her to go back on her Synthroid. Ordering labs. Will then see if she needs B12 shots if the fatigue persists.  - Ordering labs  - TSH WITH REFLEX TO FT4; Future    3. Atherosclerosis of both carotid arteries  - Patient will continue follow up with cardiology and vascular surgery. Patient states she has an appointment with them on 3/5/19.    4. TIA (transient ischemic attack)  - See 3 above. Continue current plan of care and follow-ups.    Follow-up in 1 month or depending on lab results patient is encouraged to be seen in the emergency room for chest pain, palpitations, shortness of breath, dizziness, severe abdominal pain or other concerning symptoms.      Please note that this dictation was created using voice recognition software. I have made every reasonable attempt to correct obvious errors, but I expect that there are errors of grammar and possibly content that I did not discover before finalizing the note.      Assessment/Plan  1. Memory changes  VITAMIN B12    CBC WITH DIFFERENTIAL    Comp Metabolic Panel   2. Acquired hypothyroidism  TSH WITH REFLEX TO FT4   3. Atherosclerosis of both carotid arteries     4. TIA (transient ischemic attack)           I have placed the below orders and discussed them with an approved delegating provider. The MA is performing the below orders under the direction of Dr. Ramey.       Den ROLDAN (Scribe), am scribing for, and in the presence of, DEJA Govea    Electronically signed by: Den Vazquez (Sophia), 2/19/2019    Marcelo ROLDAN APRN personally performed the services described in this documentation, as scribed by Den Vazquez in my presence, and it is both accurate and complete.

## 2019-02-21 ENCOUNTER — TELEPHONE (OUTPATIENT)
Dept: MEDICAL GROUP | Facility: PHYSICIAN GROUP | Age: 84
End: 2019-02-21

## 2019-02-21 DIAGNOSIS — R79.89 ABNORMAL CBC: ICD-10-CM

## 2019-02-22 NOTE — TELEPHONE ENCOUNTER
VOICEMAIL  1. Caller Name: Thalia Horton                        Call Back Number: [phone] (home)       2. Message: Called and left patient a message to call back to get lab results. Lm     3. Patient approves office to leave a detailed voicemail/MyChart message: N\A

## 2019-02-22 NOTE — TELEPHONE ENCOUNTER
VOICEMAIL  1. Caller Name: Thalia Horton                        Call Back Number: [phone] (home)         2. Message: Called and left patient a message to call back to get lab results.      3. Patient approves office to leave a detailed voicemail/MyChart message: N\A      2nd attempt

## 2019-02-22 NOTE — TELEPHONE ENCOUNTER
----- Message from DOE Paiz sent at 2/21/2019  3:17 PM PST -----  Please call pt and give lab results: Overall labs are within normal limits.  Do have some changes on your CBC I would like to have you repeat CBC in the next month so that we can follow-up on these changes.  I will also order folate to be completed at that time your B12 is within normal limits on your supplement.

## 2019-03-02 NOTE — TELEPHONE ENCOUNTER
Was the patient seen in the last year in this department? Yes    Does patient have an active prescription for medications requested? No     Received Request Via: Patient - Son Ric  [phone]

## 2019-03-04 RX ORDER — LEVOTHYROXINE SODIUM 0.03 MG/1
25 TABLET ORAL DAILY
Qty: 90 TAB | Refills: 3 | Status: SHIPPED | OUTPATIENT
Start: 2019-03-04 | End: 2019-05-28

## 2019-03-04 RX ORDER — OMEPRAZOLE 20 MG/1
20 CAPSULE, DELAYED RELEASE ORAL DAILY
Qty: 90 CAP | Refills: 3 | Status: SHIPPED | OUTPATIENT
Start: 2019-03-04 | End: 2019-05-28

## 2019-03-25 ENCOUNTER — APPOINTMENT (OUTPATIENT)
Dept: RADIOLOGY | Facility: MEDICAL CENTER | Age: 84
End: 2019-03-25
Attending: EMERGENCY MEDICINE
Payer: MEDICARE

## 2019-03-25 ENCOUNTER — HOSPITAL ENCOUNTER (EMERGENCY)
Facility: MEDICAL CENTER | Age: 84
End: 2019-03-25
Attending: EMERGENCY MEDICINE
Payer: MEDICARE

## 2019-03-25 VITALS
DIASTOLIC BLOOD PRESSURE: 93 MMHG | TEMPERATURE: 96.9 F | BODY MASS INDEX: 26.38 KG/M2 | RESPIRATION RATE: 16 BRPM | WEIGHT: 154.54 LBS | HEART RATE: 77 BPM | HEIGHT: 64 IN | OXYGEN SATURATION: 93 % | SYSTOLIC BLOOD PRESSURE: 156 MMHG

## 2019-03-25 DIAGNOSIS — S80.11XA CONTUSION OF MULTIPLE SITES OF RIGHT LOWER EXTREMITY, INITIAL ENCOUNTER: ICD-10-CM

## 2019-03-25 PROCEDURE — 73590 X-RAY EXAM OF LOWER LEG: CPT | Mod: RT

## 2019-03-25 PROCEDURE — 99284 EMERGENCY DEPT VISIT MOD MDM: CPT

## 2019-03-25 PROCEDURE — 73140 X-RAY EXAM OF FINGER(S): CPT | Mod: LT

## 2019-03-25 ASSESSMENT — LIFESTYLE VARIABLES: DO YOU DRINK ALCOHOL: NO

## 2019-03-25 NOTE — ED TRIAGE NOTES
Pt ambs to triage  Chief Complaint   Patient presents with   • T-5000 FALL     Hematoma to right shin.     Pt also says she bumped her head.  Denies any blood thinners besides Asprin.

## 2019-03-26 NOTE — ED PROVIDER NOTES
ED Provider Note    CHIEF COMPLAINT  Chief Complaint   Patient presents with   • T-5000 FALL     Hematoma to right shin.         HPI  Thalia Horton is a 88 y.o. female who presents with right leg pain.  The patient had a mechanical fall prior to arrival.  She states that she was hurrying to get the phone when she tripped.  She states that she struck her right leg on the wall and also somehow injured her left finger.  She states she might of hit her head but she does not have a headache.  She did not have any loss of consciousness.  She does not have any visual changes nor does she have any nausea or vomiting.  The patient presents with a large hematoma to the midshaft of the right tibia.  She also some ecchymosis surrounding the left finger.  She does not have any loss of function to her extremities.  She denies striking her chest and abdomen.  She does not have any acute neck or back pain.  She states she does have chronic neck pain that is unchanged.    REVIEW OF SYSTEMS  See HPI for further details. All other systems are negative.     PAST MEDICAL HISTORY  Past Medical History:   Diagnosis Date   • COPD (chronic obstructive pulmonary disease) (HCC)    • GERD (gastroesophageal reflux disease)    • Heart palpitations    • Hyperlipidemia    • Osteoarthritis    • Thyroid disease        FAMILY HISTORY  [unfilled]    SOCIAL HISTORY  Social History     Social History   • Marital status:      Spouse name: N/A   • Number of children: N/A   • Years of education: N/A     Social History Main Topics   • Smoking status: Former Smoker     Packs/day: 1.00     Years: 55.00     Types: Cigarettes     Quit date: 7/19/2013   • Smokeless tobacco: Never Used   • Alcohol use 6.0 oz/week     10 Glasses of wine per week   • Drug use: No   • Sexual activity: No     Other Topics Concern   • Not on file     Social History Narrative   • No narrative on file       SURGICAL HISTORY  Past Surgical History:   Procedure Laterality  "Date   • HIP REPLACEMENT, TOTAL Left        CURRENT MEDICATIONS  Home Medications     Reviewed by Margo Stark R.N. (Registered Nurse) on 03/25/19 at 1637  Med List Status: <None>   Medication Last Dose Status   albuterol 108 (90 Base) MCG/ACT Aero Soln inhalation aerosol  Active   aspirin-dipyridamole (AGGRENOX)  MG CAPSULE SR 12 HR  Active   atorvastatin (LIPITOR) 20 MG Tab  Active   budesonide-formoterol (SYMBICORT) 160-4.5 MCG/ACT Aerosol  Active   erythromycin 5 MG/GM Ointment  Active   escitalopram (LEXAPRO) 10 MG Tab  Active   ezetimibe (ZETIA) 10 MG Tab  Active   levothyroxine (SYNTHROID) 25 MCG Tab  Active   levothyroxine (SYNTHROID) 25 MCG Tab  Active   meloxicam (MOBIC) 7.5 MG Tab  Active   metoprolol SR (TOPROL XL) 25 MG TABLET SR 24 HR  Active   omeprazole (PRILOSEC) 20 MG delayed-release capsule  Active                ALLERGIES  Allergies   Allergen Reactions   • Penicillins Vomiting       PHYSICAL EXAM  VITAL SIGNS: /87   Pulse 86   Temp 36.1 °C (96.9 °F) (Temporal)   Resp 16   Ht 1.626 m (5' 4\")   Wt 70.1 kg (154 lb 8.7 oz)   SpO2 93%   BMI 26.53 kg/m²       Constitutional: Well developed, Well nourished, No acute distress, Non-toxic appearance.   HENT: Normocephalic, Atraumatic, Bilateral external ears normal, Oropharynx moist, No oral exudates, Nose normal.   Eyes: PERRLA, EOMI, Conjunctiva normal, No discharge.   Neck: Normal range of motion, No tenderness, Supple, No stridor.   Lymphatic: No lymphadenopathy noted.   Cardiovascular: Normal heart rate, Normal rhythm, No murmurs, No rubs, No gallops.   Thorax & Lungs: Normal breath sounds, No respiratory distress, No wheezing, No chest tenderness.   Abdomen: Bowel sounds normal, Soft, No tenderness, No masses, No pulsatile masses.   Skin: Ecchymosis surrounding the left fifth proximal and mid phalanx, large area of ecchymosis and hematoma to the right anterior tibia.   Back: No tenderness, No CVA tenderness.   Extremities: " Tenderness with hematoma formation to the midshaft of the right tibia anteriorly, the patient also some pain to the left fifth proximal and mid phalanx to palpation without obvious deformities.  Extremities otherwise intact distal pulses, No edema, No tenderness, No cyanosis, No clubbing.   Neurologic: GCS of 15  Psychiatric: Affect normal, Judgment normal, Mood normal.     RADIOLOGY/PROCEDURES  DX-TIBIA AND FIBULA RIGHT   Final Result      Soft tissue swelling anterior to the tibia. No acute fracture or dislocation.      DX-FINGER(S) 2+ LEFT   Final Result      1.  Probable fracture of the 5th middle phalanx identified only on the oblique view      2.  Multiple sites of osteoarthritis of the left hand and left wrist          COURSE & MEDICAL DECISION MAKING  Pertinent Labs & Imaging studies reviewed. (See chart for details)  This an 88-year-old female who presents the emergency department after a fall.  The patient has a large hematoma to the anterior right leg which I suspect is just from a contusion.  The x-ray does not support a fracture.  She does appear to have a small fracture through the left fifth mid phalanx and will place the patient in aluminum splint.  The patient will follow up with her primary care doctor in 5-7 days.  She will utilize ice and Motrin for pain control.  At the time of discharge I do not see any other evidence of injury.  She continues to be neurologically intact with no significant evidence of a head injury.    FINAL IMPRESSION  1.  Right leg contusion  2.  Left fifth phalanx fracture      Disposition  The patient will be discharged in stable condition         Electronically signed by: Wyatt Thapa, 3/25/2019 5:27 PM

## 2019-03-26 NOTE — ED NOTES
Patient discharge instruction provided. Patient denies any questions at this time. Ambulated to lobby with daughter

## 2019-04-02 ENCOUNTER — OFFICE VISIT (OUTPATIENT)
Dept: MEDICAL GROUP | Facility: PHYSICIAN GROUP | Age: 84
End: 2019-04-02
Payer: MEDICARE

## 2019-04-02 ENCOUNTER — HOSPITAL ENCOUNTER (OUTPATIENT)
Dept: RADIOLOGY | Facility: MEDICAL CENTER | Age: 84
End: 2019-04-02
Attending: NURSE PRACTITIONER
Payer: MEDICARE

## 2019-04-02 ENCOUNTER — TELEPHONE (OUTPATIENT)
Dept: MEDICAL GROUP | Facility: PHYSICIAN GROUP | Age: 84
End: 2019-04-02

## 2019-04-02 VITALS
HEART RATE: 84 BPM | RESPIRATION RATE: 16 BRPM | TEMPERATURE: 97.9 F | BODY MASS INDEX: 25.95 KG/M2 | DIASTOLIC BLOOD PRESSURE: 64 MMHG | HEIGHT: 64 IN | WEIGHT: 152 LBS | SYSTOLIC BLOOD PRESSURE: 120 MMHG | OXYGEN SATURATION: 94 %

## 2019-04-02 DIAGNOSIS — M15.9 PRIMARY OSTEOARTHRITIS INVOLVING MULTIPLE JOINTS: ICD-10-CM

## 2019-04-02 DIAGNOSIS — K21.9 GASTROESOPHAGEAL REFLUX DISEASE WITHOUT ESOPHAGITIS: ICD-10-CM

## 2019-04-02 DIAGNOSIS — G31.84 MILD COGNITIVE IMPAIRMENT: ICD-10-CM

## 2019-04-02 DIAGNOSIS — S99.911D INJURY OF RIGHT ANKLE, SUBSEQUENT ENCOUNTER: ICD-10-CM

## 2019-04-02 DIAGNOSIS — I10 ESSENTIAL HYPERTENSION: ICD-10-CM

## 2019-04-02 PROCEDURE — 99214 OFFICE O/P EST MOD 30 MIN: CPT | Performed by: NURSE PRACTITIONER

## 2019-04-02 PROCEDURE — 73610 X-RAY EXAM OF ANKLE: CPT | Mod: RT

## 2019-04-02 RX ORDER — LEVOTHYROXINE SODIUM 0.03 MG/1
25 TABLET ORAL DAILY
COMMUNITY
Start: 2019-03-04 | End: 2019-05-28

## 2019-04-02 RX ORDER — METOPROLOL SUCCINATE 25 MG/1
12.5 TABLET, EXTENDED RELEASE ORAL DAILY
Qty: 90 TAB | Refills: 3 | Status: SHIPPED | OUTPATIENT
Start: 2019-04-02 | End: 2019-05-28 | Stop reason: SDUPTHER

## 2019-04-02 RX ORDER — FAMOTIDINE 20 MG/1
20 TABLET, FILM COATED ORAL 2 TIMES DAILY
Qty: 60 TAB | Refills: 0 | Status: SHIPPED | OUTPATIENT
Start: 2019-04-02 | End: 2019-04-02 | Stop reason: SDUPTHER

## 2019-04-02 RX ORDER — FAMOTIDINE 20 MG/1
TABLET, FILM COATED ORAL
Qty: 180 TAB | Refills: 0 | Status: SHIPPED | OUTPATIENT
Start: 2019-04-02 | End: 2019-05-28

## 2019-04-02 RX ORDER — LEVOTHYROXINE SODIUM 0.03 MG/1
TABLET ORAL
Refills: 3 | COMMUNITY
Start: 2019-03-04 | End: 2019-05-28

## 2019-04-02 NOTE — TELEPHONE ENCOUNTER
----- Message from DOE Paiz sent at 4/2/2019  3:49 PM PDT -----  Please call pt and give lab results:  There is no evidence of fracture on x-ray of ankle there is significant bone spurring on osteoarthritis.

## 2019-04-02 NOTE — PROGRESS NOTES
Chief Complaint   Patient presents with   • Hospital Follow-up     R leg pain      Patient was seen and assessed by Marcelo Isaacs and note was misattributed due to technical error.     HISTORY OF THE PRESENT ILLNESS: This is a 88 y.o. female established patient who presents today for hospital follow up.    Patient was in the ED on 3/25/19 after a right leg injury. Per chart review, patient had a mechanical fall that day, in which she struck her right leg on a wall and also injured her left hand pinky finger. In the ED, her xray of her right tibia and fibula was negative for fracture. She was found to have a small fracture through her left fifth mid phalanx and was placed in an aluminum splint. Patient did not get ankle xray done in the ED. Family states patient was having associated increased right foot pain 2-3 days later which caused her to have trouble bearing weight on the foot for a couple of days. Patient has been icing the area. She states her pain has improved overall since then. Her ability to bear weight on the foot has improved as well. She is noted to have increased purple coloration of her right foot. Patient mentions she has an appointment with vascular surgery later today.    Patient has a history of GERD which is chronic in nature. Patient is currently taking omeprazole daily. States this medication works well for her. She has chronic history of low vitamin B12. She had a [facility] assessment recently and was recommended to switch acid reducers to one that does not affect vitamin absorption.     At recent [facility] appointment there, patient was advised to stop drinking. She notes drinking wine regularly in the last several years. She states she enjoys drinking. States she decreased her alcohol consumption in the last week due to running low on her wine. States she had 2-3 days of no drinking.   With regard to patient's memory they also recommended that she get exercise and  continue to eat a healthy diet.    Patient has a history of hypertension which is chronic in nature. She is taking metoprolol for this. No reports of medication side effects. She is request refill for this medication. No reports of chest pain, palpitations, shortness of breath, dizziness, or headaches.      Past Medical History:   Diagnosis Date   • COPD (chronic obstructive pulmonary disease) (HCC)    • GERD (gastroesophageal reflux disease)    • Heart palpitations    • Hyperlipidemia    • Osteoarthritis    • Thyroid disease        Past Surgical History:   Procedure Laterality Date   • HIP REPLACEMENT, TOTAL Left        Family Status   Relation Status   • Mo    • Son Alive     Family History   Problem Relation Age of Onset   • Stroke Mother 69       Social History   Substance Use Topics   • Smoking status: Former Smoker     Packs/day: 1.00     Years: 55.00     Types: Cigarettes     Quit date: 2013   • Smokeless tobacco: Never Used   • Alcohol use 6.0 oz/week     10 Glasses of wine per week       Allergies: Penicillins    Current Outpatient Prescriptions Ordered in [facility]   Medication Sig Dispense Refill   • famotidine (PEPCID) 20 MG Tab Take 1 Tab by mouth 2 times a day. 60 Tab 0   • metoprolol SR (TOPROL XL) 25 MG TABLET SR 24 HR Take 0.5 Tabs by mouth every day. 90 Tab 3   • levothyroxine (SYNTHROID) 25 MCG Tab Take 1 Tab by mouth every day. 90 Tab 3   • omeprazole (PRILOSEC) 20 MG delayed-release capsule Take 1 Cap by mouth every day. 90 Cap 3   • aspirin-dipyridamole (AGGRENOX)  MG CAPSULE SR 12 HR TAKE 1 CAPSULE BY MOUTH TWICE DAILY 180 Cap 3   • ezetimibe (ZETIA) 10 MG Tab TAKE 1 TABLET BY MOUTH EVERY DAY 90 Tab 3   • albuterol 108 (90 Base) MCG/ACT Aero Soln inhalation aerosol Inhale 2 Puffs by mouth every 6 hours as needed for Shortness of Breath. 8.5 g 3   • escitalopram (LEXAPRO) 10 MG Tab Take 1 Tab by mouth every day. 90 Tab 3   • atorvastatin (LIPITOR) 20 MG Tab Take 20 mg by mouth  "every day.  2   • erythromycin 5 MG/GM Ointment 5 mg.  5   • budesonide-formoterol (SYMBICORT) 160-4.5 MCG/ACT Aerosol Inhale 2 Puffs by mouth 2 Times a Day.     • levothyroxine (SYNTHROID) 25 MCG Tab Take 25 mcg by mouth every day.     • levothyroxine (SYNTHROID) 25 MCG Tab TK 1 T PO QD  3   • levothyroxine (SYNTHROID) 25 MCG Tab Take 25 mcg by mouth every day.     • meloxicam (MOBIC) 7.5 MG Tab TAKE 1 TABLET BY MOUTH EVERY DAY (Patient not taking: Reported on 4/2/2019) 30 Tab 0     No current Epic-ordered facility-administered medications on file.        Review of Systems   Constitutional: Negative for fever, chills, weight loss and malaise/fatigue.   Respiratory: Negative for cough, sputum production, shortness of breath and wheezing.    Cardiovascular: Negative for chest pain, palpitations, orthopnea and leg swelling.   Musculoskeletal: Right leg pain, right foot pain, left hand pinky pain. Negative for back pain  Skin: Right foot purple discoloration. Negative for rash and itching.   Neurological: Negative for dizziness, tingling, tremors, sensory change, focal weakness and headaches.   All other systems reviewed and are negative except as in HPI.    Exam: Blood pressure 120/64, pulse 84, temperature 36.6 °C (97.9 °F), temperature source Temporal, resp. rate 16, height 1.626 m (5' 4\"), weight 68.9 kg (152 lb), SpO2 94 %, not currently breastfeeding.  General:  Normal appearing. No distress.  Pulmonary:  Clear to ausculation.  Normal effort. No rales, ronchi, or wheezing.  Cardiovascular:  Regular rate and rhythm without murmur. Carotid and radial pulses are intact and equal bilaterally.  Abdomen:  Soft, nontender, nondistended. Normal bowel sounds. Liver and spleen are not palpable  Neurologic:  Grossly nonfocal  Lymph:  No cervical, supraclavicular or axillary lymph nodes are palpable  Skin:  Warm and dry.  No obvious lesions.  Musculoskeletal:  Normal gait. No extremity cyanosis, clubbing, or edema.  Patient " with finger bandaged bandage changed at this time appears to be healing well.  Right leg with large hematoma mid-shin, swelling and significant bruising to ankle which extends down the heel, across the top of the foot and up over the ankle bone.  Full range of motion is intact with pain.  She states that she has been having difficulty with weightbearing on the right foot  Psych:  Normal mood and affect. Alert and oriented x3. Judgment and insight is normal.      PLAN:    1. Injury of right ankle, subsequent encounter  - Patient's right ankle is noted to have purple discoloration. She did not get an ankle xray done in the ED. Ordering ankle xray  - Supportive care instructions and return precautions given.   - DX-ANKLE 3+ VIEWS RIGHT; Future    2. Gastroesophageal reflux disease without esophagitis  - At recent [facility] appointment, patient was recommended to switch her omeprazole to different medication that does not affect vitamin absorption, given her history of low vitamin B12 level. She agrees to try Pepcid.  - famotidine (PEPCID) 20 MG Tab; Take 1 Tab by mouth 2 times a day.  Dispense: 60 Tab; Refill: 0    3. Essential hypertension  - Patient is requesting refill of her metoprolol.  - metoprolol SR (TOPROL XL) 25 MG TABLET SR 24 HR; Take 0.5 Tabs by mouth every day.  Dispense: 90 Tab; Refill: 3     Patient will follow up in 3 months. Recommended for patient to stop drinking. Patient is encouraged to be seen in the emergency room for chest pain, palpitations, shortness of breath, dizziness, severe abdominal pain or other concerning symptoms.      Please note that this dictation was created using voice recognition software. I have made every reasonable attempt to correct obvious errors, but I expect that there are errors of grammar and possibly content that I did not discover before finalizing the note.      Assessment/Plan  1. Injury of right ankle, subsequent encounter  DX-ANKLE 3+ VIEWS RIGHT   2.  Gastroesophageal reflux disease without esophagitis  famotidine (PEPCID) 20 MG Tab   3. Essential hypertension  metoprolol SR (TOPROL XL) 25 MG TABLET SR 24 HR         I have placed the below orders and discussed them with an approved delegating provider. The MA is performing the below orders under the direction of Dr. Ramey.       Den ROLDAN (Scribe), am scribing for, and in the presence of, DEJA Govea    Electronically signed by: Den Vazquez ([place]), 4/2/2019    Marcelo ROLDAN APRN personally performed the services described in this documentation, as scribed by Den Vazquez in my presence, and it is both accurate and complete.

## 2019-04-02 NOTE — TELEPHONE ENCOUNTER
VOICEMAIL  1. Caller Name: Thalia Horton                        Call Back Number: [phone] (home)       2. Message: Called and left patient a message to call back to get lab results . Lm     3. Patient approves office to leave a detailed voicemail/MyChart message: N\A

## 2019-04-03 NOTE — TELEPHONE ENCOUNTER
Phone Number Called: [phone] (home)       Message: Called and left patient a message. lm    Left Message for patient to call back: yes

## 2019-04-04 NOTE — TELEPHONE ENCOUNTER
Phone Number Called: [phone]    Message: Son called back. I let him know the results. He was worried for mom because Hematoma on the R leg is bigger and worse now. They wanted to know if patient should come back in. Should patient go to ER? Please Advise. Lm     Left Message for patient to call back: no

## 2019-04-04 NOTE — TELEPHONE ENCOUNTER
Phone Number Called: [phone] (home)       Message: Patient called back; No questions at this. Lm     Left Message for patient to call back: no

## 2019-04-05 ENCOUNTER — APPOINTMENT (OUTPATIENT)
Dept: MEDICAL GROUP | Facility: PHYSICIAN GROUP | Age: 84
End: 2019-04-05
Payer: MEDICARE

## 2019-04-05 NOTE — TELEPHONE ENCOUNTER
I would recommend coming back in for evaluation here or at Urgent Care on Friday. Please schedule her with Dorota or they may be seen in urgent care.

## 2019-05-02 DIAGNOSIS — K21.9 GASTROESOPHAGEAL REFLUX DISEASE WITHOUT ESOPHAGITIS: ICD-10-CM

## 2019-05-06 RX ORDER — FAMOTIDINE 20 MG/1
TABLET, FILM COATED ORAL
Qty: 60 TAB | Refills: 0 | Status: SHIPPED | OUTPATIENT
Start: 2019-05-06 | End: 2019-05-28

## 2019-05-23 ENCOUNTER — TELEPHONE (OUTPATIENT)
Dept: MEDICAL GROUP | Facility: PHYSICIAN GROUP | Age: 84
End: 2019-05-23

## 2019-05-23 NOTE — TELEPHONE ENCOUNTER
1. Caller Name: Charity Arenas in law                                         Call Back Number: [phone] (home)     Called and let Charity know results to meds since she is on to discuss chart. She said patient had told her she made an apt to come see us already. Let her know she didn't and Charity made one for her. She will let celso know ok to take meds in afternoon. Lm

## 2019-05-23 NOTE — TELEPHONE ENCOUNTER
1. Caller Name: Ledy Speech Therapist Gabe                                          Call Back Number: [phone]     Ledy called and left a message. She said patient has been feeling fatigue and not being able to think straight. She wanted to know if patient can take medication in the evening instead of the morning. To see if this helps with how she is feeling. Please Advise

## 2019-05-23 NOTE — TELEPHONE ENCOUNTER
Yes. It is definitely acceptable to take the medication in the evening. Please Recommend that patient come in for an appointment if changing the time of her dose does not alleviate her symptoms.

## 2019-05-28 ENCOUNTER — OFFICE VISIT (OUTPATIENT)
Dept: MEDICAL GROUP | Facility: PHYSICIAN GROUP | Age: 84
End: 2019-05-28
Payer: MEDICARE

## 2019-05-28 VITALS
RESPIRATION RATE: 16 BRPM | HEIGHT: 64 IN | BODY MASS INDEX: 25.95 KG/M2 | HEART RATE: 74 BPM | DIASTOLIC BLOOD PRESSURE: 74 MMHG | OXYGEN SATURATION: 96 % | WEIGHT: 152 LBS | TEMPERATURE: 97.9 F | SYSTOLIC BLOOD PRESSURE: 128 MMHG

## 2019-05-28 DIAGNOSIS — I65.23 ATHEROSCLEROSIS OF BOTH CAROTID ARTERIES: ICD-10-CM

## 2019-05-28 DIAGNOSIS — K21.9 GASTROESOPHAGEAL REFLUX DISEASE WITHOUT ESOPHAGITIS: ICD-10-CM

## 2019-05-28 DIAGNOSIS — I10 ESSENTIAL HYPERTENSION: ICD-10-CM

## 2019-05-28 DIAGNOSIS — E78.2 MIXED HYPERLIPIDEMIA: ICD-10-CM

## 2019-05-28 DIAGNOSIS — E03.9 ACQUIRED HYPOTHYROIDISM: ICD-10-CM

## 2019-05-28 DIAGNOSIS — F33.41 RECURRENT MAJOR DEPRESSIVE DISORDER, IN PARTIAL REMISSION (HCC): ICD-10-CM

## 2019-05-28 PROCEDURE — 99214 OFFICE O/P EST MOD 30 MIN: CPT | Performed by: NURSE PRACTITIONER

## 2019-05-28 RX ORDER — EZETIMIBE 10 MG/1
10 TABLET ORAL
Qty: 90 TAB | Refills: 3 | Status: SHIPPED | OUTPATIENT
Start: 2019-05-28 | End: 2020-08-13 | Stop reason: SDUPTHER

## 2019-05-28 RX ORDER — METOPROLOL SUCCINATE 25 MG/1
12.5 TABLET, EXTENDED RELEASE ORAL DAILY
Qty: 90 TAB | Refills: 3 | Status: SHIPPED | OUTPATIENT
Start: 2019-05-28 | End: 2020-04-01

## 2019-05-28 RX ORDER — ATORVASTATIN CALCIUM 20 MG/1
20 TABLET, FILM COATED ORAL DAILY
Qty: 90 TAB | Refills: 2 | Status: SHIPPED | OUTPATIENT
Start: 2019-05-28 | End: 2020-08-19

## 2019-05-28 RX ORDER — ESCITALOPRAM OXALATE 10 MG/1
10 TABLET ORAL
Qty: 90 TAB | Refills: 3 | Status: SHIPPED | OUTPATIENT
Start: 2019-05-28 | End: 2020-06-01

## 2019-05-28 RX ORDER — LEVOTHYROXINE SODIUM 0.03 MG/1
25 TABLET ORAL
Qty: 90 TAB | Refills: 3 | Status: SHIPPED | OUTPATIENT
Start: 2019-05-28 | End: 2020-06-01

## 2019-05-28 NOTE — PROGRESS NOTES
Chief Complaint   Patient presents with   • Fatigue   • Fall     in the last two months    • Medication Management       HISTORY OF THE PRESENT ILLNESS: This is a 88 y.o. female established patient who presents today for follow up.    Recurrent major depressive disorder, in partial remission (HCC)  Chronic. Patient was previously started on Lexapro for chronic depression which is currently stable. However, family states that patient has been having fatigue/tiredness, confusion, and memory issues. They think the tiredness might be due to her medication. She notes taking her Lexapro at around 11 AM each day. She also notes that she was recently started on a new cholesterol medication, Zetia. She takes her cholesterol medications mid-morning each day. She notes having two recent falls. States one of the falls occurred while she was standing and her legs suddenly became weak. She tried to lean against a cabinet to help her slide down, but she fell forward. She reports having some lower back pain. She has been doing physical therapy. Denies any numbness or tinging.    Essential hypertension  Chronic. Patient is currently taking metoprolol. Blood pressure in clinic today was 128/74. No reports of medication side effects or associated symptoms regarding hypertension.     Gastroesophageal reflux disease without esophagitis  Chronic and stable. Patient states she has not been having any acid reflux lately and is therefore not currently taking Pepcid.    Atherosclerosis of both carotid arteries  Mixed hyperlipidemia  Chronic. Patient is currently taking atorvastatin and Zetia. She reports experiencing fatigue as described above but is currently unsure if these medications are contributing to the fatigue. She otherwise does not report any other associated symptoms or new concerns regarding this.    Acquired hypothyroidism  Chronic. Patient is currently taking levothyroxine 25 mcg. Except for fatigue as descibred above, no  reports of medication side effects or any other associated symptoms regarding hypothyroidism.    Past Medical History:   Diagnosis Date   • COPD (chronic obstructive pulmonary disease) (HCC)    • GERD (gastroesophageal reflux disease)    • Heart palpitations    • Hyperlipidemia    • Osteoarthritis    • Thyroid disease        Past Surgical History:   Procedure Laterality Date   • HIP REPLACEMENT, TOTAL Left        Family Status   Relation Status   • Mo    • Son Alive     Family History   Problem Relation Age of Onset   • Stroke Mother 69       Social History   Substance Use Topics   • Smoking status: Former Smoker     Packs/day: 1.00     Years: 55.00     Types: Cigarettes     Quit date: 2013   • Smokeless tobacco: Never Used   • Alcohol use 6.0 oz/week     10 Glasses of wine per week       Allergies: Penicillins    Current Outpatient Prescriptions Ordered in [facility]   Medication Sig Dispense Refill   • escitalopram (LEXAPRO) 10 MG Tab Take 1 Tab by mouth every bedtime. FOR DEPRESSION. 90 Tab 3   • metoprolol SR (TOPROL XL) 25 MG TABLET SR 24 HR Take 0.5 Tabs by mouth every day. FOR BLOOD PRESSURE. 90 Tab 3   • levothyroxine (SYNTHROID) 25 MCG Tab Take 1 Tab by mouth Every morning on an empty stomach. FOR THYROID. 90 Tab 3   • ezetimibe (ZETIA) 10 MG Tab Take 1 Tab by mouth every day. FOR CHOLESTEROL/STROKE PREVENTION 90 Tab 3   • atorvastatin (LIPITOR) 20 MG Tab Take 1 Tab by mouth every day. FOR CHOLESTEROL. 90 Tab 2   • albuterol 108 (90 Base) MCG/ACT Aero Soln inhalation aerosol Inhale 2 Puffs by mouth every 6 hours as needed for Shortness of Breath. 8.5 g 3     No current [facility]-ordered facility-administered medications on file.        Review of Systems   Constitutional: Fatigue/tiredness. Negative for fever, chills, weight loss..   Respiratory: Negative for cough, sputum production, shortness of breath and wheezing.    Cardiovascular: Negative for chest pain, palpitations, orthopnea and leg swelling.  "  Gastrointestinal: Negative for active heartburn, nausea, vomiting and abdominal pain.   Musculoskeletal: Lower back pain.  Neurological: Leg weakness. Negative for dizziness, tingling, tremors, sensory change, and headaches.   Psychiatric/Behavioral: Negative for active depression, anxiety, or memory loss.     All other systems reviewed and are negative except as in HPI.    Exam: /74 (BP Location: Left arm, Patient Position: Sitting, BP Cuff Size: Adult)   Pulse 74   Temp 36.6 °C (97.9 °F) (Temporal)   Resp 16   Ht 1.626 m (5' 4\")   Wt 68.9 kg (152 lb)   SpO2 96%   General:  Normal appearing. No distress.  Pulmonary:  Clear to ausculation.  Normal effort. No rales, ronchi, or wheezing.  Cardiovascular:  Regular rate and rhythm without murmur. Carotid and radial pulses are intact and equal bilaterally.  Neurologic:  Grossly nonfocal  Skin:  Warm and dry.  No obvious lesions.  Musculoskeletal:  Normal gait. No extremity cyanosis, clubbing, or edema.  Psych:  Normal mood and affect. Alert and oriented x3. Judgment and insight is normal.    PLAN:    1. Recurrent major depressive disorder, in partial remission (HCC)  - Stable at this time. Continue current plan of care and medications. Due to patient experiencing fatigue, advised her to take the Lexapro later in the evening.   - Refilling Lexapro  - escitalopram (LEXAPRO) 10 MG Tab; Take 1 Tab by mouth every bedtime. FOR DEPRESSION.  Dispense: 90 Tab; Refill: 3    2. Essential hypertension  - Stable. Continue current plan of care and medications.   - Counseled patient on diet and exercise. Advised low sodium diet.   - metoprolol SR (TOPROL XL) 25 MG TABLET SR 24 HR; Take 0.5 Tabs by mouth every day. FOR BLOOD PRESSURE.  Dispense: 90 Tab; Refill: 3    3. Gastroesophageal reflux disease without esophagitis  - Stable. Advised patient to continue not taking Pepcid if she is not having any acid reflux but to contact us back if she does have acid reflux.    4. " Atherosclerosis of both carotid arteries  - Continue current plan of care and medications. Informed patient the Zetia can potentially cause fatigue as a side effect. Advised her to take this medication later in the evening. Refilling Zetia.  - ezetimibe (ZETIA) 10 MG Tab; Take 1 Tab by mouth every day. FOR CHOLESTEROL/STROKE PREVENTION  Dispense: 90 Tab; Refill: 3    5. Mixed hyperlipidemia  - Continue current plan of care and medications. Advised for patient to take her cholesterol medications in the evening.  - Advised to eat low fat, low carbohydrate and high fiber diet as well as do physical exercise regularly.    - ezetimibe (ZETIA) 10 MG Tab; Take 1 Tab by mouth every day. FOR CHOLESTEROL/STROKE PREVENTION  Dispense: 90 Tab; Refill: 3  - atorvastatin (LIPITOR) 20 MG Tab; Take 1 Tab by mouth every day. FOR CHOLESTEROL.  Dispense: 90 Tab; Refill: 2    6. Acquired hypothyroidism  - Continue current plan of care and medications. Reminded patient to make sure she takes her medication in the morning 30 minutes before eating anything. Refilling Synthroid.  - levothyroxine (SYNTHROID) 25 MCG Tab; Take 1 Tab by mouth Every morning on an empty stomach. FOR THYROID.  Dispense: 90 Tab; Refill: 3     Follow-up in 1 month. Patient is encouraged to be seen in the emergency room for chest pain, palpitations, shortness of breath, dizziness, severe abdominal pain or other concerning symptoms.     Please note that this dictation was created using voice recognition software. I have made every reasonable attempt to correct obvious errors, but I expect that there are errors of grammar and possibly content that I did not discover before finalizing the note.      Assessment/Plan  1. Recurrent major depressive disorder, in partial remission (HCC)  escitalopram (LEXAPRO) 10 MG Tab   2. Essential hypertension  metoprolol SR (TOPROL XL) 25 MG TABLET SR 24 HR   3. Gastroesophageal reflux disease without esophagitis     4. Atherosclerosis  of both carotid arteries  ezetimibe (ZETIA) 10 MG Tab   5. Mixed hyperlipidemia  ezetimibe (ZETIA) 10 MG Tab    atorvastatin (LIPITOR) 20 MG Tab   6. Acquired hypothyroidism  levothyroxine (SYNTHROID) 25 MCG Tab         I have placed the below orders and discussed them with an approved delegating provider. The MA is performing the below orders under the direction of Dr. Ramey.       IDen (Scribe), am scribing for, and in the presence of, DEJA Govea    Electronically signed by: Den Vazquez ([place]), 5/28/2019    IMarcelo APRN personally performed the services described in this documentation, as scribed by Den Vazquez in my presence, and it is both accurate and complete.

## 2019-05-28 NOTE — PATIENT INSTRUCTIONS
STOP FAMOTIDINE if no heartburn    TAKE SYNTHROID every morning on an empty stomach.    TAKE METOPROLOL, LEXAPRO, ZETIA, ATORVASTATIN before BEDTIME.

## 2019-06-04 DIAGNOSIS — K21.9 GASTROESOPHAGEAL REFLUX DISEASE WITHOUT ESOPHAGITIS: ICD-10-CM

## 2019-06-04 RX ORDER — FAMOTIDINE 20 MG/1
TABLET, FILM COATED ORAL
Qty: 60 TAB | Refills: 0 | Status: SHIPPED | OUTPATIENT
Start: 2019-06-04 | End: 2019-07-02 | Stop reason: SDUPTHER

## 2019-06-27 ENCOUNTER — OFFICE VISIT (OUTPATIENT)
Dept: MEDICAL GROUP | Facility: PHYSICIAN GROUP | Age: 84
End: 2019-06-27
Payer: MEDICARE

## 2019-06-27 VITALS
BODY MASS INDEX: 24.92 KG/M2 | HEART RATE: 95 BPM | HEIGHT: 64 IN | TEMPERATURE: 97 F | DIASTOLIC BLOOD PRESSURE: 50 MMHG | OXYGEN SATURATION: 97 % | SYSTOLIC BLOOD PRESSURE: 100 MMHG | RESPIRATION RATE: 16 BRPM | WEIGHT: 146 LBS

## 2019-06-27 DIAGNOSIS — G31.84 MILD COGNITIVE IMPAIRMENT: ICD-10-CM

## 2019-06-27 DIAGNOSIS — K21.9 GASTROESOPHAGEAL REFLUX DISEASE WITHOUT ESOPHAGITIS: ICD-10-CM

## 2019-06-27 DIAGNOSIS — A08.4 VIRAL GASTROENTERITIS: ICD-10-CM

## 2019-06-27 PROCEDURE — 99214 OFFICE O/P EST MOD 30 MIN: CPT | Performed by: NURSE PRACTITIONER

## 2019-06-27 RX ORDER — ERYTHROMYCIN 5 MG/G
OINTMENT OPHTHALMIC
Refills: 0 | COMMUNITY
Start: 2019-06-20 | End: 2021-01-25

## 2019-06-27 RX ORDER — OMEPRAZOLE 20 MG/1
CAPSULE, DELAYED RELEASE ORAL
COMMUNITY
Start: 2019-06-24 | End: 2020-03-30

## 2019-06-27 NOTE — PROGRESS NOTES
"Chief Complaint   Patient presents with   • Diarrhea     x 4 days; everyday; watery    • Vomiting     since monday 3 times;    • GI Problem     Sunday night        HISTORY OF PRESENT ILLNESS: Patient is a 88 y.o. female established patient who presents today to discuss multiple issues.     Gastroesophageal reflux disease without esophagitis  Son states that patient has never had acid reflux.  Patient states that she was given omeprazole originally for a \"infection\".  She states that she never stopped taking it and states that it makes her feel better.  Patient states that she has never had heartburn or heartburn-like symptoms.  Patient is reluctant to stop omeprazole.    Mild cognitive impairment  Chronic in nature.  Stable.  Patient's morning fatigue has not changed with changing her medication as such patient will return to her original medication schedule.  Patient and family are not concerned about the continued fatigue, stating they believe it may be related to her \"not being a morning person\".  Also discussed the possibility of possible need for sleep study or overnight oximetry which patient and family declined at this time.  Patient does snore per her son.     Viral gastroenteritis  This is a new problem.  Started Monday night.  Patient states that she did have some significant diarrhea, did continue to have diarrhea through Tuesday and did have one episode of diarrhea this morning.  Patient had vomiting starting Tuesday morning which lasted all day Tuesday and part of Wednesday but states that vomiting and nausea has resolved at this time.  Patient states that bowel movements were watery but without mucus or blood.      Patient Active Problem List    Diagnosis Date Noted   • Viral gastroenteritis 06/27/2019   • Mild cognitive impairment 04/02/2019   • TIA (transient ischemic attack) 01/22/2019   • History of angina pectoris 09/13/2018   • Osteoarthritis of multiple joints 07/19/2018   • Acquired " hypothyroidism 2018   • Mixed hyperlipidemia 2018   • Atherosclerosis of both carotid arteries 2018   • Gastroesophageal reflux disease without esophagitis 2018   • Bilateral lower extremity edema 2018   • Other emphysema (HCC) 2018       Allergies:Penicillins    Current Outpatient Prescriptions   Medication Sig Dispense Refill   • erythromycin 5 MG/GM Ointment NEETU THIN LAYER IN OD QHS  0   • omeprazole (PRILOSEC) 20 MG delayed-release capsule      • famotidine (PEPCID) 20 MG Tab TAKE 1 TABLET BY MOUTH TWICE DAILY 60 Tab 0   • escitalopram (LEXAPRO) 10 MG Tab Take 1 Tab by mouth every bedtime. FOR DEPRESSION. 90 Tab 3   • metoprolol SR (TOPROL XL) 25 MG TABLET SR 24 HR Take 0.5 Tabs by mouth every day. FOR BLOOD PRESSURE. 90 Tab 3   • levothyroxine (SYNTHROID) 25 MCG Tab Take 1 Tab by mouth Every morning on an empty stomach. FOR THYROID. 90 Tab 3   • ezetimibe (ZETIA) 10 MG Tab Take 1 Tab by mouth every day. FOR CHOLESTEROL/STROKE PREVENTION 90 Tab 3   • atorvastatin (LIPITOR) 20 MG Tab Take 1 Tab by mouth every day. FOR CHOLESTEROL. 90 Tab 2   • albuterol 108 (90 Base) MCG/ACT Aero Soln inhalation aerosol Inhale 2 Puffs by mouth every 6 hours as needed for Shortness of Breath. 8.5 g 3     No current facility-administered medications for this visit.        Social History   Substance Use Topics   • Smoking status: Former Smoker     Packs/day: 1.00     Years: 55.00     Types: Cigarettes     Quit date: 2013   • Smokeless tobacco: Never Used   • Alcohol use 6.0 oz/week     10 Glasses of wine per week       Family Status   Relation Status   • Mo    • Son Alive     Family History   Problem Relation Age of Onset   • Stroke Mother 69       Review of Systems:   Constitutional:  Negative for fever, chills, weight loss and malaise/fatigue.   HEENT:  Negative for ear pain, nosebleeds, congestion, sore throat and neck pain.    Eyes:  Negative for blurred vision.   Respiratory:   "Negative for cough, sputum production, shortness of breath and wheezing.    Cardiovascular:  Negative for chest pain, palpitations, orthopnea and leg swelling.   Gastrointestinal:  Negative for heartburn, positive mild nausea, vomiting and negative abdominal pain.   Genitourinary:  Negative for dysuria, urgency and frequency.   Musculoskeletal:  Negative for myalgias, back pain and joint pain.   Skin:  Negative for rash and itching.   Neurological:  Negative for dizziness, tingling, tremors, sensory change, focal weakness and headaches.   Endo/Heme/Allergies:  Does not bruise/bleed easily.   Psychiatric/Behavioral:  Negative for depression, suicidal ideas and memory loss.  The patient is not nervous/anxious and does not have insomnia.    All other systems reviewed and are negative except as in HPI.    Exam:  /50 (BP Location: Left arm, Patient Position: Sitting, BP Cuff Size: Adult)   Pulse 95   Temp 36.1 °C (97 °F) (Temporal)   Resp 16   Ht 1.626 m (5' 4\")   Wt 66.2 kg (146 lb)   SpO2 97%   General:  Normal appearing. No distress.  Pulmonary:  Clear to ausculation.  Normal effort. No rales, ronchi, or wheezing.  Cardiovascular:  Regular rate and rhythm without murmur. Carotid and radial pulses are intact and equal bilaterally.  Abdomen:  Soft, nontender, nondistended. Normal bowel sounds. Liver and spleen are not palpable  Neurologic:  Grossly nonfocal  Lymph:  No cervical, supraclavicular or axillary lymph nodes are palpable  Skin:  Warm and dry.  No obvious lesions.  Musculoskeletal:  Normal gait. No extremity cyanosis, clubbing, or edema.  Psych:  Normal mood and affect. Alert and oriented x3. Judgment and insight is normal.      PLAN:    1. Gastroesophageal reflux disease without esophagitis  Discussed stopping Omeprazole.    2. Mild cognitive impairment  Continue monitoring.    3. Viral gastroenteritis  Counseled regarding BRAT diet  Discussed staying hydrated.   Discussed monitoring for resolution " of diarrhea if patient's diarrhea does not resolve within a week I would like her to contact our office  Discussed that patient should contact our office if she notices nausea or vomiting.    Follow-up in 3 months or sooner as needed. Patient is encouraged to be seen in the emergency room for chest pain, palpitations, shortness of breath, dizziness, severe abdominal pain or other concerning symptoms.      Please note that this dictation was created using voice recognition software. I have made every reasonable attempt to correct obvious errors, but I expect that there are errors of grammar and possibly content that I did not discover before finalizing the note.    Assessment/Plan:  1. Gastroesophageal reflux disease without esophagitis     2. Mild cognitive impairment     3. Viral gastroenteritis            I have placed the below orders and discussed them with an approved delegating provider. The MA is performing the below orders under the direction of Dr. Cheek.

## 2019-06-27 NOTE — ASSESSMENT & PLAN NOTE
"Son states that patient has never had acid reflux.  Patient states that she was given omeprazole originally for a \"infection\".  She states that she never stopped taking it and states that it makes her feel better.  Patient states that she has never had heartburn or heartburn-like symptoms.  Patient is reluctant to stop omeprazole.  "

## 2019-06-27 NOTE — ASSESSMENT & PLAN NOTE
"Chronic in nature.  Stable.  Patient's morning fatigue has not changed with changing her medication as such patient will return to her original medication schedule.  Patient and family are not concerned about the continued fatigue, stating they believe it may be related to her \"not being a morning person\".  Also discussed the possibility of possible need for sleep study or overnight oximetry which patient and family declined at this time.  Patient does snore per her son.   "

## 2019-06-27 NOTE — ASSESSMENT & PLAN NOTE
This is a new problem.  Started Monday night.  Patient states that she did have some significant diarrhea, did continue to have diarrhea through Tuesday and did have one episode of diarrhea this morning.  Patient had vomiting starting Tuesday morning which lasted all day Tuesday and part of Wednesday but states that vomiting and nausea has resolved at this time.  Patient states that bowel movements were watery but without mucus or blood.

## 2019-07-02 DIAGNOSIS — K21.9 GASTROESOPHAGEAL REFLUX DISEASE WITHOUT ESOPHAGITIS: ICD-10-CM

## 2019-07-02 RX ORDER — FAMOTIDINE 20 MG/1
TABLET, FILM COATED ORAL
Qty: 60 TAB | Refills: 0 | Status: SHIPPED | OUTPATIENT
Start: 2019-07-02 | End: 2019-08-02 | Stop reason: SDUPTHER

## 2019-08-02 DIAGNOSIS — K21.9 GASTROESOPHAGEAL REFLUX DISEASE WITHOUT ESOPHAGITIS: ICD-10-CM

## 2019-08-05 RX ORDER — FAMOTIDINE 20 MG/1
TABLET, FILM COATED ORAL
Qty: 60 TAB | Refills: 0 | Status: SHIPPED | OUTPATIENT
Start: 2019-08-05 | End: 2020-05-15

## 2019-09-26 ENCOUNTER — HOSPITAL ENCOUNTER (OUTPATIENT)
Dept: LAB | Facility: MEDICAL CENTER | Age: 84
End: 2019-09-26
Attending: NURSE PRACTITIONER
Payer: MEDICARE

## 2019-09-26 ENCOUNTER — OFFICE VISIT (OUTPATIENT)
Dept: MEDICAL GROUP | Facility: PHYSICIAN GROUP | Age: 84
End: 2019-09-26
Payer: MEDICARE

## 2019-09-26 VITALS
WEIGHT: 148 LBS | DIASTOLIC BLOOD PRESSURE: 64 MMHG | BODY MASS INDEX: 25.27 KG/M2 | HEIGHT: 64 IN | TEMPERATURE: 98.1 F | HEART RATE: 78 BPM | SYSTOLIC BLOOD PRESSURE: 110 MMHG | RESPIRATION RATE: 14 BRPM | OXYGEN SATURATION: 96 %

## 2019-09-26 DIAGNOSIS — D75.89 MACROCYTOSIS: ICD-10-CM

## 2019-09-26 DIAGNOSIS — R09.89 RUNNY NOSE: ICD-10-CM

## 2019-09-26 DIAGNOSIS — M54.2 NECK PAIN: ICD-10-CM

## 2019-09-26 DIAGNOSIS — Z23 NEED FOR VACCINATION: ICD-10-CM

## 2019-09-26 DIAGNOSIS — R53.83 FATIGUE, UNSPECIFIED TYPE: ICD-10-CM

## 2019-09-26 DIAGNOSIS — G31.84 MILD COGNITIVE IMPAIRMENT: ICD-10-CM

## 2019-09-26 LAB — VIT B12 SERPL-MCNC: 642 PG/ML (ref 211–911)

## 2019-09-26 PROCEDURE — 36415 COLL VENOUS BLD VENIPUNCTURE: CPT

## 2019-09-26 PROCEDURE — 99214 OFFICE O/P EST MOD 30 MIN: CPT | Mod: 25 | Performed by: NURSE PRACTITIONER

## 2019-09-26 PROCEDURE — 82607 VITAMIN B-12: CPT

## 2019-09-26 PROCEDURE — 90662 IIV NO PRSV INCREASED AG IM: CPT | Performed by: NURSE PRACTITIONER

## 2019-09-26 PROCEDURE — G0008 ADMIN INFLUENZA VIRUS VAC: HCPCS | Performed by: NURSE PRACTITIONER

## 2019-09-26 NOTE — PROGRESS NOTES
Chief Complaint   Patient presents with   • Weakness     when waking up; x months    • Tired     when waking up; x months      HISTORY OF THE PRESENT ILLNESS: This is a 88 y.o. Female established patient who presents today for follow up.    Fatigue  Patient presents with persistent fatigue which has been ongoing for several months. The patient states she has had little energy lately. She notes she has taken 2 pills of Vitamin B12 which has helped a little. She denies taking any D3 supplements, and admits she has not been spending a lot of time in the sun. She has been walking more frequently attempting to alleviate her fatigue.  Patient did have macrocytosis on her last blood work and has not followed up with this.    Neck Pain  Patient states that she has been feeling some stiffness in her neck in the past few weeks, and she has tried applying topical ointments to alleviate her symptoms. She reports she was able to see a physical therapist who gave her exercises to alleviate her neck stiffness, with little relief. She has tried using heating pads to alleviate her pain with some relief. Patient states she would like to continue managing this through her own efforts and at this time would not like to receive any medications.    Allergies  Patient states she has been having a runny nose more lately after moving to [place]. She would like to try to manage this through her own efforts of using a humidifier in her room and normal saline rinses.    Immunizations  Patient has not received the influenza vaccine this season.      Past Medical History:   Diagnosis Date   • COPD (chronic obstructive pulmonary disease) (HCC)    • GERD (gastroesophageal reflux disease)    • Heart palpitations    • Hyperlipidemia    • Osteoarthritis    • Thyroid disease      Past Surgical History:   Procedure Laterality Date   • HIP REPLACEMENT, TOTAL Left      Family Status   Relation Name Status   • Mo     • Son  Alive     Family History    Problem Relation Age of Onset   • Stroke Mother 69     Social History     Tobacco Use   • Smoking status: Former Smoker     Packs/day: 1.00     Years: 55.00     Pack years: 55.00     Types: Cigarettes     Last attempt to quit: 2013     Years since quittin.1   • Smokeless tobacco: Never Used   Substance Use Topics   • Alcohol use: Yes     Alcohol/week: 6.0 oz     Types: 10 Glasses of wine per week   • Drug use: No     Allergies: Penicillins    Current Outpatient Medications Ordered in Epic   Medication Sig Dispense Refill   • erythromycin 5 MG/GM Ointment NEETU THIN LAYER IN OD QHS  0   • omeprazole (PRILOSEC) 20 MG delayed-release capsule      • escitalopram (LEXAPRO) 10 MG Tab Take 1 Tab by mouth every bedtime. FOR DEPRESSION. 90 Tab 3   • metoprolol SR (TOPROL XL) 25 MG TABLET SR 24 HR Take 0.5 Tabs by mouth every day. FOR BLOOD PRESSURE. 90 Tab 3   • levothyroxine (SYNTHROID) 25 MCG Tab Take 1 Tab by mouth Every morning on an empty stomach. FOR THYROID. 90 Tab 3   • ezetimibe (ZETIA) 10 MG Tab Take 1 Tab by mouth every day. FOR CHOLESTEROL/STROKE PREVENTION 90 Tab 3   • atorvastatin (LIPITOR) 20 MG Tab Take 1 Tab by mouth every day. FOR CHOLESTEROL. 90 Tab 2   • albuterol 108 (90 Base) MCG/ACT Aero Soln inhalation aerosol Inhale 2 Puffs by mouth every 6 hours as needed for Shortness of Breath. 8.5 g 3   • famotidine (PEPCID) 20 MG Tab TAKE 1 TABLET BY MOUTH TWICE DAILY (Patient not taking: Reported on 2019) 60 Tab 0     No current Epic-ordered facility-administered medications on file.      Review of Systems   Constitutional: Positive for fatigue. Negative for fever, chills, weight loss.   HENT:  Negative for ear pain, nosebleeds, congestion, sore throat and neck pain.    Eyes:  Negative for blurred vision.   Respiratory:  Negative for cough, sputum production, shortness of breath and wheezing.    Cardiovascular:  Negative for chest pain, palpitations, orthopnea and leg swelling.   Gastrointestinal:  " Negative for heartburn, nausea, vomiting and abdominal pain.   Genitourinary:  Negative for dysuria, urgency and frequency.   Musculoskeletal:  Negative for myalgias, back pain and joint pain.   Skin:  Negative for rash and itching.   Neurological:  Negative for dizziness, tingling, tremors, sensory change, focal weakness and headaches.   Endo/Heme/Allergies:  Does not bruise/bleed easily.   Psychiatric/Behavioral:  Negative for depression, anxiety, or memory loss.     All other systems reviewed and are negative except as in HPI.    Exam: /64 (BP Location: Left arm, Patient Position: Sitting, BP Cuff Size: Adult)   Pulse 78   Temp 36.7 °C (98.1 °F) (Temporal)   Resp 14   Ht 1.626 m (5' 4\")   Wt 67.1 kg (148 lb)   SpO2 96%   General:  Normal appearing. No distress.  Pulmonary:  Clear to ausculation.  Normal effort. No rales, ronchi, or wheezing.  Cardiovascular:  Regular rate and rhythm without murmur. Carotid and radial pulses are intact and equal bilaterally.  Neurologic:  Grossly nonfocal  Skin:  Warm and dry.  No obvious lesions.  Musculoskeletal:  Normal gait. No extremity cyanosis, clubbing, or edema.  Psych:  Normal mood and affect. Alert and oriented x3. Judgment and insight is normal.    PLAN:    1. Macrocytosis  Patient presents with persistent fatigue which has been ongoing for several months. I informed patient that her fatigue may be secondary to anemia, vitamin B12 deficiency, or vitamin D3 deficiency. I advised her to continue taking B12 supplements, as this could cause no harm; and to consider taking D3 supplements. Labs have been ordered for the following visit. I encouraged her to continue her current exercise regiment of walking more, and to spend a little more time out in the sun.  - VITAMIN B12; Future    3. Mild cognitive impairment  Continue current follow-up.    4. Fatigue, unspecified type  As above.  Patient is encouraged to complete previously ordered labs.    2. Need for " vaccination  Patient will receive the influenza vaccine in the office today.  - INFLUENZA VACCINE, HIGH DOSE (65+ ONLY)    5. Runny nose  Counseled regarding conservative management, humidifier at night, over-the-counter nasal saline spray.    6. Neck pain  Counseled regarding conservative management, exercises, stretches, over-the-counter topical anti-inflammatory.    Follow-up in 6 months or sooner as needed. Patient is encouraged to be seen in the emergency room for chest pain, palpitations, shortness of breath, dizziness, severe abdominal pain or other concerning symptoms.    Please note that this dictation was created using voice recognition software. I have made every reasonable attempt to correct obvious errors, but I expect that there are errors of grammar and possibly content that I did not discover before finalizing the note.    Assessment/Plan  1. Macrocytosis  VITAMIN B12   2. Need for vaccination  INFLUENZA VACCINE, HIGH DOSE (65+ ONLY)     I have placed the below orders and discussed them with an approved delegating provider. The MA is performing the below orders under the direction of Dr. Ramey.    Robinson ROLDAN (Scribe), am scribing for, and in the presence of, DEJA Govea    Electronically signed by: Robinson Razo ([place]), 9/26/2019    Marcelo ROLDAN APRN personally performed the services described in this documentation, as scribed by Robinson Razo in my presence, and it is both accurate and complete.

## 2019-09-30 ENCOUNTER — TELEPHONE (OUTPATIENT)
Dept: MEDICAL GROUP | Facility: PHYSICIAN GROUP | Age: 84
End: 2019-09-30

## 2019-09-30 NOTE — TELEPHONE ENCOUNTER
Phone Number Called: [phone] (home)     Call outcome: left message for patient to call back regarding message below    Message: LVM for patient to call back for lab results

## 2019-09-30 NOTE — TELEPHONE ENCOUNTER
----- Message from DOE Paiz sent at 9/30/2019  1:19 PM PDT -----  Please call pt and give lab results: B12 within normal limits.

## 2019-10-01 NOTE — TELEPHONE ENCOUNTER
Phone Number Called: [phone] (home)       Call outcome: left message for patient to call back regarding message below    Message: Lab results.LM

## 2019-10-03 NOTE — TELEPHONE ENCOUNTER
Phone Number Called: [phone] (home)     Call outcome: left message for patient to call back regarding message below    Message: 3rd attempt to reach pt. Letter sent

## 2019-10-22 ENCOUNTER — TELEPHONE (OUTPATIENT)
Dept: MEDICAL GROUP | Facility: PHYSICIAN GROUP | Age: 84
End: 2019-10-22

## 2019-10-22 NOTE — TELEPHONE ENCOUNTER
Phone Number Called: [phone] (home)     Call outcome: spoke to patient regarding message below spoke to patients son as well    Message: Aware of normal results

## 2019-10-22 NOTE — TELEPHONE ENCOUNTER
VOICEMAIL  1. Caller Name: Thalia Horton                          Call Back Number: [phone] (home)     2. Message: Patient called for test results     3. Patient approves office to leave a detailed voicemail/MyChart message: N\A

## 2019-12-24 DIAGNOSIS — J43.8 OTHER EMPHYSEMA (HCC): ICD-10-CM

## 2019-12-24 RX ORDER — ALBUTEROL SULFATE 90 UG/1
2 AEROSOL, METERED RESPIRATORY (INHALATION) EVERY 6 HOURS PRN
Qty: 18 G | Refills: 0 | Status: SHIPPED | OUTPATIENT
Start: 2019-12-24 | End: 2020-01-15

## 2020-01-15 DIAGNOSIS — J43.8 OTHER EMPHYSEMA (HCC): ICD-10-CM

## 2020-01-15 RX ORDER — ALBUTEROL SULFATE 90 UG/1
AEROSOL, METERED RESPIRATORY (INHALATION)
Qty: 18 G | Refills: 0 | Status: SHIPPED | OUTPATIENT
Start: 2020-01-15 | End: 2020-03-23

## 2020-01-24 ENCOUNTER — OFFICE VISIT (OUTPATIENT)
Dept: MEDICAL GROUP | Facility: PHYSICIAN GROUP | Age: 85
End: 2020-01-24
Payer: MEDICARE

## 2020-01-24 VITALS
TEMPERATURE: 96.9 F | RESPIRATION RATE: 16 BRPM | DIASTOLIC BLOOD PRESSURE: 68 MMHG | OXYGEN SATURATION: 95 % | BODY MASS INDEX: 25.47 KG/M2 | HEART RATE: 78 BPM | HEIGHT: 64 IN | SYSTOLIC BLOOD PRESSURE: 114 MMHG | WEIGHT: 149.2 LBS

## 2020-01-24 DIAGNOSIS — M54.2 NECK PAIN: Primary | ICD-10-CM

## 2020-01-24 DIAGNOSIS — G45.9 TIA (TRANSIENT ISCHEMIC ATTACK): ICD-10-CM

## 2020-01-24 PROCEDURE — 99214 OFFICE O/P EST MOD 30 MIN: CPT | Performed by: FAMILY MEDICINE

## 2020-01-24 NOTE — ASSESSMENT & PLAN NOTE
This is a new condition.  Onset: 3 months  Location: right side of neck  Duration: intermittent   Timing: every morning  Quality: shooting   Radiation: to right side of head  Precipitating trauma: has fallen 3 times to the right  Associated symptoms: + speech difficulty - occurred once 3 months for a few seconds; + dizziness, + temple pain - once  Home treatments: tylenol - unsure if it helps; hot packs - did help in past

## 2020-01-24 NOTE — PROGRESS NOTES
Subjective:     CC: neck pain    HPI:   Thalia is a pleasant patient of Marcelo's who presents today with     Neck pain  This is a new condition.  Onset: 3 months  Location: right side of head  Duration: intermittent   Timing: every morning  Quality: shooting   Radiation: to right side of head  Precipitating trauma: has fallen 3 times to the right  Associated symptoms: + speech difficulty - occurred once 3 months for a few seconds; + dizziness, + temple pain - once  Home treatments: tylenol - unsure if it helps; hot packs - did help in past        Past Medical History:   Diagnosis Date   • COPD (chronic obstructive pulmonary disease) (HCC)    • GERD (gastroesophageal reflux disease)    • Heart palpitations    • Hyperlipidemia    • Osteoarthritis    • Thyroid disease        Social History     Tobacco Use   • Smoking status: Former Smoker     Packs/day: 1.00     Years: 55.00     Pack years: 55.00     Types: Cigarettes     Last attempt to quit: 2013     Years since quittin.5   • Smokeless tobacco: Never Used   Substance Use Topics   • Alcohol use: Yes     Alcohol/week: 6.0 oz     Types: 10 Glasses of wine per week   • Drug use: No       Current Outpatient Medications Ordered in Epic   Medication Sig Dispense Refill   • VENTOLIN  (90 Base) MCG/ACT Aero Soln inhalation aerosol INHALE 2 PUFFS BY MOUTH EVERY 6 HOURS AS NEEDED FOR SHORTNESS OF BREATH 18 g 0   • erythromycin 5 MG/GM Ointment NEETU THIN LAYER IN OD QHS  0   • escitalopram (LEXAPRO) 10 MG Tab Take 1 Tab by mouth every bedtime. FOR DEPRESSION. 90 Tab 3   • metoprolol SR (TOPROL XL) 25 MG TABLET SR 24 HR Take 0.5 Tabs by mouth every day. FOR BLOOD PRESSURE. 90 Tab 3   • levothyroxine (SYNTHROID) 25 MCG Tab Take 1 Tab by mouth Every morning on an empty stomach. FOR THYROID. 90 Tab 3   • ezetimibe (ZETIA) 10 MG Tab Take 1 Tab by mouth every day. FOR CHOLESTEROL/STROKE PREVENTION 90 Tab 3   • atorvastatin (LIPITOR) 20 MG Tab Take 1 Tab by mouth every  "day. FOR CHOLESTEROL. 90 Tab 2   • famotidine (PEPCID) 20 MG Tab TAKE 1 TABLET BY MOUTH TWICE DAILY 60 Tab 0   • omeprazole (PRILOSEC) 20 MG delayed-release capsule        No current Epic-ordered facility-administered medications on file.        Allergies:  Penicillins    Health Maintenance: deferred for PCP    ROS:  Gen: no fevers/chills  Pulm: no sob  CV: no chest pain    Objective:       Exam:  /68 (BP Location: Left arm, Patient Position: Sitting, BP Cuff Size: Adult)   Pulse 78   Temp 36.1 °C (96.9 °F) (Temporal)   Resp 16   Ht 1.626 m (5' 4\")   Wt 67.7 kg (149 lb 3.2 oz)   SpO2 95%   BMI 25.61 kg/m²  Body mass index is 25.61 kg/m².    Gen: Alert and oriented, No apparent distress.  Neck: Neck with limited ROM. +TTP over right neck with pain radiating into head.  Lungs: Normal effort, CTA bilaterally, no wheezes, rhonchi, or rales  CV: Regular rate and rhythm. No murmurs, rubs, or gallops.  Ext: No clubbing, cyanosis, edema.      Assessment & Plan:     88 y.o. female with the following -     1. Neck pain  This is a new condition.  For last 3 months she has been noticing pain in the right side of her neck that will radiate into the right side of her head.  It is an intermittent shooting pain that seems to occur every morning.  No precipitating trauma she can recall directly to the neck but she has fallen 3 times to the right hitting her head.  Also mention dizziness but that seems to occur occasionally when she stands up too quickly and I do not believe it is related to the neck pain.  She is tried Tylenol but unsure if it helps.  She has tried hot packs in the past which did help.  On exam I was able to trigger the radiation of the pain so I suspect muscle spasm.  - REFERRAL TO PHYSICAL THERAPY Reason for Therapy: Eval/Treat/Report  -Tried hot packs    2. TIA (transient ischemic attack)  This is a chronic condition.  She mentioned will discuss her neck pain that she had one episode of expressive a " aphasia with garbled speech that lasted for 30 to 60 seconds 3 months ago.  Looking back through her chart she had a similar episode in January, 2019 and she has no recollection of the episode.  She was seen by cardiology who added a second medication to really bring her cholesterol down.  She did follow-up with vascular surgery.  Carotid ultrasound showed stenosis bilaterally less than 50%.  Vascular surgery determined that no intervention was required at this time and no follow-up ultrasound or follow-up appointments were required.  Because his episode was 3 months ago I will not do any further work-up but I did strongly encourage her to come back and see us if she has another episode.      Return if symptoms worsen or fail to improve.    Please note that this dictation was created using voice recognition software. I have made every reasonable attempt to correct obvious errors, but I expect that there are errors of grammar and possibly content that I did not discover before finalizing the note.

## 2020-03-23 DIAGNOSIS — J43.8 OTHER EMPHYSEMA (HCC): ICD-10-CM

## 2020-03-23 RX ORDER — ALBUTEROL SULFATE 90 UG/1
AEROSOL, METERED RESPIRATORY (INHALATION)
Qty: 1 INHALER | Refills: 6 | Status: SHIPPED | OUTPATIENT
Start: 2020-03-23

## 2020-03-26 ENCOUNTER — OFFICE VISIT (OUTPATIENT)
Dept: MEDICAL GROUP | Facility: PHYSICIAN GROUP | Age: 85
End: 2020-03-26
Payer: MEDICARE

## 2020-03-26 VITALS
WEIGHT: 151.2 LBS | HEART RATE: 78 BPM | OXYGEN SATURATION: 92 % | RESPIRATION RATE: 16 BRPM | BODY MASS INDEX: 25.81 KG/M2 | DIASTOLIC BLOOD PRESSURE: 56 MMHG | HEIGHT: 64 IN | TEMPERATURE: 98.1 F | SYSTOLIC BLOOD PRESSURE: 100 MMHG

## 2020-03-26 DIAGNOSIS — E78.2 MIXED HYPERLIPIDEMIA: ICD-10-CM

## 2020-03-26 DIAGNOSIS — M54.2 NECK PAIN: ICD-10-CM

## 2020-03-26 DIAGNOSIS — M15.9 PRIMARY OSTEOARTHRITIS INVOLVING MULTIPLE JOINTS: ICD-10-CM

## 2020-03-26 DIAGNOSIS — E03.9 ACQUIRED HYPOTHYROIDISM: ICD-10-CM

## 2020-03-26 DIAGNOSIS — I10 ESSENTIAL HYPERTENSION: ICD-10-CM

## 2020-03-26 PROCEDURE — 99214 OFFICE O/P EST MOD 30 MIN: CPT | Performed by: NURSE PRACTITIONER

## 2020-03-26 RX ORDER — MELOXICAM 7.5 MG/1
7.5 TABLET ORAL
Qty: 90 TAB | Refills: 0 | Status: SHIPPED | OUTPATIENT
Start: 2020-03-26 | End: 2020-05-15

## 2020-03-26 ASSESSMENT — FIBROSIS 4 INDEX: FIB4 SCORE: 2.17

## 2020-03-26 ASSESSMENT — PATIENT HEALTH QUESTIONNAIRE - PHQ9
SUM OF ALL RESPONSES TO PHQ QUESTIONS 1-9: 15
CLINICAL INTERPRETATION OF PHQ2 SCORE: 6
5. POOR APPETITE OR OVEREATING: 0 - NOT AT ALL

## 2020-03-26 NOTE — PROGRESS NOTES
Chief Complaint   Patient presents with   • Follow-Up     Pain Management        HISTORY OF THE PRESENT ILLNESS: This is a 89 y.o. female established patient who presents today for the following concerns:    Primary osteoarthritis involving multiple joints  Neck pain  Patient states she has been having diffuse body pain including to her knees, back, and neck. States the neck pain radiates to the back of her head. States she has done therapy for her neck before which helped. States she has had toe pain as well which is exacerbated when walking. She attributes these pains to arthritis. States she has been taking Tylenol up to twice per day, but she is wondering if she can have something stronger to help with the pain so that she can walk more. She has not tried ibuprofen yet. Patient states she used to take meloxicam in the past which helped with her pain at the time. She denies having side effects when she took the meloxicam. States she did not have any GI issues when she was on it. Denies history of GI bleeds.    Acquired hypothyroidism  Chronic in nature. Patient continues to take the same dosage of levothyroxine. No reports of medication side effects or associated symptoms regarding hypothyroidism..      Mixed hyperlipidemia  Chronic in nature. Patient continues to take the same dosage of atorvastatin. No reports of medication side effects or associated symptoms regarding hyperlipidemia.      Essential hypertension  Chronic in nature and stable. Patient continues to take the same dosage of metoprolol. No reports of medication side effects or associated symptoms regarding hypertension. Blood pressure in clinic today was 100/56.         Past Medical History:   Diagnosis Date   • COPD (chronic obstructive pulmonary disease) (HCC)    • GERD (gastroesophageal reflux disease)    • Heart palpitations    • Hyperlipidemia    • Osteoarthritis    • Thyroid disease        Past Surgical History:   Procedure Laterality Date   •  HIP REPLACEMENT, TOTAL Left        Family Status   Relation Name Status   • Mo     • Son  Alive     Family History   Problem Relation Age of Onset   • Stroke Mother 69       Social History     Tobacco Use   • Smoking status: Former Smoker     Packs/day: 1.00     Years: 55.00     Pack years: 55.00     Types: Cigarettes     Last attempt to quit: 2013     Years since quittin.6   • Smokeless tobacco: Never Used   Substance Use Topics   • Alcohol use: Yes     Alcohol/week: 6.0 oz     Types: 10 Glasses of wine per week   • Drug use: No       Allergies: Penicillins    Current Outpatient Medications Ordered in Epic   Medication Sig Dispense Refill   • ASPIRIN 81 PO Take 81 mg by mouth every day.     • Multiple Vitamins-Minerals (MULTIVITAMIN ADULT PO) Take  by mouth every day. Indications: Centrum     • meloxicam (MOBIC) 7.5 MG Tab Take 1 Tab by mouth 1 time daily as needed for Severe Pain. 90 Tab 0   • albuterol 108 (90 Base) MCG/ACT Aero Soln inhalation aerosol INHALE 2 PUFFS BY MOUTH EVERY 6 HOURS AS NEEDED FOR SHORTNESS OF BREATH 1 Inhaler 6   • omeprazole (PRILOSEC) 20 MG delayed-release capsule      • escitalopram (LEXAPRO) 10 MG Tab Take 1 Tab by mouth every bedtime. FOR DEPRESSION. 90 Tab 3   • metoprolol SR (TOPROL XL) 25 MG TABLET SR 24 HR Take 0.5 Tabs by mouth every day. FOR BLOOD PRESSURE. 90 Tab 3   • levothyroxine (SYNTHROID) 25 MCG Tab Take 1 Tab by mouth Every morning on an empty stomach. FOR THYROID. 90 Tab 3   • ezetimibe (ZETIA) 10 MG Tab Take 1 Tab by mouth every day. FOR CHOLESTEROL/STROKE PREVENTION 90 Tab 3   • atorvastatin (LIPITOR) 20 MG Tab Take 1 Tab by mouth every day. FOR CHOLESTEROL. 90 Tab 2   • famotidine (PEPCID) 20 MG Tab TAKE 1 TABLET BY MOUTH TWICE DAILY (Patient not taking: Reported on 3/26/2020) 60 Tab 0   • erythromycin 5 MG/GM Ointment NEETU THIN LAYER IN OD QHS  0     No current [facility]-ordered facility-administered medications on file.        Review of Systems  "  Constitutional: Negative for fever, chills, weight loss and malaise/fatigue.   Respiratory: Negative for cough, sputum production, and wheezing.    Cardiovascular: Negative for chest pain, palpitations, orthopnea and leg swelling.   Gastrointestinal: Negative for heartburn, nausea, vomiting and abdominal pain.   Musculoskeletal: Diffuse body/joint pain (including knees, back, and neck).   Neurological: Negative for dizziness, tingling, tremors, sensory change, focal weakness and headaches.     All other systems reviewed and are negative except as in HPI.    Exam: /56 (BP Location: Left arm, Patient Position: Sitting, BP Cuff Size: Adult)   Pulse 78   Temp 36.7 °C (98.1 °F) (Temporal)   Resp 16   Ht 1.626 m (5' 4\")   Wt 68.6 kg (151 lb 3.2 oz)   SpO2 92%   General:  Normal appearing. No distress.  Pulmonary:  Clear to ausculation.  Normal effort. No rales, ronchi, or wheezing.  Cardiovascular:  Regular rate and rhythm without murmur. Carotid and radial pulses are intact and equal bilaterally.  Neurologic:  Grossly nonfocal  Skin:  Warm and dry.  No obvious lesions.  Musculoskeletal:  Normal gait. No extremity cyanosis, clubbing, or edema.  Psych:  Normal mood and affect. Alert and oriented x3. Judgment and insight is normal.    PLAN:    1. Primary osteoarthritis involving multiple joints  2. Neck pain  - Patient states she has been taking Tylenol without adequate relief of her pain. She has tried Meloxicam in the past with good results and no side effects. She denies having any GI issues when she was on Meloxicam in the past. Denies history of GI bleeds. Informed her she can be prescribed meloxicam to see if it helps with her pain. Reviewed the risks and benefits as well as potential side effects of medication with patient. Advised her to let us know if she experiences GI issues while on the medication.  - meloxicam (MOBIC) 7.5 MG Tab; Take 1 Tab by mouth 1 time daily as needed for Severe Pain.  " Dispense: 90 Tab; Refill: 0    3. Acquired hypothyroidism  - Patient has been stable with current management. We will make no changes for now. Ordered updated labs.  - CBC WITH DIFFERENTIAL; Future  - Comp Metabolic Panel; Future  - TSH WITH REFLEX TO FT4; Future    4. Mixed hyperlipidemia  - Patient has been stable with current management. We will make no changes for now. Ordered updated lab.  - Lipid Profile; Future    5. Essential hypertension  - Patient has been stable with current management. We will make no changes for now.   - Counseled patient on diet and exercise. Advised low sodium diet.   - CBC WITH DIFFERENTIAL; Future  - Comp Metabolic Panel; Future     Patient will follow up in one month. She is encouraged to be seen in the emergency room for chest pain, palpitations, shortness of breath, dizziness, severe abdominal pain or other concerning symptoms.     Please note that this dictation was created using voice recognition software. I have made every reasonable attempt to correct obvious errors, but I expect that there are errors of grammar and possibly content that I did not discover before finalizing the note.      Assessment/Plan  1. Primary osteoarthritis involving multiple joints  meloxicam (MOBIC) 7.5 MG Tab   2. Neck pain  meloxicam (MOBIC) 7.5 MG Tab   3. Acquired hypothyroidism  CBC WITH DIFFERENTIAL    Comp Metabolic Panel    TSH WITH REFLEX TO FT4   4. Mixed hyperlipidemia  Lipid Profile   5. Essential hypertension  CBC WITH DIFFERENTIAL    Comp Metabolic Panel         I have placed the below orders and discussed them with an approved delegating provider. The MA is performing the below orders under the direction of Dr. Ramey.       Den ROLDAN (Scribe), am scribing for, and in the presence of, DEJA Govea    Electronically signed by: Den Vazqeuz (Scribe), 3/26/2020    Marcelo ROLDAN APRN personally performed the services described in this  documentation, as scribed by Den Vazquez in my presence, and it is both accurate and complete.

## 2020-03-30 RX ORDER — OMEPRAZOLE 20 MG/1
CAPSULE, DELAYED RELEASE ORAL
Qty: 90 CAP | Refills: 3 | Status: SHIPPED | OUTPATIENT
Start: 2020-03-30 | End: 2021-03-29

## 2020-04-01 RX ORDER — METOPROLOL SUCCINATE 25 MG/1
TABLET, EXTENDED RELEASE ORAL
Qty: 90 TAB | Refills: 0 | Status: SHIPPED | OUTPATIENT
Start: 2020-04-01 | End: 2020-08-13 | Stop reason: SDUPTHER

## 2020-04-09 PROBLEM — R26.89 BALANCE PROBLEM: Status: ACTIVE | Noted: 2020-04-09

## 2020-04-09 PROBLEM — G89.29 CHRONIC PAIN OF BOTH SHOULDERS: Status: ACTIVE | Noted: 2020-04-09

## 2020-04-09 PROBLEM — M25.511 CHRONIC PAIN OF BOTH SHOULDERS: Status: ACTIVE | Noted: 2020-04-09

## 2020-04-09 PROBLEM — M25.512 CHRONIC PAIN OF BOTH SHOULDERS: Status: ACTIVE | Noted: 2020-04-09

## 2020-04-27 ENCOUNTER — APPOINTMENT (OUTPATIENT)
Dept: MEDICAL GROUP | Facility: PHYSICIAN GROUP | Age: 85
End: 2020-04-27
Payer: MEDICARE

## 2020-04-29 ENCOUNTER — OFFICE VISIT (OUTPATIENT)
Dept: MEDICAL GROUP | Facility: PHYSICIAN GROUP | Age: 85
End: 2020-04-29
Payer: MEDICARE

## 2020-04-29 VITALS
HEART RATE: 72 BPM | DIASTOLIC BLOOD PRESSURE: 60 MMHG | HEIGHT: 64 IN | TEMPERATURE: 98.2 F | WEIGHT: 148.4 LBS | SYSTOLIC BLOOD PRESSURE: 114 MMHG | OXYGEN SATURATION: 93 % | BODY MASS INDEX: 25.33 KG/M2 | RESPIRATION RATE: 16 BRPM

## 2020-04-29 DIAGNOSIS — I10 ESSENTIAL HYPERTENSION: ICD-10-CM

## 2020-04-29 DIAGNOSIS — M15.9 PRIMARY OSTEOARTHRITIS INVOLVING MULTIPLE JOINTS: ICD-10-CM

## 2020-04-29 DIAGNOSIS — M54.2 NECK PAIN: ICD-10-CM

## 2020-04-29 DIAGNOSIS — G47.19 EXCESSIVE DAYTIME SLEEPINESS: ICD-10-CM

## 2020-04-29 DIAGNOSIS — R26.89 BALANCE PROBLEM: ICD-10-CM

## 2020-04-29 PROCEDURE — 99214 OFFICE O/P EST MOD 30 MIN: CPT | Performed by: NURSE PRACTITIONER

## 2020-04-29 ASSESSMENT — FIBROSIS 4 INDEX: FIB4 SCORE: 2.17

## 2020-04-30 NOTE — ASSESSMENT & PLAN NOTE
Continued.  Patient states that this has become significantly better.  Patient states that currently Tylenol is making this better.  Patient states that she did not do physical therapy and does not want to try this again.  States there is no tenderness at this time.

## 2020-04-30 NOTE — ASSESSMENT & PLAN NOTE
Chronic in nature.  Stable at this time.  Patient states that she has had a little bit of dizziness recently but states that this is unchanged.  States that she has noticed herself feeling a little off balance in the mornings.

## 2020-04-30 NOTE — ASSESSMENT & PLAN NOTE
Esther reports that she has noticed herself feeling a lot more tired throughout the day states that she will feel great at approximately 2 PM but generally feels unwell during the morning hours.  Patient states she takes her medications at the same time each morning and does not feel that medications are contributing to to her feelings of unwellness.  Patient states that she does not notice herself waking up in the middle of the night states she has not felt short of breath.  Son is concerned with possible snoring.

## 2020-04-30 NOTE — PROGRESS NOTES
Chief Complaint   Patient presents with   • Follow-Up       HISTORY OF PRESENT ILLNESS: Patient is a 89 y.o. female established patient who presents today to follow-up      Neck pain  Continued.  Patient states that this has become significantly better.  Patient states that currently Tylenol is making this better.  Patient states that she did not do physical therapy and does not want to try this again.  States there is no tenderness at this time.    Essential hypertension  Running in nature.  Stable.  Blood pressure 114/60 today.  Patient is very frustrated as she states multiple of her pills have recently changed and notes that they all look very similar at this time.  Patient states that she has been working very hard to make sure that she takes them all correctly.  Patient denies chest pain, palpitations, dizziness, blurry vision    Balance problem  Chronic in nature.  Stable at this time.  Patient states that she has had a little bit of dizziness recently but states that this is unchanged.  States that she has noticed herself feeling a little off balance in the mornings.    Excessive daytime sleepiness  Esther reports that she has noticed herself feeling a lot more tired throughout the day states that she will feel great at approximately 2 PM but generally feels unwell during the morning hours.  Patient states she takes her medications at the same time each morning and does not feel that medications are contributing to to her feelings of unwellness.  Patient states that she does not notice herself waking up in the middle of the night states she has not felt short of breath.  Son is concerned with possible snoring.      Patient Active Problem List    Diagnosis Date Noted   • Excessive daytime sleepiness 04/29/2020   • Chronic pain of both shoulders 04/09/2020   • Balance problem 04/09/2020   • Essential hypertension 03/26/2020   • Neck pain 01/24/2020   • Viral gastroenteritis 06/27/2019   • Mild cognitive  impairment 04/02/2019   • TIA (transient ischemic attack) 01/22/2019   • History of angina pectoris 09/13/2018   • Osteoarthritis of multiple joints 07/19/2018   • Acquired hypothyroidism 07/19/2018   • Mixed hyperlipidemia 07/19/2018   • Atherosclerosis of both carotid arteries 07/19/2018   • Gastroesophageal reflux disease without esophagitis 07/19/2018   • Bilateral lower extremity edema 07/19/2018   • Other emphysema (HCC) 07/19/2018       Allergies:Penicillins    Current Outpatient Medications   Medication Sig Dispense Refill   • metoprolol SR (TOPROL XL) 25 MG TABLET SR 24 HR TAKE 1/2 TABLET BY MOUTH EVERY DAY 90 Tab 0   • omeprazole (PRILOSEC) 20 MG delayed-release capsule TAKE 1 CAPSULE BY MOUTH EVERY DAY 90 Cap 3   • ASPIRIN 81 PO Take 81 mg by mouth every day.     • Multiple Vitamins-Minerals (MULTIVITAMIN ADULT PO) Take  by mouth every day. Indications: Centrum     • albuterol 108 (90 Base) MCG/ACT Aero Soln inhalation aerosol INHALE 2 PUFFS BY MOUTH EVERY 6 HOURS AS NEEDED FOR SHORTNESS OF BREATH 1 Inhaler 6   • erythromycin 5 MG/GM Ointment NEETU THIN LAYER IN OD QHS  0   • escitalopram (LEXAPRO) 10 MG Tab Take 1 Tab by mouth every bedtime. FOR DEPRESSION. 90 Tab 3   • levothyroxine (SYNTHROID) 25 MCG Tab Take 1 Tab by mouth Every morning on an empty stomach. FOR THYROID. 90 Tab 3   • ezetimibe (ZETIA) 10 MG Tab Take 1 Tab by mouth every day. FOR CHOLESTEROL/STROKE PREVENTION 90 Tab 3   • atorvastatin (LIPITOR) 20 MG Tab Take 1 Tab by mouth every day. FOR CHOLESTEROL. 90 Tab 2   • meloxicam (MOBIC) 7.5 MG Tab Take 1 Tab by mouth 1 time daily as needed for Severe Pain. (Patient not taking: Reported on 4/29/2020) 90 Tab 0   • famotidine (PEPCID) 20 MG Tab TAKE 1 TABLET BY MOUTH TWICE DAILY (Patient not taking: Reported on 3/26/2020) 60 Tab 0     No current facility-administered medications for this visit.        Social History     Tobacco Use   • Smoking status: Former Smoker     Packs/day: 1.00     Years:  "55.00     Pack years: 55.00     Types: Cigarettes     Last attempt to quit: 2013     Years since quittin.7   • Smokeless tobacco: Never Used   Substance Use Topics   • Alcohol use: Yes     Alcohol/week: 6.0 oz     Types: 10 Glasses of wine per week   • Drug use: No       Family Status   Relation Name Status   • Mo     • Son  Alive     Family History   Problem Relation Age of Onset   • Stroke Mother 69       Review of Systems:   Constitutional:  Negative for fever, chills, weight loss and malaise/fatigue.   HEENT:  Negative for ear pain, nosebleeds, congestion, sore throat and neck pain.    Eyes:  Negative for blurred vision.   Respiratory:  Negative for cough, sputum production, shortness of breath and wheezing.    Cardiovascular:  Negative for chest pain, palpitations, orthopnea and leg swelling.   Gastrointestinal:  Negative for heartburn, nausea, vomiting and abdominal pain.   Genitourinary:  Negative for dysuria, urgency and frequency.   Musculoskeletal:  Negative for myalgias, back pain and joint pain.   Skin:  Negative for rash and itching.   Neurological: Positive for dizziness, negative tingling, tremors, sensory change, focal weakness and headaches.   Endo/Heme/Allergies:  Does not bruise/bleed easily.   Psychiatric/Behavioral:  Negative for depression, suicidal ideas and memory loss.  The patient is not nervous/anxious and does not have insomnia.    All other systems reviewed and are negative except as in HPI.    Exam:  /60 (BP Location: Left arm, Patient Position: Sitting, BP Cuff Size: Adult)   Pulse 72   Temp 36.8 °C (98.2 °F) (Temporal)   Resp 16   Ht 1.626 m (5' 4\")   Wt 67.3 kg (148 lb 6.4 oz)   SpO2 93%   General:  Normal appearing. No distress.  Pulmonary:  Clear to ausculation.  Normal effort. No rales, ronchi, or wheezing.  Cardiovascular:  Regular rate and rhythm without murmur. Carotid and radial pulses are intact and equal bilaterally.  Neurologic:  Grossly " nonfocal  Lymph:  No cervical, supraclavicular or axillary lymph nodes are palpable  Skin:  Warm and dry.  No obvious lesions.  Musculoskeletal:  Normal gait. No extremity cyanosis, clubbing, or edema.  Psych:  Normal mood and affect. Alert and oriented x3. Judgment and insight is normal.      PLAN:    1. Primary osteoarthritis involving multiple joints  2. Neck pain  Continue Tylenol as needed.    3. Essential hypertension  Continue current medication.    4. Balance problem  We will continue to monitor patient declines physical therapy at this time.    5. Excessive daytime sleepiness  Plan at this time to check an overnight oximetry to determine if patient is oxygenating well on her sleep.  - Overnight Oximetry; Future      Please note that this dictation was created using voice recognition software. I have made every reasonable attempt to correct obvious errors, but I expect that there are errors of grammar and possibly content that I did not discover before finalizing the note.    Assessment/Plan:  1. Primary osteoarthritis involving multiple joints     2. Neck pain     3. Essential hypertension     4. Balance problem     5. Excessive daytime sleepiness  Overnight Oximetry    CANCELED: Overnight Oximetry          I have placed the below orders and discussed them with an approved delegating provider. The MA is performing the below orders under the direction of Dr. Ramey.

## 2020-04-30 NOTE — ASSESSMENT & PLAN NOTE
Running in nature.  Stable.  Blood pressure 114/60 today.  Patient is very frustrated as she states multiple of her pills have recently changed and notes that they all look very similar at this time.  Patient states that she has been working very hard to make sure that she takes them all correctly.  Patient denies chest pain, palpitations, dizziness, blurry vision

## 2020-05-03 ENCOUNTER — APPOINTMENT (OUTPATIENT)
Dept: RADIOLOGY | Facility: MEDICAL CENTER | Age: 85
End: 2020-05-03
Attending: EMERGENCY MEDICINE
Payer: MEDICARE

## 2020-05-03 ENCOUNTER — HOSPITAL ENCOUNTER (EMERGENCY)
Facility: MEDICAL CENTER | Age: 85
End: 2020-05-03
Attending: EMERGENCY MEDICINE
Payer: MEDICARE

## 2020-05-03 VITALS
HEIGHT: 64 IN | OXYGEN SATURATION: 97 % | DIASTOLIC BLOOD PRESSURE: 97 MMHG | SYSTOLIC BLOOD PRESSURE: 198 MMHG | TEMPERATURE: 97.2 F | HEART RATE: 90 BPM | RESPIRATION RATE: 20 BRPM | WEIGHT: 148.4 LBS | BODY MASS INDEX: 25.33 KG/M2

## 2020-05-03 DIAGNOSIS — S00.83XA CONTUSION OF FOREHEAD, INITIAL ENCOUNTER: ICD-10-CM
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
## 2020-05-03 PROCEDURE — 99284 EMERGENCY DEPT VISIT MOD MDM: CPT

## 2020-05-03 PROCEDURE — 70450 CT HEAD/BRAIN W/O DYE: CPT

## 2020-05-03 ASSESSMENT — FIBROSIS 4 INDEX: FIB4 SCORE: 2.17

## 2020-05-04 NOTE — ED TRIAGE NOTES
"Chief Complaint   Patient presents with   • T-5000 FALL       BIBA for above complaint. Per report patient had a MGLF fall at home. -loc. - blood thinners. Patient A+Ox4. VSS. Large bump to left side of forehead. Patient to blue 13 and placed on monitor. Report given to Esther MARES.     Blood Pressure : 159/107, Pulse: 89, Respiration: 16, Temperature: 36.2 °C (97.2 °F), Height: 162.6 cm (5' 4\"), Weight: 67.3 kg (148 lb 6.4 oz), BMI (Calculated): 25.47, BSA (Calculated): 1.7    "

## 2020-05-04 NOTE — ED NOTES
MD aware of pt's BP. MD is aware that patient usually takes her anti-hypertensive medications around 2200 and has not yet taken them tonight.

## 2020-05-04 NOTE — ED NOTES
Pt son is here waiting in the parking Lot, would like an update when possible. Pt son name is Freddie Fam phone number is [phone].

## 2020-05-04 NOTE — DISCHARGE PLANNING
Medical Social Work    Referral: TBI    Intervention: Pt is an 89 year old female brought in by [facility] from home after a GLF.  Pt is Thalia Horton (: 1931).  Pt's emergency contact is her son, Freddie ([phone]) who is apparently waiting in the parking lot for an update.     Plan: SW will follow as needed.

## 2020-05-04 NOTE — ED PROVIDER NOTES
"ED Provider Note    CHIEF COMPLAINT  Chief Complaint   Patient presents with   • T-5000 FALL       HPI  Thalia Horton is a 89 y.o. female who presents with left forehead contusion following a trip and fall.  Denies loss of consciousness.  Denies syncope.  No headache.  No chest pain or trouble breathing.  Denies neck pain.  No numbness or weakness.  Denies upper or lower extremity pain though does have an abrasion to her left forearm.    She states that she was out on her balcony and turned quickly and lost her balance.  She typically uses a walker on the first floor and she was on the second floor where she did not have her walker present.    REVIEW OF SYSTEMS  See HPI for further details. All other systems are negative.     PAST MEDICAL HISTORY   has a past medical history of COPD (chronic obstructive pulmonary disease) (HCC), GERD (gastroesophageal reflux disease), Heart palpitations, Hyperlipidemia, Osteoarthritis, and Thyroid disease.    SOCIAL HISTORY  Social History     Tobacco Use   • Smoking status: Former Smoker     Packs/day: 1.00     Years: 55.00     Pack years: 55.00     Types: Cigarettes     Last attempt to quit: 2013     Years since quittin.7   • Smokeless tobacco: Never Used   Substance and Sexual Activity   • Alcohol use: Yes     Alcohol/week: 6.0 oz     Types: 10 Glasses of wine per week   • Drug use: No   • Sexual activity: Never       SURGICAL HISTORY   has a past surgical history that includes hip replacement, total (Left).    CURRENT MEDICATIONS  Home Medications    **Home medications have not yet been reviewed for this encounter**         ALLERGIES  Allergies   Allergen Reactions   • Penicillins Vomiting       PHYSICAL EXAM  VITAL SIGNS: BP (!) 198/97   Pulse 90   Temp 36.2 °C (97.2 °F)   Resp 20   Ht 1.626 m (5' 4\")   Wt 67.3 kg (148 lb 6.4 oz)   LMP  (LMP Unknown)   SpO2 97%   BMI 25.47 kg/m²   Pulse ox interpretation: I interpret this pulse ox as " normal.  Constitutional: Alert in no apparent distress.  HENT: Large hematoma to the left forehead, Bilateral external ears normal, Nose normal.   Eyes: Pupils are equal and reactive, Conjunctiva normal, Non-icteric.   Neck: Normal range of motion, No tenderness, Supple, No stridor.   Cardiovascular: Regular rate and rhythm.   Thorax & Lungs: Normal breath sounds, No respiratory distress, No wheezing, No chest tenderness.   Abdomen: Bowel sounds normal, Soft, No tenderness, No masses, No pulsatile masses. No peritoneal signs.  Skin: Warm, Dry, No erythema, No rash.   Extremities: Intact distal pulses, No edema, No tenderness, No cyanosis  Musculoskeletal: Good range of motion in all major joints. No tenderness to palpation or major deformities noted.   Neurologic: Alert, Normal motor function and gait, Normal sensory function, No focal deficits noted.       DIAGNOSTIC STUDIES / PROCEDURES      RADIOLOGY  CT-HEAD W/O   Final Result         1.  No acute intracranial findings. No evidence of acute hemorrhage or mass lesion.      2. Cerebral atrophy.      3.  White matter lucencies most consistent with chronic small vessel ischemic change.      4. Large left forehead scalp hematoma.                  COURSE & MEDICAL DECISION MAKING    Medications - No data to display    Pertinent Labs & Imaging studies reviewed. (See chart for details)  89 y.o. female presenting after a mechanical ground-level fall.  She states that she simply lost her balance.  No syncope.  No significant headache.  No vomiting.  No neck pain or tenderness.  No chest pain or trouble breathing.  Resting comfortably.  Given her age and the large hematoma to her scalp, CT of the head was performed.  No evidence of intracranial injury.  Patient is not on chronic anticoagulation.  She was monitored here in the emergency department and remained stable.  Appears stable for discharge.  Recommending occasional icing of her large hematoma to the forehead.  No  "obvious signs of globe trauma.  No evidence of facial fracture.  Patient was discharged home in stable condition.    The patient was instructed to follow-up with primary care physician for further management.  To return immediately for any worsening symptoms or development of any other concerning signs or symptoms. The patient verbalizes understanding in their own words.    BP (!) 198/97   Pulse 90   Temp 36.2 °C (97.2 °F)   Resp 20   Ht 1.626 m (5' 4\")   Wt 67.3 kg (148 lb 6.4 oz)   LMP  (LMP Unknown)   SpO2 97%   BMI 25.47 kg/m²     The patient was referred to primary care where they will receive further BP management.  The patient is due for her routine blood pressure medications.  She will go home and take her routine medications as prescribed by her primary care physician    Marcelo Isaacs, A.P.R.N.  [address]  [facility] [id number] [phone]    Schedule an appointment as soon as possible for a visit       [facility], Emergency Dept  [address] [place] [id number] [phone]    As needed, If symptoms worsen      FINAL IMPRESSION  1. Fall, initial encounter    2. Contusion of forehead, initial encounter            Electronically signed by: [place] M.D., 5/3/2020 7:38 PM    "

## 2020-05-04 NOTE — ED NOTES
Pt is alert and oriented x4 on discharge. All discharge instructions were explained to the patient and son, Freddie. Pt and family verbalized understanding. Pt instructed to follow up with primary care provider. VSS.

## 2020-05-07 ENCOUNTER — OFFICE VISIT (OUTPATIENT)
Dept: MEDICAL GROUP | Facility: PHYSICIAN GROUP | Age: 85
End: 2020-05-07
Payer: MEDICARE

## 2020-05-07 VITALS
SYSTOLIC BLOOD PRESSURE: 100 MMHG | BODY MASS INDEX: 25.27 KG/M2 | OXYGEN SATURATION: 97 % | HEART RATE: 88 BPM | RESPIRATION RATE: 18 BRPM | DIASTOLIC BLOOD PRESSURE: 62 MMHG | TEMPERATURE: 97.1 F | WEIGHT: 148 LBS | HEIGHT: 64 IN

## 2020-05-07 DIAGNOSIS — R42 DIZZINESS: ICD-10-CM
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
## 2020-05-07 DIAGNOSIS — I10 ESSENTIAL HYPERTENSION: ICD-10-CM

## 2020-05-07 PROCEDURE — 99214 OFFICE O/P EST MOD 30 MIN: CPT | Performed by: NURSE PRACTITIONER

## 2020-05-07 ASSESSMENT — FIBROSIS 4 INDEX: FIB4 SCORE: 2.17
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
## 2020-05-08 NOTE — ASSESSMENT & PLAN NOTE
"Patient is following up today related to the emergency room visit after a fall at home.  Patient states that she was sitting on her balcony and turned quickly to prevent her door from slamming states that she started to lose her balance turned back the other way quickly trying to catch the edge of the balcony to hold herself up and fell.  Patient reports severe bruising to her bilateral face as well as a knot on her forehead in the ER patient did have a CT scan that did not indicate any bleeding in her brain or skull fracture.  Patient states she is feeling much better today though she has continued to notice dizziness when she stands up quickly patient states \"I think my issue is that I moved too quickly\".  Discussed with patient that her blood pressure is low and there is some concern that that could be contributing to her dizziness discussed with patient possibility of completing echocardiogram which she declines at this time.  Also discussed possibility of stopping Lexapro related to possibility of side effect of dizziness.  Orthostatic blood pressures are checked today and are within normal limits patient does not report increasing dizziness with movements of her head though states she has had this issue in the past.  Patient describes dizziness as feeling lightheaded no spinning.  Lightheaded feeling is not elicited during orthostatic blood pressures, but patient does report feeling mildly lightheaded when she stands up quickly during exam.  Denies any progression of symptoms including darkening of her vision or feeling like she might pass out.  Son reports that patient wear socks with her house slippers and that he feels this may decrease her traction states that he does notice her shuffling when she walks.  "

## 2020-05-08 NOTE — ASSESSMENT & PLAN NOTE
Chronic in nature.  Stable.  Blood pressure today is 100/62.  Patient denies any chest pain, palpitations, dizziness, blurry vision.  Of note patient's blood pressure has been running on the low side recently patient fell states this was not related to dizziness states she feels this was related to low recently some concern that this is related to patient's dizziness.

## 2020-05-08 NOTE — PROGRESS NOTES
"Chief Complaint   Patient presents with   • Hospital Follow-up     pt fall, pt still does not feel well, has alot of px, light headed/dizziness when standing up       HISTORY OF PRESENT ILLNESS: Patient is a 89 y.o. female established patient who presents today to discuss fall.    Essential hypertension  Chronic in nature.  Stable.  Blood pressure today is 100/62.  Patient denies any chest pain, palpitations, dizziness, blurry vision.  Of note patient's blood pressure has been running on the low side recently patient fell states this was not related to dizziness states she feels this was related to low recently some concern that this is related to patient's dizziness.    Fall  Patient is following up today related to the emergency room visit after a fall at home.  Patient states that she was sitting on her balcony and turned quickly to prevent her door from slamming states that she started to lose her balance turned back the other way quickly trying to catch the edge of the balcony to hold herself up and fell.  Patient reports severe bruising to her bilateral face as well as a knot on her forehead in the ER patient did have a CT scan that did not indicate any bleeding in her brain or skull fracture.  Patient states she is feeling much better today though she has continued to notice dizziness when she stands up quickly patient states \"I think my issue is that I moved too quickly\".  Discussed with patient that her blood pressure is low and there is some concern that that could be contributing to her dizziness discussed with patient possibility of completing echocardiogram which she declines at this time.  Also discussed possibility of stopping Lexapro related to possibility of side effect of dizziness.  Orthostatic blood pressures are checked today and are within normal limits patient does not report increasing dizziness with movements of her head though states she has had this issue in the past.  Patient describes " dizziness as feeling lightheaded no spinning.  Lightheaded feeling is not elicited during orthostatic blood pressures, but patient does report feeling mildly lightheaded when she stands up quickly during exam.  Denies any progression of symptoms including darkening of her vision or feeling like she might pass out.  Son reports that patient wear socks with her house slippers and that he feels this may decrease her traction states that he does notice her shuffling when she walks.      Patient Active Problem List    Diagnosis Date Noted   • Fall 05/08/2020   • Excessive daytime sleepiness 04/29/2020   • Chronic pain of both shoulders 04/09/2020   • Balance problem 04/09/2020   • Essential hypertension 03/26/2020   • Neck pain 01/24/2020   • Viral gastroenteritis 06/27/2019   • Mild cognitive impairment 04/02/2019   • TIA (transient ischemic attack) 01/22/2019   • History of angina pectoris 09/13/2018   • Osteoarthritis of multiple joints 07/19/2018   • Acquired hypothyroidism 07/19/2018   • Mixed hyperlipidemia 07/19/2018   • Atherosclerosis of both carotid arteries 07/19/2018   • Gastroesophageal reflux disease without esophagitis 07/19/2018   • Bilateral lower extremity edema 07/19/2018   • Other emphysema (HCC) 07/19/2018       Allergies:Penicillins    Current Outpatient Medications   Medication Sig Dispense Refill   • metoprolol SR (TOPROL XL) 25 MG TABLET SR 24 HR TAKE 1/2 TABLET BY MOUTH EVERY DAY 90 Tab 0   • omeprazole (PRILOSEC) 20 MG delayed-release capsule TAKE 1 CAPSULE BY MOUTH EVERY DAY 90 Cap 3   • ASPIRIN 81 PO Take 81 mg by mouth every day.     • Multiple Vitamins-Minerals (MULTIVITAMIN ADULT PO) Take  by mouth every day. Indications: Centrum     • meloxicam (MOBIC) 7.5 MG Tab Take 1 Tab by mouth 1 time daily as needed for Severe Pain. (Patient not taking: Reported on 4/29/2020) 90 Tab 0   • albuterol 108 (90 Base) MCG/ACT Aero Soln inhalation aerosol INHALE 2 PUFFS BY MOUTH EVERY 6 HOURS AS NEEDED FOR  SHORTNESS OF BREATH 1 Inhaler 6   • famotidine (PEPCID) 20 MG Tab TAKE 1 TABLET BY MOUTH TWICE DAILY (Patient not taking: Reported on 3/26/2020) 60 Tab 0   • erythromycin 5 MG/GM Ointment NEETU THIN LAYER IN OD QHS  0   • escitalopram (LEXAPRO) 10 MG Tab Take 1 Tab by mouth every bedtime. FOR DEPRESSION. 90 Tab 3   • levothyroxine (SYNTHROID) 25 MCG Tab Take 1 Tab by mouth Every morning on an empty stomach. FOR THYROID. 90 Tab 3   • ezetimibe (ZETIA) 10 MG Tab Take 1 Tab by mouth every day. FOR CHOLESTEROL/STROKE PREVENTION 90 Tab 3   • atorvastatin (LIPITOR) 20 MG Tab Take 1 Tab by mouth every day. FOR CHOLESTEROL. 90 Tab 2     No current facility-administered medications for this visit.        Social History     Tobacco Use   • Smoking status: Former Smoker     Packs/day: 1.00     Years: 55.00     Pack years: 55.00     Types: Cigarettes     Last attempt to quit: 2013     Years since quittin.8   • Smokeless tobacco: Never Used   Substance Use Topics   • Alcohol use: Yes     Alcohol/week: 6.0 oz     Types: 10 Glasses of wine per week   • Drug use: No       Family Status   Relation Name Status   • Mo     • Son  Alive     Family History   Problem Relation Age of Onset   • Stroke Mother 69       Review of Systems:   Constitutional:  Negative for fever, chills, weight loss and malaise/fatigue.   HEENT:  Negative for ear pain, nosebleeds, congestion, sore throat and neck pain.    Eyes:  Negative for blurred vision.   Respiratory:  Negative for cough, sputum production, shortness of breath and wheezing.    Cardiovascular:  Negative for chest pain, palpitations, orthopnea and leg swelling.   Gastrointestinal:  Negative for heartburn, nausea, vomiting and abdominal pain.   Genitourinary:  Negative for dysuria, urgency and frequency.   Musculoskeletal:  Negative for myalgias, back pain and joint pain.   Skin:  Negative for rash and itching.   Neurological: Positive for dizziness, negative tingling, tremors,  "sensory change, focal weakness and headaches.   Psychiatric/Behavioral:  Negative for depression (patient states that Lexapro works well. ) Suicidal ideas and positive memory loss.  The patient is not nervous/anxious and does not have insomnia.    All other systems reviewed and are negative except as in HPI.    Exam:  /62   Pulse 88   Temp 36.2 °C (97.1 °F)   Resp 18   Ht 1.626 m (5' 4\")   Wt 67.1 kg (148 lb)   SpO2 97%   General:  Normal appearing. No distress.  HEENT:  Normocephalic. Eyes conjunctiva clear lids without ptosis, pupils equal and reactive to light accommodation, ears normal shape and contour, canals are clear bilaterally, tympanic membranes are benign, nasal mucosa benign, oropharynx is without erythema, edema or exudates.   Neck:  Supple without JVD or bruit. Thyroid is not enlarged.  Pulmonary:  Clear to ausculation.  Normal effort. No rales, ronchi, or wheezing.  Cardiovascular:  Regular rate and rhythm without murmur. Carotid and radial pulses are intact and equal bilaterally.  Neurologic:  Grossly nonfocal, no nystagmus is noted, negative Romberg  Lymph:  No cervical, supraclavicular or axillary lymph nodes are palpable  Skin:  Warm and dry.  Large contusion on patient's left forehead, extensive bruising on bilateral cheeks extending down patient's left neck swelling is noted bilaterally.   musculoskeletal: Steady gait, some shuffling. No extremity cyanosis, clubbing, or edema.  Psych:  Normal mood and affect. Alert and oriented x3. Judgment and insight is normal.      PLAN:    1. Essential hypertension  Plan at this time is to stop metoprolol and follow-up in 2 weeks to determine if this improves symptoms of dizziness    2. Fall, subsequent encounter  Counseled patient regarding safety including rugs, not wearing shoes that do not provide traction, discussed picking up her feet when she walks, discussed possibility of physical therapy which patient declines at this time    3. " Dizziness  With regard to this issue discussed with patient medications that could be contributing as dizziness as a side effect including her antidepressant, metoprolol related to lowering of blood pressure, also discussed possible cardiac cause of dizziness including relation to carotid artery disease, discussed recommendation that patient follow-up with cardiology, vascular surgeon regarding this issue as well. Plan at this time is to discontinue metoprolol for 2 weeks and have patient follow-up to determine if this has an effect on her dizziness, monitor her blood pressure and pulse closely.  Counseled patient that if she has any changes when she stops the metoprolol that she should contact my office immediately.    Follow-up in 2 weeks or sooner as needed.Patient is encouraged to be seen in the emergency room for chest pain, palpitations, shortness of breath, dizziness, severe abdominal pain or other concerning symptoms.      Please note that this dictation was created using voice recognition software. I have made every reasonable attempt to correct obvious errors, but I expect that there are errors of grammar and possibly content that I did not discover before finalizing the note.    Assessment/Plan:  1. Essential hypertension     2. Fall, subsequent encounter     3. Dizziness            I have placed the below orders and discussed them with an approved delegating provider. The MA is performing the below orders under the direction of Dr. Ramey.

## 2020-05-15 ENCOUNTER — HOSPITAL ENCOUNTER (OUTPATIENT)
Facility: MEDICAL CENTER | Age: 85
End: 2020-05-16
Attending: EMERGENCY MEDICINE | Admitting: INTERNAL MEDICINE
Payer: MEDICARE

## 2020-05-15 ENCOUNTER — APPOINTMENT (OUTPATIENT)
Dept: RADIOLOGY | Facility: MEDICAL CENTER | Age: 85
End: 2020-05-15
Attending: EMERGENCY MEDICINE
Payer: MEDICARE

## 2020-05-15 ENCOUNTER — APPOINTMENT (OUTPATIENT)
Dept: CARDIOLOGY | Facility: MEDICAL CENTER | Age: 85
End: 2020-05-15
Attending: INTERNAL MEDICINE
Payer: MEDICARE

## 2020-05-15 DIAGNOSIS — I95.9 HYPOTENSION, UNSPECIFIED HYPOTENSION TYPE: ICD-10-CM
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
## 2020-05-15 DIAGNOSIS — I47.20 V-TACH (HCC): ICD-10-CM

## 2020-05-15 DIAGNOSIS — M15.9 PRIMARY OSTEOARTHRITIS INVOLVING MULTIPLE JOINTS: ICD-10-CM

## 2020-05-15 DIAGNOSIS — R06.09 DYSPNEA ON EXERTION: ICD-10-CM

## 2020-05-15 DIAGNOSIS — J42 CHRONIC BRONCHITIS, UNSPECIFIED CHRONIC BRONCHITIS TYPE (HCC): ICD-10-CM

## 2020-05-15 PROBLEM — J44.9 COPD (CHRONIC OBSTRUCTIVE PULMONARY DISEASE) (HCC): Status: ACTIVE | Noted: 2018-07-19

## 2020-05-15 LAB
ALBUMIN SERPL BCP-MCNC: 3.9 G/DL (ref 3.2–4.9)
ALBUMIN/GLOB SERPL: 1.3 G/DL
ALP SERPL-CCNC: 66 U/L (ref 30–99)
ALT SERPL-CCNC: 15 U/L (ref 2–50)
ANION GAP SERPL CALC-SCNC: 14 MMOL/L (ref 7–16)
AST SERPL-CCNC: 24 U/L (ref 12–45)
BASOPHILS # BLD AUTO: 0.6 % (ref 0–1.8)
BASOPHILS # BLD: 0.04 K/UL (ref 0–0.12)
BILIRUB SERPL-MCNC: 0.5 MG/DL (ref 0.1–1.5)
BUN SERPL-MCNC: 14 MG/DL (ref 8–22)
CALCIUM SERPL-MCNC: 9.9 MG/DL (ref 8.5–10.5)
CHLORIDE SERPL-SCNC: 98 MMOL/L (ref 96–112)
CO2 SERPL-SCNC: 25 MMOL/L (ref 20–33)
COVID ORDER STATUS COVID19: NORMAL
CREAT SERPL-MCNC: 0.7 MG/DL (ref 0.5–1.4)
D DIMER PPP IA.FEU-MCNC: 1.74 UG/ML (FEU) (ref 0–0.5)
EKG IMPRESSION: NORMAL
EOSINOPHIL # BLD AUTO: 0.06 K/UL (ref 0–0.51)
EOSINOPHIL NFR BLD: 0.9 % (ref 0–6.9)
ERYTHROCYTE [DISTWIDTH] IN BLOOD BY AUTOMATED COUNT: 46.8 FL (ref 35.9–50)
GLOBULIN SER CALC-MCNC: 3 G/DL (ref 1.9–3.5)
GLUCOSE SERPL-MCNC: 158 MG/DL (ref 65–99)
HCT VFR BLD AUTO: 40.7 % (ref 37–47)
HGB BLD-MCNC: 13.4 G/DL (ref 12–16)
IMM GRANULOCYTES # BLD AUTO: 0.02 K/UL (ref 0–0.11)
IMM GRANULOCYTES NFR BLD AUTO: 0.3 % (ref 0–0.9)
LYMPHOCYTES # BLD AUTO: 2.07 K/UL (ref 1–4.8)
LYMPHOCYTES NFR BLD: 31.5 % (ref 22–41)
MAGNESIUM SERPL-MCNC: 1.5 MG/DL (ref 1.5–2.5)
MCH RBC QN AUTO: 32.6 PG (ref 27–33)
MCHC RBC AUTO-ENTMCNC: 32.9 G/DL (ref 33.6–35)
MCV RBC AUTO: 99 FL (ref 81.4–97.8)
MONOCYTES # BLD AUTO: 0.65 K/UL (ref 0–0.85)
MONOCYTES NFR BLD AUTO: 9.9 % (ref 0–13.4)
NEUTROPHILS # BLD AUTO: 3.73 K/UL (ref 2–7.15)
NEUTROPHILS NFR BLD: 56.8 % (ref 44–72)
NRBC # BLD AUTO: 0 K/UL
NRBC BLD-RTO: 0 /100 WBC
PHOSPHATE SERPL-MCNC: 2.9 MG/DL (ref 2.5–4.5)
PLATELET # BLD AUTO: 217 K/UL (ref 164–446)
PMV BLD AUTO: 8.9 FL (ref 9–12.9)
POTASSIUM SERPL-SCNC: 3.6 MMOL/L (ref 3.6–5.5)
PROCALCITONIN SERPL-MCNC: <0.05 NG/ML
PROT SERPL-MCNC: 6.9 G/DL (ref 6–8.2)
RBC # BLD AUTO: 4.11 M/UL (ref 4.2–5.4)
SARS-COV-2 RNA RESP QL NAA+PROBE: NOTDETECTED
SODIUM SERPL-SCNC: 137 MMOL/L (ref 135–145)
SPECIMEN SOURCE: NORMAL
TROPONIN T SERPL-MCNC: 7 NG/L (ref 6–19)
TROPONIN T SERPL-MCNC: <6 NG/L (ref 6–19)
WBC # BLD AUTO: 6.6 K/UL (ref 4.8–10.8)

## 2020-05-15 PROCEDURE — 84484 ASSAY OF TROPONIN QUANT: CPT

## 2020-05-15 PROCEDURE — 700102 HCHG RX REV CODE 250 W/ 637 OVERRIDE(OP): Performed by: INTERNAL MEDICINE

## 2020-05-15 PROCEDURE — 700111 HCHG RX REV CODE 636 W/ 250 OVERRIDE (IP): Performed by: INTERNAL MEDICINE

## 2020-05-15 PROCEDURE — 71045 X-RAY EXAM CHEST 1 VIEW: CPT

## 2020-05-15 PROCEDURE — 94760 N-INVAS EAR/PLS OXIMETRY 1: CPT

## 2020-05-15 PROCEDURE — 96365 THER/PROPH/DIAG IV INF INIT: CPT | Mod: XU

## 2020-05-15 PROCEDURE — 84145 PROCALCITONIN (PCT): CPT

## 2020-05-15 PROCEDURE — 93005 ELECTROCARDIOGRAM TRACING: CPT

## 2020-05-15 PROCEDURE — G0378 HOSPITAL OBSERVATION PER HR: HCPCS

## 2020-05-15 PROCEDURE — 93306 TTE W/DOPPLER COMPLETE: CPT

## 2020-05-15 PROCEDURE — 93005 ELECTROCARDIOGRAM TRACING: CPT | Performed by: EMERGENCY MEDICINE

## 2020-05-15 PROCEDURE — U0004 COV-19 TEST NON-CDC HGH THRU: HCPCS

## 2020-05-15 PROCEDURE — 84100 ASSAY OF PHOSPHORUS: CPT

## 2020-05-15 PROCEDURE — 99285 EMERGENCY DEPT VISIT HI MDM: CPT

## 2020-05-15 PROCEDURE — 93306 TTE W/DOPPLER COMPLETE: CPT | Mod: 26 | Performed by: INTERNAL MEDICINE

## 2020-05-15 PROCEDURE — 36415 COLL VENOUS BLD VENIPUNCTURE: CPT

## 2020-05-15 PROCEDURE — 85379 FIBRIN DEGRADATION QUANT: CPT

## 2020-05-15 PROCEDURE — A9270 NON-COVERED ITEM OR SERVICE: HCPCS | Performed by: INTERNAL MEDICINE

## 2020-05-15 PROCEDURE — 96366 THER/PROPH/DIAG IV INF ADDON: CPT

## 2020-05-15 PROCEDURE — 80053 COMPREHEN METABOLIC PANEL: CPT

## 2020-05-15 PROCEDURE — G2023 SPECIMEN COLLECT COVID-19: HCPCS | Performed by: EMERGENCY MEDICINE

## 2020-05-15 PROCEDURE — 83735 ASSAY OF MAGNESIUM: CPT

## 2020-05-15 PROCEDURE — 85025 COMPLETE CBC W/AUTO DIFF WBC: CPT

## 2020-05-15 PROCEDURE — 99220 PR INITIAL OBSERVATION CARE,LEVL III: CPT | Mod: AI | Performed by: INTERNAL MEDICINE

## 2020-05-15 RX ORDER — OMEPRAZOLE 20 MG/1
20 CAPSULE, DELAYED RELEASE ORAL DAILY
Status: DISCONTINUED | OUTPATIENT
Start: 2020-05-16 | End: 2020-05-16 | Stop reason: HOSPADM

## 2020-05-15 RX ORDER — MAGNESIUM SULFATE HEPTAHYDRATE 40 MG/ML
4 INJECTION, SOLUTION INTRAVENOUS ONCE
Status: COMPLETED | OUTPATIENT
Start: 2020-05-15 | End: 2020-05-15

## 2020-05-15 RX ORDER — ATORVASTATIN CALCIUM 20 MG/1
20 TABLET, FILM COATED ORAL DAILY
Status: DISCONTINUED | OUTPATIENT
Start: 2020-05-16 | End: 2020-05-16 | Stop reason: HOSPADM

## 2020-05-15 RX ORDER — BISACODYL 10 MG
10 SUPPOSITORY, RECTAL RECTAL
Status: DISCONTINUED | OUTPATIENT
Start: 2020-05-15 | End: 2020-05-16 | Stop reason: HOSPADM

## 2020-05-15 RX ORDER — LEVOTHYROXINE SODIUM 0.03 MG/1
25 TABLET ORAL
Status: DISCONTINUED | OUTPATIENT
Start: 2020-05-16 | End: 2020-05-16 | Stop reason: HOSPADM

## 2020-05-15 RX ORDER — POLYETHYLENE GLYCOL 3350 17 G/17G
1 POWDER, FOR SOLUTION ORAL
Status: DISCONTINUED | OUTPATIENT
Start: 2020-05-15 | End: 2020-05-16 | Stop reason: HOSPADM

## 2020-05-15 RX ORDER — AZITHROMYCIN 250 MG/1
500 TABLET, FILM COATED ORAL ONCE
Status: DISCONTINUED | OUTPATIENT
Start: 2020-05-15 | End: 2020-05-15

## 2020-05-15 RX ORDER — ACETAMINOPHEN 325 MG/1
650 TABLET ORAL EVERY 6 HOURS PRN
Status: DISCONTINUED | OUTPATIENT
Start: 2020-05-15 | End: 2020-05-16 | Stop reason: HOSPADM

## 2020-05-15 RX ORDER — GUAIFENESIN/DEXTROMETHORPHAN 100-10MG/5
10 SYRUP ORAL EVERY 6 HOURS PRN
Status: DISCONTINUED | OUTPATIENT
Start: 2020-05-15 | End: 2020-05-16 | Stop reason: HOSPADM

## 2020-05-15 RX ORDER — LABETALOL HYDROCHLORIDE 5 MG/ML
10 INJECTION, SOLUTION INTRAVENOUS EVERY 4 HOURS PRN
Status: DISCONTINUED | OUTPATIENT
Start: 2020-05-15 | End: 2020-05-16 | Stop reason: HOSPADM

## 2020-05-15 RX ORDER — ALBUTEROL SULFATE 90 UG/1
2 AEROSOL, METERED RESPIRATORY (INHALATION) EVERY 4 HOURS PRN
Status: DISCONTINUED | OUTPATIENT
Start: 2020-05-15 | End: 2020-05-15

## 2020-05-15 RX ORDER — ESCITALOPRAM OXALATE 10 MG/1
10 TABLET ORAL
Status: DISCONTINUED | OUTPATIENT
Start: 2020-05-15 | End: 2020-05-16 | Stop reason: HOSPADM

## 2020-05-15 RX ORDER — ALBUTEROL SULFATE 90 UG/1
2 AEROSOL, METERED RESPIRATORY (INHALATION)
Status: DISCONTINUED | OUTPATIENT
Start: 2020-05-15 | End: 2020-05-16 | Stop reason: HOSPADM

## 2020-05-15 RX ORDER — METOPROLOL SUCCINATE 25 MG/1
12.5 TABLET, EXTENDED RELEASE ORAL DAILY
Status: DISCONTINUED | OUTPATIENT
Start: 2020-05-16 | End: 2020-05-16 | Stop reason: HOSPADM

## 2020-05-15 RX ORDER — ASPIRIN 81 MG/1
81 TABLET, CHEWABLE ORAL DAILY
Status: DISCONTINUED | OUTPATIENT
Start: 2020-05-16 | End: 2020-05-16 | Stop reason: HOSPADM

## 2020-05-15 RX ORDER — AMOXICILLIN 250 MG
2 CAPSULE ORAL 2 TIMES DAILY
Status: DISCONTINUED | OUTPATIENT
Start: 2020-05-15 | End: 2020-05-16 | Stop reason: HOSPADM

## 2020-05-15 RX ORDER — EZETIMIBE 10 MG/1
10 TABLET ORAL DAILY
Status: DISCONTINUED | OUTPATIENT
Start: 2020-05-16 | End: 2020-05-16 | Stop reason: HOSPADM

## 2020-05-15 RX ORDER — M-VIT,TX,IRON,MINS/CALC/FOLIC 27MG-0.4MG
1 TABLET ORAL DAILY
Status: DISCONTINUED | OUTPATIENT
Start: 2020-05-16 | End: 2020-05-16 | Stop reason: HOSPADM

## 2020-05-15 RX ADMIN — MAGNESIUM SULFATE IN WATER 4 G: 40 INJECTION, SOLUTION INTRAVENOUS at 18:35

## 2020-05-15 RX ADMIN — ACETAMINOPHEN 650 MG: 325 TABLET, FILM COATED ORAL at 20:17

## 2020-05-15 RX ADMIN — ESCITALOPRAM OXALATE 10 MG: 10 TABLET ORAL at 20:17

## 2020-05-15 ASSESSMENT — COGNITIVE AND FUNCTIONAL STATUS - GENERAL
SUGGESTED CMS G CODE MODIFIER MOBILITY: CJ
WALKING IN HOSPITAL ROOM: A LITTLE
CLIMB 3 TO 5 STEPS WITH RAILING: A LITTLE
MOBILITY SCORE: 20
MOVING FROM LYING ON BACK TO SITTING ON SIDE OF FLAT BED: A LITTLE
STANDING UP FROM CHAIR USING ARMS: A LITTLE
SUGGESTED CMS G CODE MODIFIER DAILY ACTIVITY: CH
DAILY ACTIVITIY SCORE: 24

## 2020-05-15 ASSESSMENT — LIFESTYLE VARIABLES
TOTAL SCORE: 0
TOTAL SCORE: 0
EVER HAD A DRINK FIRST THING IN THE MORNING TO STEADY YOUR NERVES TO GET RID OF A HANGOVER: NO
HAVE YOU EVER FELT YOU SHOULD CUT DOWN ON YOUR DRINKING: NO
HOW MANY TIMES IN THE PAST YEAR HAVE YOU HAD 5 OR MORE DRINKS IN A DAY: 0
EVER_SMOKED: YES
EVER_SMOKED: YES
ON A TYPICAL DAY WHEN YOU DRINK ALCOHOL HOW MANY DRINKS DO YOU HAVE: 1
EVER_SMOKED: YES
EVER FELT BAD OR GUILTY ABOUT YOUR DRINKING: NO
TOTAL SCORE: 0
DOES PATIENT WANT TO STOP DRINKING: NO
AVERAGE NUMBER OF DAYS PER WEEK YOU HAVE A DRINK CONTAINING ALCOHOL: 7
HAVE PEOPLE ANNOYED YOU BY CRITICIZING YOUR DRINKING: NO
ALCOHOL_USE: YES
CONSUMPTION TOTAL: NEGATIVE

## 2020-05-15 ASSESSMENT — PATIENT HEALTH QUESTIONNAIRE - PHQ9
2. FEELING DOWN, DEPRESSED, IRRITABLE, OR HOPELESS: NOT AT ALL
8. MOVING OR SPEAKING SO SLOWLY THAT OTHER PEOPLE COULD HAVE NOTICED. OR THE OPPOSITE, BEING SO FIGETY OR RESTLESS THAT YOU HAVE BEEN MOVING AROUND A LOT MORE THAN USUAL: NOT AT ALL
1. LITTLE INTEREST OR PLEASURE IN DOING THINGS: NOT AT ALL
7. TROUBLE CONCENTRATING ON THINGS, SUCH AS READING THE NEWSPAPER OR WATCHING TELEVISION: NOT AT ALL
3. TROUBLE FALLING OR STAYING ASLEEP OR SLEEPING TOO MUCH: NOT AT ALL
6. FEELING BAD ABOUT YOURSELF - OR THAT YOU ARE A FAILURE OR HAVE LET YOURSELF OR YOUR FAMILY DOWN: NOT AL ALL
4. FEELING TIRED OR HAVING LITTLE ENERGY: NOT AT ALL
9. THOUGHTS THAT YOU WOULD BE BETTER OFF DEAD, OR OF HURTING YOURSELF: NOT AT ALL
SUM OF ALL RESPONSES TO PHQ QUESTIONS 1-9: 0
SUM OF ALL RESPONSES TO PHQ9 QUESTIONS 1 AND 2: 0
5. POOR APPETITE OR OVEREATING: NOT AT ALL

## 2020-05-15 ASSESSMENT — FIBROSIS 4 INDEX
FIB4 SCORE: 2.54
FIB4 SCORE: 2.17

## 2020-05-15 ASSESSMENT — COPD QUESTIONNAIRES
DO YOU EVER COUGH UP ANY MUCUS OR PHLEGM?: YES, A FEW DAYS A WEEK OR MONTH
IN THE PAST 12 MONTHS DO YOU DO LESS THAN YOU USED TO BECAUSE OF YOUR BREATHING PROBLEMS: DISAGREE/UNSURE
DO YOU EVER COUGH UP ANY MUCUS OR PHLEGM?: YES, A FEW DAYS A WEEK OR MONTH
DURING THE PAST 4 WEEKS HOW MUCH DID YOU FEEL SHORT OF BREATH: SOME OF THE TIME
COPD SCREENING SCORE: 6
DURING THE PAST 4 WEEKS HOW MUCH DID YOU FEEL SHORT OF BREATH: SOME OF THE TIME
COPD SCREENING SCORE: 6
HAVE YOU SMOKED AT LEAST 100 CIGARETTES IN YOUR ENTIRE LIFE: YES
HAVE YOU SMOKED AT LEAST 100 CIGARETTES IN YOUR ENTIRE LIFE: YES

## 2020-05-15 ASSESSMENT — ENCOUNTER SYMPTOMS
WHEEZING: 1
COUGH: 0
CARDIOVASCULAR NEGATIVE: 1
HEMOPTYSIS: 0
NEUROLOGICAL NEGATIVE: 1
MUSCULOSKELETAL NEGATIVE: 1
GASTROINTESTINAL NEGATIVE: 1
PSYCHIATRIC NEGATIVE: 1
SHORTNESS OF BREATH: 1
EYES NEGATIVE: 1
CONSTITUTIONAL NEGATIVE: 1
SPUTUM PRODUCTION: 0

## 2020-05-15 NOTE — PROGRESS NOTES
Asked by Dr Golden to evaluate patient for admission. Patient with dyspnea hypotension (that resolved with 250cc bolus) and NSVT 6 beats assosciated with palpitations. Dr Golden consulting cardiology.  Dr Woodard will admit patient

## 2020-05-15 NOTE — ASSESSMENT & PLAN NOTE
Fall last week  images did not show bleeding or fracture  Monitor on telemetry; for possible arrhythmia  PT and OT for evaluation  Vitamin D within normal limits

## 2020-05-15 NOTE — ED NOTES
Pt Daughter Paula updated on POC per pt request.   Contact information phone number [phone].

## 2020-05-15 NOTE — ED PROVIDER NOTES
ED Provider Note    CHIEF COMPLAINT  Chief Complaint   Patient presents with   • Shortness of Breath     Pt BIBA c/o DESAI since waking, per EMS pt had a BP 73/46 upon standing , 250cc bolus 120/80 on arrival.    • Hypotension       HPI  Thalia Horton is a 89 y.o. female who presents because complaining of shortness of breath weakness and dizziness.  When EMS picked her up her blood pressure was low at 73/46.  She was given a 250 cc bolus by then and her blood pressure went up to 120.  She states that she feels palpitations in her chest and that she just cannot breathe.  She denies any fevers or chills or productive cough.  She denies any rashes or joint swelling.  She denies any stomach pain sweating nausea or vomiting.  Has a history of COPD and uses an inhaler.  She has a large bruise on her face from a fall 13 days ago.  She lives in assisted living.    REVIEW OF SYSTEMS    HEENT:  No ear pain, congestion or sore throat   EYES: no discharge redness or vision changes  CARDIAC: no chest pain,positive palpitations    PULMONARY: positive shortness of breath worse with exertion  GI: no vomiting diarrhea or abdominal pain   : no dysuria, back pain or hematuria   Neuro: no weakness, numbness aphasia or headache  Musculoskeletal: no swelling deformity or pain no joint swelling  Endocrine: no fevers, sweating, weight loss   SKIN: no rash, erythema or contusions     See history of present illness all other systems are negative    PAST MEDICAL HISTORY  Past Medical History:   Diagnosis Date   • COPD (chronic obstructive pulmonary disease) (HCC)    • GERD (gastroesophageal reflux disease)    • Heart palpitations    • Hyperlipidemia    • Osteoarthritis    • Thyroid disease        FAMILY HISTORY  Family History   Problem Relation Age of Onset   • Stroke Mother 69       SOCIAL HISTORY  Social History     Socioeconomic History   • Marital status:      Spouse name: Not on file   • Number of children: Not on file    • Years of education: Not on file   • Highest education level: Not on file   Occupational History   • Not on file   Social Needs   • Financial resource strain: Not on file   • Food insecurity     Worry: Not on file     Inability: Not on file   • Transportation needs     Medical: Not on file     Non-medical: Not on file   Tobacco Use   • Smoking status: Former Smoker     Packs/day: 1.00     Years: 55.00     Pack years: 55.00     Types: Cigarettes     Last attempt to quit: 2013     Years since quittin.8   • Smokeless tobacco: Never Used   Substance and Sexual Activity   • Alcohol use: Yes     Alcohol/week: 6.0 oz     Types: 10 Glasses of wine per week   • Drug use: No   • Sexual activity: Never   Lifestyle   • Physical activity     Days per week: Not on file     Minutes per session: Not on file   • Stress: Not on file   Relationships   • Social connections     Talks on phone: Not on file     Gets together: Not on file     Attends [employer] service: Not on file     Active member of club or organization: Not on file     Attends meetings of clubs or organizations: Not on file     Relationship status: Not on file   • Intimate partner violence     Fear of current or ex partner: Not on file     Emotionally abused: Not on file     Physically abused: Not on file     Forced sexual activity: Not on file   Other Topics Concern   • Not on file   Social History Narrative   • Not on file       SURGICAL HISTORY  Past Surgical History:   Procedure Laterality Date   • HIP REPLACEMENT, TOTAL Left        CURRENT MEDICATIONS  Home Medications     Reviewed by Sepideh Mcdonald (Pharmacy Tech) on 05/15/20 at 1326  Med List Status: Complete   Medication Last Dose Status   albuterol 108 (90 Base) MCG/ACT Aero Soln inhalation aerosol UNK Active   ASPIRIN 81 PO UNK Active   atorvastatin (LIPITOR) 20 MG Tab UNK Active   erythromycin 5 MG/GM Ointment UNK Active   escitalopram (LEXAPRO) 10 MG Tab UNK Active   ezetimibe (ZETIA) 10 MG  Tab UNK Active   levothyroxine (SYNTHROID) 25 MCG Tab UNK Active   metoprolol SR (TOPROL XL) 25 MG TABLET SR 24 HR UNK Active   Multiple Vitamins-Minerals (MULTIVITAMIN ADULT PO) UNK Active   omeprazole (PRILOSEC) 20 MG delayed-release capsule UNK Active                ALLERGIES  Allergies   Allergen Reactions   • Penicillins Vomiting       PHYSICAL EXAM  VITAL SIGNS: BP (!) 172/80   Pulse 89   Temp 36.8 °C (98.2 °F) (Temporal)   Resp 20   Wt 67.1 kg (148 lb)   LMP  (LMP Unknown)   SpO2 97%   BMI 25.40 kg/m²   Constitutional: Well developed, Well nourished, No acute distress, Non-toxic appearance.   HEENT: Normocephalic, discolored contusion on the patient's left forehead periorbital area and cheek external ears normal, pharynx pink,  Mucous  Membranes moist, No rhinorrhea or mucosal edema  Eyes: PERRL, EOMI, Conjunctiva normal, No discharge.   Neck: Normal range of motion, No tenderness, Supple, No stridor.   Lymphatic: No lymphadenopathy    Cardiovascular: Regular Rate and Rhythm, No murmurs,  rubs, or gallops.   Thorax & Lungs: scattered rhonchorous breath sounds bilaterally tachypnea  Abdomen: Bowel sounds normal, Soft, non tender, non distended,  No pulsatile masses., no rebound guarding or peritoneal signs.   Skin: Warm, Dry, No erythema, No rash,   Back:  No CVA tenderness,  No spinal tenderness, bony crepitance step offs or instability.   Extremities: Equal, intact distal pulses, No cyanosis, clubbing or edema,  No tenderness.   Musculoskeletal: Good range of motion in all major joints. No tenderness to palpation or major deformities noted.   Neurologic: Alert & oriented x 3, Cranial nerves II-XII intact, Equal strength and sensation upper and lower extremities bilaterally,  No focal deficits noted.   Psychiatric: Affect normal, Judgment normal, Mood normal.       RADIOLOGY/PROCEDURES  DX-CHEST-PORTABLE (1 VIEW)   Final Result      1.  Increased interstitial markings in the lung bases may be chronic  in nature from interstitial disease. Atypical infection/peribronchial inflammation could have a similar appearance            COURSE & MEDICAL DECISION MAKING  Pertinent Labs & Imaging studies reviewed. (See chart for details)  Differential diagnosis: COVID pneumonia, COPD exacerbation, anxiety, bronchitis, dehydration, cardiac ischemia    Results for orders placed or performed during the hospital encounter of 05/15/20   CBC with Differential   Result Value Ref Range    WBC 6.6 4.8 - 10.8 K/uL    RBC 4.11 (L) 4.20 - 5.40 M/uL    Hemoglobin 13.4 12.0 - 16.0 g/dL    Hematocrit 40.7 37.0 - 47.0 %    MCV 99.0 (H) 81.4 - 97.8 fL    MCH 32.6 27.0 - 33.0 pg    MCHC 32.9 (L) 33.6 - 35.0 g/dL    RDW 46.8 35.9 - 50.0 fL    Platelet Count 217 164 - 446 K/uL    MPV 8.9 (L) 9.0 - 12.9 fL    Neutrophils-Polys 56.80 44.00 - 72.00 %    Lymphocytes 31.50 22.00 - 41.00 %    Monocytes 9.90 0.00 - 13.40 %    Eosinophils 0.90 0.00 - 6.90 %    Basophils 0.60 0.00 - 1.80 %    Immature Granulocytes 0.30 0.00 - 0.90 %    Nucleated RBC 0.00 /100 WBC    Neutrophils (Absolute) 3.73 2.00 - 7.15 K/uL    Lymphs (Absolute) 2.07 1.00 - 4.80 K/uL    Monos (Absolute) 0.65 0.00 - 0.85 K/uL    Eos (Absolute) 0.06 0.00 - 0.51 K/uL    Baso (Absolute) 0.04 0.00 - 0.12 K/uL    Immature Granulocytes (abs) 0.02 0.00 - 0.11 K/uL    NRBC (Absolute) 0.00 K/uL   Complete Metabolic Panel (CMP)   Result Value Ref Range    Sodium 137 135 - 145 mmol/L    Potassium 3.6 3.6 - 5.5 mmol/L    Chloride 98 96 - 112 mmol/L    Co2 25 20 - 33 mmol/L    Anion Gap 14.0 7.0 - 16.0    Glucose 158 (H) 65 - 99 mg/dL    Bun 14 8 - 22 mg/dL    Creatinine 0.70 0.50 - 1.40 mg/dL    Calcium 9.9 8.5 - 10.5 mg/dL    AST(SGOT) 24 12 - 45 U/L    ALT(SGPT) 15 2 - 50 U/L    Alkaline Phosphatase 66 30 - 99 U/L    Total Bilirubin 0.5 0.1 - 1.5 mg/dL    Albumin 3.9 3.2 - 4.9 g/dL    Total Protein 6.9 6.0 - 8.2 g/dL    Globulin 3.0 1.9 - 3.5 g/dL    A-G Ratio 1.3 g/dL   Troponin STAT   Result  Value Ref Range    Troponin T <6 6 - 19 ng/L   Phosphorus   Result Value Ref Range    Phosphorus 2.9 2.5 - 4.5 mg/dL   D-Dimer   Result Value Ref Range    D-Dimer Screen 1.74 (H) 0.00 - 0.50 ug/mL (FEU)   COVID/SARS CoV-2    Specimen: Nasopharyngeal; Respirate   Result Value Ref Range    COVID Order Status Received    SARS-CoV-2, PCR (In-House)   Result Value Ref Range    SARS-CoV-2 Source NP Swab     SARS-CoV-2 by PCR NotDetected NotDetected   ESTIMATED GFR   Result Value Ref Range    GFR If African American >60 >60 mL/min/1.73 m 2    GFR If Non African American >60 >60 mL/min/1.73 m 2   EKG   Result Value Ref Range    Report       [facility] Emergency Dept.    Test Date:  2020-05-15  Pt Name:    ANAY HARDIN              Department: ER  MRN:        [id number]                      Room:       [facility]  Gender:     Female                       Technician: [id number]  :        [id number]                   Requested By:ER TRIAGE PROTOCOL  Order #:    [id number]                    Reading MD: JESSE ALANIZ MD    Measurements  Intervals                                Axis  Rate:       92                           P:          84  WY:         148                          QRS:        -42  QRSD:       82                           T:          108  QT:         376  QTc:        466    Interpretive Statements  SINUS RHYTHM  LEFT ATRIAL ABNORMALITY  LAD, CONSIDER LAFB OR INFERIOR INFARCT  PROBABLE ANTEROSEPTAL INFARCT, OLD  BORDERLINE T WAVE ABNORMALITIES  LATERAL LEADS ARE ALSO INVOLVED  Compared to ECG 2019 10:32:15  T-wave abnormality now present  Left-axis deviation no longer present  Myocardial infarct  finding still present  Electronically Signed On 5- 13:01:22 PDT by JESSE ALANIZ MD        Patient was placed on oxygen by nasal cannula for her comfort.  Pressure is improved and her oxygen is 96%.      I went to discharge the patient and she had a 6 beat run of ventricular tachycardia.  She said  she did feel the palpitations.    1:15 PM  AM I spoke with the hospitalist who has accepted the patient for admission.    1:15 PM with cardiology who will consult on the patient's case.    1:32 PM I Spoke with Dr. Brooke cardiology who states he will consult as needed.     Critical Care  Due to the real possibility of a deterioration of this patient's condition required the highest level of my preparedness for sudden emergent intervention. I provided critical care services which included medication orders, frequent reevaluations of the patient's condition and response to treatment, ordering and reviewing test results and discussing the case with various consultants. The critical care time associated with the care of the patient was 40 minutes. Review chart for interventions. This time is exclusive of any other billable procedures.     FINAL IMPRESSION  1. Hypotension, unspecified hypotension type    2. Dyspnea on exertion    3. V-tach ([facility])           PLAN/DISPOSITION  Admitted          Electronically signed by: Anaya Golden M.D., 5/15/2020 11:46 AM

## 2020-05-15 NOTE — PROGRESS NOTES
2 RN skin check complete w/ Mamta MURRAY RN.    Devices in place none.  Skin assessed under devices n/a.  Pt has bruise and lump on left side of face/ forehead from previous fall.   Skin clean, dry and intact throughout.

## 2020-05-15 NOTE — ED NOTES
Assist RN to:  Pt transport now to room upstairs, concerns addressed to receiving floor nurse. Pt in NAD at time of transfer. Vital signs stable. Belongings with pt.

## 2020-05-15 NOTE — PROGRESS NOTES
Pt received from [place] [id number]. Tele monitor in place. VSS. Pt oriented to room. Educated on use of the call light. Pt demonstrated use of the call light. Discussed POC. All questions answered.      Patient was transported from the ED to [facility],2 by ACLS nurse once orders were put in for the patient. Charge RN is aware of situation.

## 2020-05-15 NOTE — ED TRIAGE NOTES
Chief Complaint   Patient presents with   • Shortness of Breath     Pt BIBA c/o DESAI since waking, per EMS pt had a BP 73/46 upon standing , 250cc bolus 120/80 on arrival.    • Hypotension

## 2020-05-15 NOTE — H&P
Hospital Medicine History & Physical Note    Date of Service  5/15/2020    Primary Care Physician  Marcelo Isaacs, ASIM.P.RvYetteN.    Code Status  Full Code    Chief Complaint  Chief Complaint   Patient presents with   • Shortness of Breath     Pt BIBA c/o DESAI since waking, per EMS pt had a BP 73/46 upon standing , 250cc bolus 120/80 on arrival.    • Hypotension       History of Presenting Illness    89-year-old female with history of COPD, GERD, hyperlipidemia and thyroid disease presented 5/15 with shortness of breath, the patient lives in a skilled nursing place and today morning she noticed wheezing and some shortness of breath, she tried to use inhalers but no improvement, denied fever or chills no significant coughing or sputum and denied chest pain, a week ago the patient fell on her face and CT scan did not show intracranial bleeding, she states she was on her way to the bathroom when she fell and hit her head, denied any dizziness or syncope however she has some palpitation sometimes, on admission labs did not show leukocytosis or infection, COVID-19 was negative, EKG showed normal sinus without acute ischemic troponin was negative, however telemetry noticed around 7 beat V. Tach, the patient will be admitted to the hospital for monitoring her heart and shortness of breath and assess her with PT and OT for her fall.     Review of Systems  Review of Systems   Constitutional: Negative.    HENT: Positive for hearing loss. Negative for ear pain and tinnitus.    Eyes: Negative.    Respiratory: Positive for shortness of breath and wheezing. Negative for cough, hemoptysis and sputum production.    Cardiovascular: Negative.    Gastrointestinal: Negative.    Genitourinary: Negative.    Musculoskeletal: Negative.    Skin: Negative.    Neurological: Negative.    Psychiatric/Behavioral: Negative.  Suicidal ideas:        Past Medical History   has a past medical history of COPD (chronic obstructive pulmonary disease)  (HCC), GERD (gastroesophageal reflux disease), Heart palpitations, Hyperlipidemia, Osteoarthritis, and Thyroid disease.    Surgical History   has a past surgical history that includes hip replacement, total (Left).     Family History  family history includes Stroke (age of onset: 69) in her mother.     Social History   reports that she quit smoking about 6 years ago. Her smoking use included cigarettes. She has a 55.00 pack-year smoking history. She has never used smokeless tobacco. She reports current alcohol use of about 6.0 oz of alcohol per week. She reports that she does not use drugs.    Allergies  Allergies   Allergen Reactions   • Penicillins Vomiting       Medications  Prior to Admission Medications   Prescriptions Last Dose Informant Patient Reported? Taking?   ASPIRIN 81 PO UNK at [facility] Patient's Home Pharmacy Yes No   Sig: Take 81 mg by mouth every day.   Multiple Vitamins-Minerals (MULTIVITAMIN ADULT PO) UNK at [facility] Patient's Home Pharmacy Yes No   Sig: Take 1 Tab by mouth every day. Indications: Centrum   albuterol 108 (90 Base) MCG/ACT Aero Soln inhalation aerosol UNK at [facility] Patient's Home Pharmacy No No   Sig: INHALE 2 PUFFS BY MOUTH EVERY 6 HOURS AS NEEDED FOR SHORTNESS OF BREATH   atorvastatin (LIPITOR) 20 MG Tab UNK at [facility] Patient's Home Pharmacy No No   Sig: Take 1 Tab by mouth every day. FOR CHOLESTEROL.   erythromycin 5 MG/GM Ointment UNK at [facility] Patient's Home Pharmacy Yes No   Sig: NEETU THIN LAYER IN OD QHS   escitalopram (LEXAPRO) 10 MG Tab UNK at [facility] Patient's Home Pharmacy No No   Sig: Take 1 Tab by mouth every bedtime. FOR DEPRESSION.   ezetimibe (ZETIA) 10 MG Tab UNK at [facility] Patient's Home Pharmacy No No   Sig: Take 1 Tab by mouth every day. FOR CHOLESTEROL/STROKE PREVENTION   levothyroxine (SYNTHROID) 25 MCG Tab UNK at [facility] Patient's Home Pharmacy No No   Sig: Take 1 Tab by mouth Every morning on an empty stomach. FOR THYROID.   metoprolol SR (TOPROL XL) 25 MG TABLET SR 24 HR UNK at [facility]  Patient's Home Pharmacy No No   Sig: TAKE 1/2 TABLET BY MOUTH EVERY DAY   omeprazole (PRILOSEC) 20 MG delayed-release capsule UNK at [facility] Patient's Home Pharmacy No No   Sig: TAKE 1 CAPSULE BY MOUTH EVERY DAY      Facility-Administered Medications: None       Physical Exam  Temp:  [36.8 °C (98.2 °F)] 36.8 °C (98.2 °F)  Pulse:  [89-98] 93  Resp:  [20-36] 36  BP: (122-172)/() 152/114  SpO2:  [96 %-99 %] 98 %    Physical Exam  Constitutional:       General: She is not in acute distress.     Appearance: Normal appearance. She is not ill-appearing.   HENT:      Head: Normocephalic and atraumatic.   Eyes:      General: No scleral icterus.  Neck:      Musculoskeletal: No neck rigidity.   Cardiovascular:      Rate and Rhythm: Normal rate.      Heart sounds: No murmur.   Pulmonary:      Effort: Pulmonary effort is normal. No respiratory distress.      Breath sounds: No wheezing or rales.   Abdominal:      General: Abdomen is flat. Bowel sounds are normal. There is no distension.      Tenderness: There is no abdominal tenderness. There is no guarding.   Musculoskeletal: Normal range of motion.         General: No swelling, tenderness or deformity.      Right lower leg: No edema.   Skin:     General: Skin is warm.      Coloration: Skin is not jaundiced.      Findings: No bruising or lesion.   Neurological:      General: No focal deficit present.      Mental Status: She is alert and oriented to person, place, and time. Mental status is at baseline.      Cranial Nerves: No cranial nerve deficit.      Motor: No weakness.      Gait: Gait normal.   Psychiatric:         Mood and Affect: Mood normal.      Comments: Very talkative         Laboratory:  Recent Labs     05/15/20  1124   WBC 6.6   RBC 4.11*   HEMOGLOBIN 13.4   HEMATOCRIT 40.7   MCV 99.0*   MCH 32.6   MCHC 32.9*   RDW 46.8   PLATELETCT 217   MPV 8.9*     Recent Labs     05/15/20  1124   SODIUM 137   POTASSIUM 3.6   CHLORIDE 98   CO2 25   GLUCOSE 158*   BUN 14    CREATININE 0.70   CALCIUM 9.9     Recent Labs     05/15/20  1124   ALTSGPT 15   ASTSGOT 24   ALKPHOSPHAT 66   TBILIRUBIN 0.5   GLUCOSE 158*         No results for input(s): NTPROBNP in the last 72 hours.      Recent Labs     05/15/20  1124   TROPONINT <6       Imaging:  DX-CHEST-PORTABLE (1 VIEW)   Final Result      1.  Increased interstitial markings in the lung bases may be chronic in nature from interstitial disease. Atypical infection/peribronchial inflammation could have a similar appearance      EC-ECHOCARDIOGRAM COMPLETE W/O CONT    (Results Pending)         Assessment/Plan:    * COPD (chronic obstructive pulmonary disease) ([facility])  Assessment & Plan  Came with shortness of breath and wheezing  However she has improved with inhalers and she was on room air  No significant wheezing on physical exam  Continue albuterol as needed with [place]ri   No need for steroid or antibiotics  Consider PFT as outpatient    V-tach ([facility])- (present on admission)  Assessment & Plan  Noticed 6 beats nonsustained V. tach in the ER, patient felt palpitation but no syncope  EKG no acute ischemia or changes, and troponin is negative  Dr. Brooke cardiologist reviewed her chart and consider echo and Zio-patch for monitoring as on discharge  Continue aspirin with atorvastatin and metoprolol  Echo was ordered  Telemetry monitoring    Fall- (present on admission)  Assessment & Plan  Fall last week  images did not show bleeding or fracture  Monitor on telemetry; for possible arrhythmia  PT and OT for evaluation  Check vitamin D    Essential hypertension- (present on admission)  Assessment & Plan  Continue metoprolol home dose  Monitor closely    Gastroesophageal reflux disease without esophagitis- (present on admission)  Assessment & Plan  Continue omeprazole    Mixed hyperlipidemia- (present on admission)  Assessment & Plan  Continue atorvastatin 20 mg daily  And aspirin    Acquired hypothyroidism- (present on admission)  Assessment &  Plan  Continue her home medication 25 mcg levothyroxine  Check TSH tomorrow    DVT prophylaxis Lovenox

## 2020-05-15 NOTE — ED NOTES
Med rec completed per pt's home pharmacy   Allergies reviewed  Per Quinnier pt lives in independent living and handles her own daily medications

## 2020-05-15 NOTE — ASSESSMENT & PLAN NOTE
Came with shortness of breath and wheezing  However she has improved with inhalers and she was on room air  No significant wheezing on physical exam  Continue monitoring with RT protocol

## 2020-05-15 NOTE — PROGRESS NOTES
Brief Cardiology Note:    I was called to discuss this patients care with Dr. Golden. We discussed admission overnight for observation due to 6 beats of NSVT noticed in the ER. EKG reviewed and showed poor r wave progression, no other significant abnormalities. I think it is reasonable to monitor overnight and check an echocardiogram. DC tomorrow if no significant abnormalities with cardiology f/u. Could consider DC with a 2 week ziopatch order.    At this time it was deemed no formal in person cardiology consultation was necessary, however if this changes due to changes in patient condition or abnormal test results, please let me know directly via tigertext and I'll be happy to assess her in person.     Electronically Signed by:  Hector Brooke MD, FACC  5/15/2020  1:32 PM

## 2020-05-15 NOTE — CARE PLAN
Problem: Communication  Goal: The ability to communicate needs accurately and effectively will improve  Outcome: PROGRESSING AS EXPECTED  Patient communicates needs appropriately     Problem: Safety  Goal: Will remain free from injury  Outcome: PROGRESSING AS EXPECTED  Patient verbalizes understanding of fall risk interventions. Bed alarm is on, bed is locked and in low position. Room is well lit and free of clutter

## 2020-05-15 NOTE — ASSESSMENT & PLAN NOTE
Noticed 6 beats nonsustained V. tach in the ER, patient felt palpitation but no syncope  EKG no acute ischemia or changes, and troponin is negative  Dr. Brooke cardiologist reviewed her chart and consider echo and Zio-patch for monitoring as on discharge  Continue aspirin with atorvastatin and metoprolol  Echo : Moderate MR.  Telemetry monitoring: So far no recurrence of VT

## 2020-05-16 ENCOUNTER — APPOINTMENT (OUTPATIENT)
Dept: RADIOLOGY | Facility: MEDICAL CENTER | Age: 85
End: 2020-05-16
Attending: INTERNAL MEDICINE
Payer: MEDICARE

## 2020-05-16 VITALS
RESPIRATION RATE: 16 BRPM | DIASTOLIC BLOOD PRESSURE: 76 MMHG | OXYGEN SATURATION: 92 % | SYSTOLIC BLOOD PRESSURE: 126 MMHG | TEMPERATURE: 97.1 F | BODY MASS INDEX: 24.16 KG/M2 | HEART RATE: 76 BPM | HEIGHT: 64 IN | WEIGHT: 141.54 LBS

## 2020-05-16 PROBLEM — Z71.89 ADVANCE CARE PLANNING: Status: ACTIVE | Noted: 2020-05-16

## 2020-05-16 LAB
25(OH)D3 SERPL-MCNC: 37 NG/ML (ref 30–100)
ANION GAP SERPL CALC-SCNC: 12 MMOL/L (ref 7–16)
BASOPHILS # BLD AUTO: 0.7 % (ref 0–1.8)
BASOPHILS # BLD: 0.04 K/UL (ref 0–0.12)
BUN SERPL-MCNC: 17 MG/DL (ref 8–22)
CALCIUM SERPL-MCNC: 9.4 MG/DL (ref 8.5–10.5)
CHLORIDE SERPL-SCNC: 102 MMOL/L (ref 96–112)
CO2 SERPL-SCNC: 26 MMOL/L (ref 20–33)
CREAT SERPL-MCNC: 0.52 MG/DL (ref 0.5–1.4)
EOSINOPHIL # BLD AUTO: 0.09 K/UL (ref 0–0.51)
EOSINOPHIL NFR BLD: 1.5 % (ref 0–6.9)
ERYTHROCYTE [DISTWIDTH] IN BLOOD BY AUTOMATED COUNT: 45.8 FL (ref 35.9–50)
EST. AVERAGE GLUCOSE BLD GHB EST-MCNC: 117 MG/DL
GLUCOSE SERPL-MCNC: 116 MG/DL (ref 65–99)
HBA1C MFR BLD: 5.7 % (ref 0–5.6)
HCT VFR BLD AUTO: 39.8 % (ref 37–47)
HGB BLD-MCNC: 13.2 G/DL (ref 12–16)
IMM GRANULOCYTES # BLD AUTO: 0.02 K/UL (ref 0–0.11)
IMM GRANULOCYTES NFR BLD AUTO: 0.3 % (ref 0–0.9)
LV EJECT FRACT  99904: 60
LV EJECT FRACT MOD 2C 99903: 52.98
LV EJECT FRACT MOD 4C 99902: 63.01
LV EJECT FRACT MOD BP 99901: 57.75
LYMPHOCYTES # BLD AUTO: 1.97 K/UL (ref 1–4.8)
LYMPHOCYTES NFR BLD: 33.6 % (ref 22–41)
MAGNESIUM SERPL-MCNC: 2.6 MG/DL (ref 1.5–2.5)
MCH RBC QN AUTO: 32.7 PG (ref 27–33)
MCHC RBC AUTO-ENTMCNC: 33.2 G/DL (ref 33.6–35)
MCV RBC AUTO: 98.5 FL (ref 81.4–97.8)
MONOCYTES # BLD AUTO: 0.88 K/UL (ref 0–0.85)
MONOCYTES NFR BLD AUTO: 15 % (ref 0–13.4)
NEUTROPHILS # BLD AUTO: 2.87 K/UL (ref 2–7.15)
NEUTROPHILS NFR BLD: 48.9 % (ref 44–72)
NRBC # BLD AUTO: 0 K/UL
NRBC BLD-RTO: 0 /100 WBC
PLATELET # BLD AUTO: 208 K/UL (ref 164–446)
PMV BLD AUTO: 8.6 FL (ref 9–12.9)
POTASSIUM SERPL-SCNC: 3.7 MMOL/L (ref 3.6–5.5)
RBC # BLD AUTO: 4.04 M/UL (ref 4.2–5.4)
SODIUM SERPL-SCNC: 140 MMOL/L (ref 135–145)
TSH SERPL DL<=0.005 MIU/L-ACNC: 3.84 UIU/ML (ref 0.38–5.33)
WBC # BLD AUTO: 5.9 K/UL (ref 4.8–10.8)

## 2020-05-16 PROCEDURE — 83735 ASSAY OF MAGNESIUM: CPT

## 2020-05-16 PROCEDURE — A9270 NON-COVERED ITEM OR SERVICE: HCPCS | Performed by: INTERNAL MEDICINE

## 2020-05-16 PROCEDURE — 36415 COLL VENOUS BLD VENIPUNCTURE: CPT

## 2020-05-16 PROCEDURE — 82306 VITAMIN D 25 HYDROXY: CPT

## 2020-05-16 PROCEDURE — 700102 HCHG RX REV CODE 250 W/ 637 OVERRIDE(OP): Performed by: INTERNAL MEDICINE

## 2020-05-16 PROCEDURE — 700117 HCHG RX CONTRAST REV CODE 255: Performed by: INTERNAL MEDICINE

## 2020-05-16 PROCEDURE — 85025 COMPLETE CBC W/AUTO DIFF WBC: CPT

## 2020-05-16 PROCEDURE — 84443 ASSAY THYROID STIM HORMONE: CPT

## 2020-05-16 PROCEDURE — 97535 SELF CARE MNGMENT TRAINING: CPT

## 2020-05-16 PROCEDURE — 99217 PR OBSERVATION CARE DISCHARGE: CPT | Performed by: INTERNAL MEDICINE

## 2020-05-16 PROCEDURE — 96372 THER/PROPH/DIAG INJ SC/IM: CPT | Mod: XU

## 2020-05-16 PROCEDURE — 97162 PT EVAL MOD COMPLEX 30 MIN: CPT

## 2020-05-16 PROCEDURE — 80048 BASIC METABOLIC PNL TOTAL CA: CPT

## 2020-05-16 PROCEDURE — 99497 ADVNCD CARE PLAN 30 MIN: CPT | Performed by: INTERNAL MEDICINE

## 2020-05-16 PROCEDURE — 71275 CT ANGIOGRAPHY CHEST: CPT

## 2020-05-16 PROCEDURE — 700111 HCHG RX REV CODE 636 W/ 250 OVERRIDE (IP): Performed by: INTERNAL MEDICINE

## 2020-05-16 PROCEDURE — 83036 HEMOGLOBIN GLYCOSYLATED A1C: CPT

## 2020-05-16 PROCEDURE — G0378 HOSPITAL OBSERVATION PER HR: HCPCS

## 2020-05-16 RX ADMIN — OMEPRAZOLE 20 MG: 20 CAPSULE, DELAYED RELEASE ORAL at 05:46

## 2020-05-16 RX ADMIN — IOHEXOL 54 ML: 350 INJECTION, SOLUTION INTRAVENOUS at 12:47

## 2020-05-16 RX ADMIN — LEVOTHYROXINE SODIUM 25 MCG: 25 TABLET ORAL at 05:56

## 2020-05-16 RX ADMIN — ATORVASTATIN CALCIUM 20 MG: 20 TABLET, FILM COATED ORAL at 05:45

## 2020-05-16 RX ADMIN — METOPROLOL SUCCINATE 12.5 MG: 25 TABLET, EXTENDED RELEASE ORAL at 06:02

## 2020-05-16 RX ADMIN — EZETIMIBE 10 MG: 10 TABLET ORAL at 05:44

## 2020-05-16 RX ADMIN — ACETAMINOPHEN 650 MG: 325 TABLET, FILM COATED ORAL at 09:19

## 2020-05-16 RX ADMIN — ENOXAPARIN SODIUM 40 MG: 100 INJECTION SUBCUTANEOUS at 06:02

## 2020-05-16 RX ADMIN — ASPIRIN 81 MG 81 MG: 81 TABLET ORAL at 05:45

## 2020-05-16 ASSESSMENT — ENCOUNTER SYMPTOMS
FEVER: 0
NAUSEA: 0
FALLS: 1
BRUISES/BLEEDS EASILY: 0
NECK PAIN: 0
PALPITATIONS: 1
COUGH: 0
DOUBLE VISION: 0
SPEECH CHANGE: 0
CHILLS: 0
BACK PAIN: 0
WEIGHT LOSS: 0
FOCAL WEAKNESS: 0
POLYDIPSIA: 0
VOMITING: 0
FLANK PAIN: 0
ORTHOPNEA: 0
HEARTBURN: 0
HALLUCINATIONS: 0
PHOTOPHOBIA: 0
SHORTNESS OF BREATH: 1
HEMOPTYSIS: 0
TREMORS: 0
BLURRED VISION: 0
NERVOUS/ANXIOUS: 0
SPUTUM PRODUCTION: 0
HEADACHES: 0

## 2020-05-16 ASSESSMENT — COGNITIVE AND FUNCTIONAL STATUS - GENERAL
MOBILITY SCORE: 23
CLIMB 3 TO 5 STEPS WITH RAILING: A LITTLE
SUGGESTED CMS G CODE MODIFIER MOBILITY: CI

## 2020-05-16 ASSESSMENT — GAIT ASSESSMENTS
ASSISTIVE DEVICE: 4 WHEEL WALKER
DISTANCE (FEET): 300
GAIT LEVEL OF ASSIST: SUPERVISED
DEVIATION: DECREASED BASE OF SUPPORT

## 2020-05-16 ASSESSMENT — LIFESTYLE VARIABLES: SUBSTANCE_ABUSE: 0

## 2020-05-16 NOTE — PROGRESS NOTES
Assumed care of patient, bedside report received from VISHNU Kat. Updated on POC, call light within reach and fall precautions in place. Bed locked and in lowest position. Patient instructed to call for assistance before getting out of bed. All questions answered, no other needs at this time.

## 2020-05-16 NOTE — ASSESSMENT & PLAN NOTE
I discussed advance care planning with the patient's family for at least 27 minutes, including diagnosis, prognosis, plan of care, risks and benefits of any therapies that could be offered, as well as alternatives including palliation and hospice, as appropriate. Patient has been made a DNR now. BILL CODE 03628.

## 2020-05-16 NOTE — FACE TO FACE
Face to Face Supporting Documentation - Home Health    The encounter with this patient was in whole or in part the primary reason for home health admission.    Date of encounter:   Patient:                    MRN:                       YOB: 2020  Thalia Horton  [id number]  2/8/1931     Home health to see patient for:  Skilled Nursing care for assessment, interventions & education, Physical Therapy evaluation and treatment and Occupational therapy evaluation and treatment    Skilled need for:  Recent Deterioration of Health Status fall, COPD    Skilled nursing interventions to include:  Comment: Assesment and education. PT/OT    Homebound status evidenced by:  Needs the assistance of another person in order to leave the home. Leaving home requires a considerable and taxing effort. There is a normal inability to leave the home.    Community Physician to provide follow up care: Marcelo Isaacs, ASIM.P.R.N.     Optional Interventions? No      I certify the face to face encounter for this home health care referral meets the CMS requirements and the encounter/clinical assessment with the patient was, in whole, or in part, for the medical condition(s) listed above, which is the primary reason for home health care. Based on my clinical findings: the service(s) are medically necessary, support the need for home health care, and the homebound criteria are met.  I certify that this patient has had a face to face encounter by myself.  Honorio Tariq M.D. - NPI: [id number]

## 2020-05-16 NOTE — PROGRESS NOTES
Hospital Medicine Daily Progress Note    Date of Service  5/16/2020    Chief Complaint/Hospital Course  89 y.o. female with history of COPD, hypertension, GERD, admitted 5/15/2020 with SOB and hypotension        Interval Problem Update  Patient denies shortness of breath today.  Blood pressure is stable.  ETT evaluation pending.  Order CT of pulmonary artery to rule out PE given elevated d-dimer and presentation..  Telemetry reviewed: Sinus rhythm 90s with rare PVC, no paroxysms of VT  Consultants/Specialty  Cardiology    Code Status  DNR/DNI    Disposition  Home when medically cleared.  Ordered PT OT and home health at home.    Review of Systems  Review of Systems   Constitutional: Negative for chills, fever and weight loss.   HENT: Negative for ear pain, hearing loss and tinnitus.    Eyes: Negative for blurred vision, double vision and photophobia.   Respiratory: Positive for shortness of breath. Negative for cough, hemoptysis and sputum production.    Cardiovascular: Positive for palpitations. Negative for chest pain and orthopnea.   Gastrointestinal: Negative for heartburn, nausea and vomiting.   Genitourinary: Negative for dysuria, flank pain, frequency and hematuria.   Musculoskeletal: Positive for falls and joint pain. Negative for back pain and neck pain.   Skin: Negative for itching and rash.   Neurological: Negative for tremors, speech change, focal weakness and headaches.   Endo/Heme/Allergies: Negative for environmental allergies and polydipsia. Does not bruise/bleed easily.   Psychiatric/Behavioral: Negative for hallucinations and substance abuse. The patient is not nervous/anxious.         Physical Exam  Temp:  [35.8 °C (96.5 °F)-36.3 °C (97.4 °F)] 35.9 °C (96.6 °F)  Pulse:  [] 70  Resp:  [16-36] 16  BP: (105-176)/() 105/66  SpO2:  [91 %-99 %] 92 %    Physical Exam  Vitals signs and nursing note reviewed.   Constitutional:       General: She is not in acute distress.     Appearance: Normal  appearance.   HENT:      Head: Normocephalic.      Comments: Large hematoma on the face and forehead     Nose: Nose normal.      Mouth/Throat:      Mouth: Mucous membranes are moist.   Eyes:      Extraocular Movements: Extraocular movements intact.      Pupils: Pupils are equal, round, and reactive to light.   Neck:      Musculoskeletal: Normal range of motion and neck supple.   Cardiovascular:      Rate and Rhythm: Normal rate and regular rhythm.   Pulmonary:      Effort: Pulmonary effort is normal.      Breath sounds: Normal breath sounds.   Abdominal:      General: Abdomen is flat. There is no distension.      Tenderness: There is no abdominal tenderness.   Musculoskeletal: Normal range of motion.         General: No swelling or deformity.   Skin:     General: Skin is warm and dry.   Neurological:      General: No focal deficit present.      Mental Status: She is alert and oriented to person, place, and time.   Psychiatric:         Mood and Affect: Mood normal.         Behavior: Behavior normal.         Fluids    Intake/Output Summary (Last 24 hours) at 5/16/2020 1127  Last data filed at 5/16/2020 0900  Gross per 24 hour   Intake 460 ml   Output --   Net 460 ml       Laboratory  Recent Labs     05/15/20  1124 05/16/20  0244   WBC 6.6 5.9   RBC 4.11* 4.04*   HEMOGLOBIN 13.4 13.2   HEMATOCRIT 40.7 39.8   MCV 99.0* 98.5*   MCH 32.6 32.7   MCHC 32.9* 33.2*   RDW 46.8 45.8   PLATELETCT 217 208   MPV 8.9* 8.6*     Recent Labs     05/15/20  1124 05/16/20  0244   SODIUM 137 140   POTASSIUM 3.6 3.7   CHLORIDE 98 102   CO2 25 26   GLUCOSE 158* 116*   BUN 14 17   CREATININE 0.70 0.52   CALCIUM 9.9 9.4                   Imaging  EC-ECHOCARDIOGRAM COMPLETE W/O CONT   Final Result      DX-CHEST-PORTABLE (1 VIEW)   Final Result      1.  Increased interstitial markings in the lung bases may be chronic in nature from interstitial disease. Atypical infection/peribronchial inflammation could have a similar appearance      CT-CTA  CHEST PULMONARY ARTERY W/ RECONS    (Results Pending)        Assessment/Plan  * COPD (chronic obstructive pulmonary disease) ([facility])  Assessment & Plan  Came with shortness of breath and wheezing  However she has improved with inhalers and she was on room air  No significant wheezing on physical exam  Continue monitoring with RT protocol    V-tach ([facility])- (present on admission)  Assessment & Plan  Noticed 6 beats nonsustained V. tach in the ER, patient felt palpitation but no syncope  EKG no acute ischemia or changes, and troponin is negative  Dr. Brooke cardiologist reviewed her chart and consider echo and Zio-patch for monitoring as on discharge  Continue aspirin with atorvastatin and metoprolol  Echo : Moderate MR.  Telemetry monitoring: So far no recurrence of VT    Fall- (present on admission)  Assessment & Plan  Fall last week  images did not show bleeding or fracture  Monitor on telemetry; for possible arrhythmia  PT and OT for evaluation  Vitamin D within normal limits      Advance care planning  Assessment & Plan  I discussed advance care planning with the patient's family for at least 27 minutes, including diagnosis, prognosis, plan of care, risks and benefits of any therapies that could be offered, as well as alternatives including palliation and hospice, as appropriate. Patient has been made a DNR now. BILL CODE 16182.      Essential hypertension- (present on admission)  Assessment & Plan  Continue metoprolol home dose  Monitor closely  Pressure has been stable    Gastroesophageal reflux disease without esophagitis- (present on admission)  Assessment & Plan  Continue omeprazole    Mixed hyperlipidemia- (present on admission)  Assessment & Plan  Continue atorvastatin 20 mg daily  And aspirin    Acquired hypothyroidism- (present on admission)  Assessment & Plan  Continue her home medication 25 mcg levothyroxine  TSH 0.384       VTE prophylaxis: Heparin

## 2020-05-16 NOTE — RESPIRATORY CARE
COPD EDUCATION by COPD CLINICAL EDUCATOR  5/16/2020  at  10:56 AM by Lorin Thomas, RRT     Patient interviewed by COPD education team.  Patient unable to participate in full program.  Short intervention has been conducted.  A comprehensive packet including information about COPD, treatments, and smoking cessation given.

## 2020-05-16 NOTE — DISCHARGE PLANNING
Anticipated Discharge Disposition: home with home health    Action: per BSRN, MD gave permission to discharge pt home without home health. Son on his way to  pt. Spoke with son Freddie [phone]. Son thinks pt safe to return to [facility]. Son states pt has PT/OT that will come and work with pt but agreeable to getting extra help with home health. Choice obtained from son ([facility]) and faxed to [facility]    Barriers to Discharge: none    Plan: return with son to [facility]. Pending [facility] acceptance     19

## 2020-05-16 NOTE — DISCHARGE INSTRUCTIONS
Discharge Instructions    Discharged to home by car with relative. Discharged via wheelchair, hospital escort: Yes.  Special equipment needed: Not Applicable    Be sure to schedule a follow-up appointment with your primary care doctor or any specialists as instructed.     Discharge Plan:   Diet Plan: Discussed  Activity Level: Discussed  Confirmed Follow up Appointment: Patient to Call and Schedule Appointment  Confirmed Symptoms Management: Discussed  Medication Reconciliation Updated: Yes    I understand that a diet low in cholesterol, fat, and sodium is recommended for good health. Unless I have been given specific instructions below for another diet, I accept this instruction as my diet prescription.       Special Instructions: None    · Is patient discharged on Warfarin / Coumadin?   No     Depression / Suicide Risk    As you are discharged from this [facility] facility, it is important to learn how to keep safe from harming yourself.    Recognize the warning signs:  · Abrupt changes in personality, positive or negative- including increase in energy   · Giving away possessions  · Change in eating patterns- significant weight changes-  positive or negative  · Change in sleeping patterns- unable to sleep or sleeping all the time   · Unwillingness or inability to communicate  · Depression  · Unusual sadness, discouragement and loneliness  · Talk of wanting to die  · Neglect of personal appearance   · Rebelliousness- reckless behavior  · Withdrawal from people/activities they love  · Confusion- inability to concentrate     If you or a loved one observes any of these behaviors or has concerns about self-harm, here's what you can do:  · Talk about it- your feelings and reasons for harming yourself  · Remove any means that you might use to hurt yourself (examples: pills, rope, extension cords, firearm)  · Get professional help from the community (Mental Health, Substance Abuse, psychological counseling)  · Do not  be alone:Call your Safe Contact- someone whom you trust who will be there for you.  · Call your local CRISIS HOTLINE 990-8466 or 940-866-3879  · Call your local Children's Mobile Crisis Response Team [place] (916) 881-1697 or www.[employer]  · Call the toll free National Suicide Prevention Hotlines   · National Suicide Prevention Lifeline 065-900-OMAO (2823)  · National [employer] Network 800-SUICIDE (917-9047)

## 2020-05-16 NOTE — PROGRESS NOTES
Assumed care of patient, bedside report received from VISHNU Peralta. Updated on POC, call light within reach and fall precautions in place. Bed locked and in lowest position. Patient instructed to call for assistance before getting out of bed. All questions answered, no other needs at this time.

## 2020-05-16 NOTE — PROGRESS NOTES
Received bedside report from VISHNU Marshall & VISHNU Kat. POC discussed. First assessment completed, call light within reach. Fall precautions within place. Will continue to monitor.

## 2020-05-16 NOTE — THERAPY
Physical Therapy   Initial Evaluation     Patient Name: Thalia Horton  Age:  89 y.o., Sex:  female  Medical Record #: [id number]  Today's Date: 5/16/2020    Assessment  Patient is 89 y.o. female with c/c of syncope, ongoing w/u; BP low but steady throughout change in position and ambulation. Pt mobilizing very well for age and is very independent; describes conditions of fall that appears syncopal in nature as she was standing statically, developed vision changes and bilateral LE weakness that caused her to sit; from a PT perspective, pt appears functionally capable of dc home when medically appropriate to do so; will follow.     Plan    Recommend Physical Therapy 2 times per week until therapy goals are met for the following treatments:  Gait Training, Manual Therapy, Neuro Re-Education / Balance, Self Care/Home Evaluation, Stair Training, Therapeutic Activities and Therapeutic Exercises    Discharge recommendations:  Home with home health PT     Subjective/Abridged Functional Mobility     05/16/20 0935   Prior Living Situation   Prior Services Home-Independent   Housing / Facility Assisted Living Residence   Steps Into Home 0   Steps In Home 0   Equipment Owned 4-Wheel Walker   Comments facility provide meals and cleaning; can have assist if needed but does not like to ask; reports occasional falls    Prior Level of Functional Mobility   Bed Mobility Independent   Transfer Status Independent   Ambulation Independent   Distance Ambulation (Feet)   (to tolerance)   Assistive Devices Used 4-Wheel Walker   Stairs Independent   Gait Analysis   Gait Level Of Assist Supervised   Assistive Device 4 Wheel Walker   Distance (Feet) 300   Deviation Decreased Base Of Support   Vision Deficits Impacting Mobility blurry vision but 'varies'    Skilled Intervention Postural Facilitation;Sequencing   Comments distance limited by therapist, pt denies symptoms throughout session, good rocky; can carry conversation in moving  envionment without loss of balance    Bed Mobility    Supine to Sit Modified Independent   Sit to Supine   (NT, sitting in chair post )   Functional Mobility   Sit to Stand Supervised   Bed, Chair, Wheelchair Transfer Supervised   Short Term Goals    Short Term Goal # 1 Pt will ambulate x 150 with 4WW and supervision with 'talk test' pacing to ensure independence.    Short Term Goal # 2 Pt will demonstrate independence with HEP aimed at LE endurance/ROM within 6 visits to ensure independent mobility at home.    Education Group   Education Provided Role of Physical Therapist;Exercises - Supine;Exercises - Seated   Role of Physical Therapist Patient Response Patient;Acceptance;Explanation;Demonstration;Verbal Demonstration;Action Demonstration   Exercises - Supine Patient Response Patient;Acceptance;Explanation;Demonstration;Handout;Verbal Demonstration;Action Demonstration  (ankle pumps, heel slides )   Exercises - Seated Patient Response Patient;Acceptance;Explanation;Demonstration;Handout;Action Demonstration;Verbal Demonstration;Reinforcement Needed  (long arc quads)   Anticipated Discharge Equipment   DC Equipment None

## 2020-05-16 NOTE — CARE PLAN
Problem: Communication  Goal: The ability to communicate needs accurately and effectively will improve  Outcome: PROGRESSING AS EXPECTED   Patient updated on POC  Problem: Safety  Goal: Will remain free from injury  Outcome: PROGRESSING AS EXPECTED   Bed alarm in use, call light within reach. Patient educated to call before getting up.   Problem: Knowledge Deficit  Goal: Knowledge of disease process/condition, treatment plan, diagnostic tests, and medications will improve  Outcome: PROGRESSING AS EXPECTED   Updated on test results and lab values. All questions answered

## 2020-05-17 ENCOUNTER — HOME HEALTH ADMISSION (OUTPATIENT)
Dept: HOME HEALTH SERVICES | Facility: HOME HEALTHCARE | Age: 85
End: 2020-05-17
Payer: MEDICARE

## 2020-05-17 NOTE — DISCHARGE PLANNING
ATTN: Case Management  RE: Referral for Home Health    As of 5/17/20, we have accepted the Home Health referral for the patient listed above.    A [facility] clinician will be out to see the patient within 48 hours. If you have any questions or concerns regarding the patient's transition to Home Health, please do not hesitate to contact us at [phone].      We look forward to collaborating with you,  [facility] Team

## 2020-05-17 NOTE — DISCHARGE PLANNING
Received Choice form at 6609 on 5/16/20  Agency/Facility Name: [facility]  Referral sent per Choice form @ 1838

## 2020-05-17 NOTE — DISCHARGE SUMMARY
Discharge Summary    CHIEF COMPLAINT ON ADMISSION  Chief Complaint   Patient presents with   • Shortness of Breath     Pt BIBA c/o DESAI since waking, per EMS pt had a BP 73/46 upon standing , 250cc bolus 120/80 on arrival.    • Hypotension       Reason for Admission  EMS      Admission Date  5/15/2020    CODE STATUS  DNR/DNI    HPI & HOSPITAL COURSE  89-year-old female with history of COPD, GERD, hyperlipidemia and thyroid disease, recent mechanical fall, presented 5/15 with shortness of breath and hypotension.  She was noted to have low blood pressure 73/46 and was given IV fluid by EMS before she was brought to the hospital.  In ER her condition was stable, but she noted to have 6 beats of nonsustained V. tach in the ER, associated with palpitation.  Therefore, she was admitted to the hospital for observation.  There was no significant ectopy observed on telemetry overnight.  Blood pressure has been stable.  Patient was evaluated with echo and CT of pulmonary artery.  Echo showed EF 60%, mild to moderate mitral regurgitation, indeterminate diastolic function.  CT of pulmonary artery was negative for PE.  As patient condition has been stable, she was discharged home.  She was referred for Holter monitoring.  She will follow with cardiology in clinic.       Therefore, she is discharged in good and stable condition to home with close outpatient follow-up.        Discharge Date  5/16/2020    FOLLOW UP ITEMS POST DISCHARGE  PCP  Cardiology    DISCHARGE DIAGNOSES  Principal Problem:    COPD (chronic obstructive pulmonary disease) (HCC) POA: Unknown  Active Problems:    Fall POA: Yes    V-tach (HCC) POA: Yes    Mixed hyperlipidemia POA: Yes    Gastroesophageal reflux disease without esophagitis POA: Yes    Essential hypertension POA: Yes    Advance care planning POA: Unknown    Acquired hypothyroidism POA: Yes  Resolved Problems:    * No resolved hospital problems. *      FOLLOW UP  Future Appointments   Date Time  Provider Department Center   5/20/2020  1:40 PM DOE Paiz [facility] DOE [address]  [address]  [place] [id number] [phone]      As needed    Hector Brooke M.D.  [address]  [facility] [address]  [place] [address] [phone]      they will call you to  zio       MEDICATIONS ON DISCHARGE     Medication List      CONTINUE taking these medications      Instructions   albuterol 108 (90 Base) MCG/ACT Aers inhalation aerosol   INHALE 2 PUFFS BY MOUTH EVERY 6 HOURS AS NEEDED FOR SHORTNESS OF BREATH     ASPIRIN 81 PO   Take 81 mg by mouth every day.  Dose:  81 mg     atorvastatin 20 MG Tabs  Commonly known as:  LIPITOR   Take 1 Tab by mouth every day. FOR CHOLESTEROL.  Dose:  20 mg     erythromycin 5 MG/GM Oint   NEETU THIN LAYER IN OD QHS     escitalopram 10 MG Tabs  Commonly known as:  LEXAPRO   Take 1 Tab by mouth every bedtime. FOR DEPRESSION.  Dose:  10 mg     ezetimibe 10 MG Tabs  Commonly known as:  ZETIA   Take 1 Tab by mouth every day. FOR CHOLESTEROL/STROKE PREVENTION  Dose:  10 mg     levothyroxine 25 MCG Tabs  Commonly known as:  SYNTHROID   Take 1 Tab by mouth Every morning on an empty stomach. FOR THYROID.  Dose:  25 mcg     metoprolol SR 25 MG Tb24  Commonly known as:  TOPROL XL   TAKE 1/2 TABLET BY MOUTH EVERY DAY     MULTIVITAMIN ADULT PO   Take 1 Tab by mouth every day. Indications: Centrum  Dose:  1 Tab     omeprazole 20 MG delayed-release capsule  Commonly known as:  PRILOSEC   TAKE 1 CAPSULE BY MOUTH EVERY DAY            Allergies  Allergies   Allergen Reactions   • Penicillins Vomiting       DIET  No orders of the defined types were placed in this encounter.      ACTIVITY  As tolerated.  Weight bearing as tolerated    CONSULTATIONS  None    PROCEDURES  None    LABORATORY  Lab Results   Component Value Date    SODIUM 140 05/16/2020    POTASSIUM 3.7 05/16/2020    CHLORIDE 102 05/16/2020    CO2 26 05/16/2020    GLUCOSE 116 (H)  05/16/2020    BUN 17 05/16/2020    CREATININE 0.52 05/16/2020        Lab Results   Component Value Date    WBC 5.9 05/16/2020    HEMOGLOBIN 13.2 05/16/2020    HEMATOCRIT 39.8 05/16/2020    PLATELETCT 208 05/16/2020      CT-CTA CHEST PULMONARY ARTERY W/ RECONS   Final Result      1.  No evidence of pulmonary embolism.      2.  Clear lung parenchyma.            EC-ECHOCARDIOGRAM COMPLETE W/O CONT   Final Result      DX-CHEST-PORTABLE (1 VIEW)   Final Result      1.  Increased interstitial markings in the lung bases may be chronic in nature from interstitial disease. Atypical infection/peribronchial inflammation could have a similar appearance            Total time of the discharge process exceeds 37 minutes.

## 2020-05-17 NOTE — PROGRESS NOTES
Patient discharged home. Patient AOX 4.  IV and tele box removed, discharge instructions provided to patient and reviewed with them. All questions answered, patient provided copy of discharge instructions and instructed on when to F/U with MD. Patient wheeled off unit. Patient discharged into the care of son Franklin Matson at [facility] notified of patient returning to facility.

## 2020-05-18 ENCOUNTER — TELEPHONE (OUTPATIENT)
Dept: CARDIOLOGY | Facility: MEDICAL CENTER | Age: 85
End: 2020-05-18

## 2020-05-18 ENCOUNTER — TELEPHONE (OUTPATIENT)
Dept: MEDICAL GROUP | Facility: PHYSICIAN GROUP | Age: 85
End: 2020-05-18

## 2020-05-18 NOTE — TELEPHONE ENCOUNTER
Phone Number Called: [phone]    Call outcome: Spoke with Son Freddie Méndez    Message: Changed appt to Virtual Visit.

## 2020-05-18 NOTE — TELEPHONE ENCOUNTER
POLST? It is no problem, but if they are unable to do a virtual please schedule them with someone who is in office. They will need an appointment :)

## 2020-05-18 NOTE — TELEPHONE ENCOUNTER
VOICEMAIL  1. Caller Name: Freddie Méndez                      Call Back Number: [phone]    2. Message: Called wanting to ask about paperwork will need to have completed by PCP, specifically a Pulse

## 2020-05-20 ENCOUNTER — TELEMEDICINE (OUTPATIENT)
Dept: MEDICAL GROUP | Facility: PHYSICIAN GROUP | Age: 85
End: 2020-05-20
Payer: MEDICARE

## 2020-05-20 VITALS
WEIGHT: 148 LBS | SYSTOLIC BLOOD PRESSURE: 111 MMHG | HEIGHT: 64 IN | BODY MASS INDEX: 25.27 KG/M2 | DIASTOLIC BLOOD PRESSURE: 65 MMHG

## 2020-05-20 DIAGNOSIS — J42 CHRONIC BRONCHITIS, UNSPECIFIED CHRONIC BRONCHITIS TYPE (HCC): ICD-10-CM

## 2020-05-20 DIAGNOSIS — I47.20 V-TACH (HCC): ICD-10-CM

## 2020-05-20 DIAGNOSIS — R55 SYNCOPE, UNSPECIFIED SYNCOPE TYPE: ICD-10-CM

## 2020-05-20 DIAGNOSIS — I10 ESSENTIAL HYPERTENSION: ICD-10-CM

## 2020-05-20 DIAGNOSIS — G47.19 EXCESSIVE DAYTIME SLEEPINESS: ICD-10-CM

## 2020-05-20 PROCEDURE — 99214 OFFICE O/P EST MOD 30 MIN: CPT | Mod: 95,CR | Performed by: NURSE PRACTITIONER

## 2020-05-20 ASSESSMENT — FIBROSIS 4 INDEX: FIB4 SCORE: 2.65

## 2020-05-20 NOTE — Clinical Note
You will see her next week to do her POLST. I would appreciate any suggestions you have regarding her care, or other considerations regarding her feeling unwell particularly in the morning and syncope. I am concerned as she is not always forthcoming.

## 2020-05-21 ENCOUNTER — TELEPHONE (OUTPATIENT)
Dept: MEDICAL GROUP | Facility: PHYSICIAN GROUP | Age: 85
End: 2020-05-21

## 2020-05-21 NOTE — ASSESSMENT & PLAN NOTE
"6 beats of nonsustained V. tach were noted in ER.  Some concern as patient has been feeling intermittent palpitations associated with dizziness, sometimes shortness of breath.  Patient states she is only had 2 syncopal episodes states that initially she reported that with her previous fall where she hit her face on the rail that she did not pass out and was conscious the entire time.  Patient stated that she turned too quickly and that is why she had her head.  Patient reports today that this was not entirely correct and that she does believe she had a syncopal episode at that time.  Orthostatic blood pressures in the office 5/7/2020 were negative.  Patient did not report dizziness on standing, there is concern for dysrhythmia, low blood pressure.  At this time discussed with patient the need for close follow-up with cardiology.  Patient is established with Dr. Teresa.  At released from hospital there had been no noted recurrence of V. tach or other dysrhythmia.  Patient states since her release from the hospital she has continued to feel dizzy when she stands up quickly.  States she is not taking metoprolol at this time.  Discussed with patient results of echocardiogram as patient states they were told \"results were fine\" in the hospital and were hoping for more information.  "

## 2020-05-21 NOTE — TELEPHONE ENCOUNTER
Patients son called and would like a call back concerning the Zio Patch that was suppose to be ordered and that the patient is having low B/P in the mornings and has him concerned.  Please call Freddie Méndez at [phone].  Please Advise

## 2020-05-21 NOTE — ASSESSMENT & PLAN NOTE
"Today patient states that she continues to feel very sleepy during the day especially in the morning time.  Does report that she \"might snore\" will follow-up on ordering overnight oximetry study.  "

## 2020-05-21 NOTE — PROGRESS NOTES
"Telemedicine Visit: Established Patient     This encounter was conducted via Zoom .   Verbal consent was obtained. Patient's identity was verified.    Subjective:   CC: syncope/hospital follow-up  Thalia Horton is a 89 y.o. female presenting for evaluation and management of:    COPD (chronic obstructive pulmonary disease) (HCC)  Chronic in nature.  Stable.  Patient states that initially her symptoms prior to going to the hospital started with shortness of breath, wheezing.  States that she has not suffered significant shortness of breath since her release from the hospital.  Does mention mild shortness of breath alleviated by inhalers.    V-tach ([facility])  6 beats of nonsustained V. tach were noted in ER.  Some concern as patient has been feeling intermittent palpitations associated with dizziness, sometimes shortness of breath.  Patient states she is only had 2 syncopal episodes states that initially she reported that with her previous fall where she hit her face on the rail that she did not pass out and was conscious the entire time.  Patient stated that she turned too quickly and that is why she had her head.  Patient reports today that this was not entirely correct and that she does believe she had a syncopal episode at that time.  Orthostatic blood pressures in the office 5/7/2020 were negative.  Patient did not report dizziness on standing, there is concern for dysrhythmia, low blood pressure.  At this time discussed with patient the need for close follow-up with cardiology.  Patient is established with Dr. Teresa.  At released from hospital there had been no noted recurrence of V. tach or other dysrhythmia.  Patient states since her release from the hospital she has continued to feel dizzy when she stands up quickly.  States she is not taking metoprolol at this time.  Discussed with patient results of echocardiogram as patient states they were told \"results were fine\" in the hospital and were hoping " "for more information.    Essential hypertension  Chronic in nature.  Stable.  Blood pressure reported today is 111/65.  Patient is being seen by home health nurses at the place where she lives.  Plan to continue close monitoring.     Excessive daytime sleepiness  Today patient states that she continues to feel very sleepy during the day especially in the morning time.  Does report that she \"might snore\" will follow-up on ordering overnight oximetry study.        ROS   Denies any recent fevers or chills. No nausea or vomiting. No chest pains or shortness of breath.     Allergies   Allergen Reactions   • Penicillins Vomiting       Current medicines (including changes today)  Current Outpatient Medications   Medication Sig Dispense Refill   • omeprazole (PRILOSEC) 20 MG delayed-release capsule TAKE 1 CAPSULE BY MOUTH EVERY DAY 90 Cap 3   • ASPIRIN 81 PO Take 81 mg by mouth every day.     • Multiple Vitamins-Minerals (MULTIVITAMIN ADULT PO) Take 1 Tab by mouth every day. Indications: Centrum     • albuterol 108 (90 Base) MCG/ACT Aero Soln inhalation aerosol INHALE 2 PUFFS BY MOUTH EVERY 6 HOURS AS NEEDED FOR SHORTNESS OF BREATH 1 Inhaler 6   • erythromycin 5 MG/GM Ointment NEETU THIN LAYER IN OD QHS  0   • escitalopram (LEXAPRO) 10 MG Tab Take 1 Tab by mouth every bedtime. FOR DEPRESSION. 90 Tab 3   • levothyroxine (SYNTHROID) 25 MCG Tab Take 1 Tab by mouth Every morning on an empty stomach. FOR THYROID. 90 Tab 3   • atorvastatin (LIPITOR) 20 MG Tab Take 1 Tab by mouth every day. FOR CHOLESTEROL. 90 Tab 2   • metoprolol SR (TOPROL XL) 25 MG TABLET SR 24 HR TAKE 1/2 TABLET BY MOUTH EVERY DAY (Patient not taking: Reported on 5/20/2020) 90 Tab 0   • ezetimibe (ZETIA) 10 MG Tab Take 1 Tab by mouth every day. FOR CHOLESTEROL/STROKE PREVENTION 90 Tab 3     No current facility-administered medications for this visit.        Patient Active Problem List    Diagnosis Date Noted   • V-tach ([facility])      Priority: High   • Fall " "05/08/2020     Priority: High   • COPD (chronic obstructive pulmonary disease) (HCC) 07/19/2018     Priority: High   • Acquired hypothyroidism 07/19/2018     Priority: Low   • Syncope 05/20/2020   • Advance care planning 05/16/2020   • Excessive daytime sleepiness 04/29/2020   • Chronic pain of both shoulders 04/09/2020   • Balance problem 04/09/2020   • Essential hypertension 03/26/2020   • Neck pain 01/24/2020   • Viral gastroenteritis 06/27/2019   • Mild cognitive impairment 04/02/2019   • TIA (transient ischemic attack) 01/22/2019   • History of angina pectoris 09/13/2018   • Osteoarthritis of multiple joints 07/19/2018   • Mixed hyperlipidemia 07/19/2018   • Atherosclerosis of both carotid arteries 07/19/2018   • Gastroesophageal reflux disease without esophagitis 07/19/2018   • Bilateral lower extremity edema 07/19/2018       Family History   Problem Relation Age of Onset   • Stroke Mother 69       She  has a past medical history of COPD (chronic obstructive pulmonary disease) (HCC), GERD (gastroesophageal reflux disease), Heart palpitations, Hyperlipidemia, Osteoarthritis, and Thyroid disease.  She  has a past surgical history that includes hip replacement, total (Left).       Objective:   /65 (BP Location: Left arm, Patient Position: Sitting) Comment: Pt reported from recent Home health visit  Ht 1.626 m (5' 4\") Comment: Pt reported  Wt 67.1 kg (148 lb) Comment: Pt reported  LMP  (LMP Unknown)   BMI 25.40 kg/m²     Physical Exam:  Constitutional: Alert, no distress, well-groomed.  Skin: No rashes in visible areas.  Eye: Round. Conjunctiva clear, lids normal. No icterus.   ENMT: Lips pink without lesions, good dentition, moist mucous membranes. Phonation normal.  Neck: No masses, no thyromegaly. Moves freely without pain.  CV: Pulse as reported by patient  Respiratory: Unlabored respiratory effort, no cough or audible wheeze  Psych: Alert and oriented x3, normal affect and mood.       Assessment and " Plan:   The following treatment plan was discussed:     1. Chronic bronchitis, unspecified chronic bronchitis type (HCC)  Continue current plan of care. Recent SOB is likely cardiac in origin.    2. V-tach (HCC)  zio patch, cardiology follow-up as needed.    3. Essential hypertension  Patient is not taking metoprolol, advised to discuss with cardiology.    4. Excessive daytime sleepiness  Complete oximetry.  - Overnight Oximetry; Future    5. Syncope, unspecified syncope type  Discussed close follow-up with cardiology.    Patient is counseled extensively regarding safety, advised to get up slowly and stand for a minute before moving forward.  Discussed with patient strict ER return precautions. Patient is encouraged to be seen in the emergency room for chest pain, palpitations, shortness of breath, dizziness, severe abdominal pain or other concerning symptoms.          Follow-up: Return in about 2 weeks (around 6/3/2020), or if symptoms worsen or fail to improve.

## 2020-05-21 NOTE — ASSESSMENT & PLAN NOTE
Chronic in nature.  Stable.  Patient states that initially her symptoms prior to going to the hospital started with shortness of breath, wheezing.  States that she has not suffered significant shortness of breath since her release from the hospital.  Does mention mild shortness of breath alleviated by inhalers.

## 2020-05-21 NOTE — ASSESSMENT & PLAN NOTE
Chronic in nature.  Stable.  Blood pressure reported today is 111/65.  Patient is being seen by home health nurses at the place where she lives.  Plan to continue close monitoring.

## 2020-05-22 ENCOUNTER — TELEPHONE (OUTPATIENT)
Dept: CARDIOLOGY | Facility: MEDICAL CENTER | Age: 85
End: 2020-05-22

## 2020-05-22 ENCOUNTER — TELEPHONE (OUTPATIENT)
Dept: HEALTH INFORMATION MANAGEMENT | Facility: OTHER | Age: 85
End: 2020-05-22

## 2020-05-22 NOTE — TELEPHONE ENCOUNTER
1. Caller Name: Freddie boswell son               Call Back Number: [phone]  [facility] PCP or Specialty Provider: Yes         2.  Does patient have any new or worsening symptoms of respiratory illness? Yes, the patient reports the following respiratory symptoms: shortness of breath or difficulty breathing.    3.  Does patient have any comoribidities? COPD    4.  Has the patient traveled in the last 14 days OR had any known contact with someone who is suspected or confirmed to have COVID-19?  No.    5. Disposition: Cleared by RN Triage as potential is low for COVID-19; OK to keep/schedule appointment    Note routed to [facility] Provider: NICK only.   Pt has sob related to heart condition. Was seen in er last Friday for this and was tested for covid that day-NEGATIVE. Pt has been quarantined since.  Pt needs to follow up with cardiology and is cleared to do so.

## 2020-05-22 NOTE — TELEPHONE ENCOUNTER
TW    Patient son, Freddie, called to advise that the Pt woke with low BP and Shortness of breath. They would also like to know the results from the echo they had done in the ER 5/16/2020. You can contact Freddie back at [phone].    Thank you,  Jennifer OZUNA

## 2020-05-22 NOTE — TELEPHONE ENCOUNTER
Form has been faxed to [employer] [phone] and scanned in [employer]. Patient was seen there is an office visit in her chart. ALISE

## 2020-05-22 NOTE — TELEPHONE ENCOUNTER
It looks like patient spoke with Cardiology and the zio patch is ordered. Please call them and check that they were able to get an appointment with cardiology as I am hoping they can be seen urgently. If symptoms worsen I would recommend being seen in the ER.     I ordered an overnight oximetry at her last appointment, I don't see that it has been sent to a DME to be completed? Can the overnight oximetry order be filled out? I will ask Dr. Cheek if she can sign it.

## 2020-05-22 NOTE — TELEPHONE ENCOUNTER
Phone Number Called: [phone] (home)       Call outcome: Left detailed message for patient. Informed to call back with any additional questions.    Message: Son called again requesting a call back . Called and LVM.

## 2020-05-22 NOTE — TELEPHONE ENCOUNTER
"Called pt's son Freddie to discuss. He states pt currently lives in home health care independent living facility [facility]. He states pt has had low BP readings from yesterday morning.    Sitting 110/65  After activity 70/45  Lying down for 5 minutes 85/52  Standing 90/55 HR 55  90/50 HR 87    He was told her heart beat was \"faint\". He reports pt has no energy in the morning after she wakes up, some difficulty breathing, however by noon to 2pm, pt feels ok.    He was told a Ziopatch monitor was ordered for pt while she was admitted in the hospital 2 weeks ago. Noted order was placed by Niya FOX. Advised will get information regarding this order and reach out to him again.    Advised [facility] our office, verbalized understanding. Appreciative of call. Transferred call to schedulers.  "

## 2020-05-22 NOTE — TELEPHONE ENCOUNTER
Freddie called back and wanted to know if TW would be okay with signing A DNR pulse? Please call Freddie back.     Thank you,  Jennifer OZUNA

## 2020-05-28 ENCOUNTER — TELEPHONE (OUTPATIENT)
Dept: CARDIOLOGY | Facility: MEDICAL CENTER | Age: 85
End: 2020-05-28

## 2020-05-28 ENCOUNTER — OFFICE VISIT (OUTPATIENT)
Dept: CARDIOLOGY | Facility: MEDICAL CENTER | Age: 85
End: 2020-05-28
Payer: MEDICARE

## 2020-05-28 ENCOUNTER — PATIENT MESSAGE (OUTPATIENT)
Dept: CARDIOLOGY | Facility: MEDICAL CENTER | Age: 85
End: 2020-05-28

## 2020-05-28 VITALS
HEART RATE: 88 BPM | BODY MASS INDEX: 24.59 KG/M2 | WEIGHT: 144 LBS | OXYGEN SATURATION: 92 % | HEIGHT: 64 IN | DIASTOLIC BLOOD PRESSURE: 80 MMHG | SYSTOLIC BLOOD PRESSURE: 132 MMHG

## 2020-05-28 DIAGNOSIS — I65.23 ATHEROSCLEROSIS OF BOTH CAROTID ARTERIES: ICD-10-CM

## 2020-05-28 DIAGNOSIS — Z66 DNR (DO NOT RESUSCITATE): ICD-10-CM

## 2020-05-28 DIAGNOSIS — G45.9 TIA (TRANSIENT ISCHEMIC ATTACK): ICD-10-CM

## 2020-05-28 DIAGNOSIS — I47.29 NSVT (NONSUSTAINED VENTRICULAR TACHYCARDIA) (HCC): ICD-10-CM

## 2020-05-28 DIAGNOSIS — Z72.820 POOR SLEEP: ICD-10-CM

## 2020-05-28 PROCEDURE — 99214 OFFICE O/P EST MOD 30 MIN: CPT | Performed by: INTERNAL MEDICINE

## 2020-05-28 ASSESSMENT — FIBROSIS 4 INDEX: FIB4 SCORE: 2.65

## 2020-05-28 NOTE — PROGRESS NOTES
Chief Complaint   Patient presents with   • Aortic Atherosclerosis   • HTN (Controlled)       Subjective:   Thalia Horton is a 89 y.o. female who presents today for follow-up regarding PAD.  Has a history of hypertension hyperlipidemia and bilateral carotid artery stenosis which has progressed to 70% on one side and 50-69% on the other.  She developed TIA symptoms characterized by word finding difficulty and expressive aphasia.  This has resolved.  She was taking aspirin at the time.  She is now followed by vascular surgery.  Attempted to advance her antiplatelet therapy at last visit they are unsure if they have her were able to take the medications.    In the interim she sustained a fall and was seen in the hospital and found to be dehydrated with hypokalemia and hypomagnesemia.  She had 4 beats of nonsustained VT which prompted admission.  These were corrected and it did not recur.  They are quite concerned about this as they were told it could be life-threatening.  Also she wishes to be DNR and they wish to fill out a POLST today.    Past Medical History:   Diagnosis Date   • COPD (chronic obstructive pulmonary disease) (HCC)    • GERD (gastroesophageal reflux disease)    • Heart palpitations    • Hyperlipidemia    • Osteoarthritis    • Thyroid disease      Past Surgical History:   Procedure Laterality Date   • HIP REPLACEMENT, TOTAL Left      Family History   Problem Relation Age of Onset   • Stroke Mother 69     Social History     Socioeconomic History   • Marital status:      Spouse name: Not on file   • Number of children: Not on file   • Years of education: Not on file   • Highest education level: Not on file   Occupational History   • Not on file   Social Needs   • Financial resource strain: Not on file   • Food insecurity     Worry: Not on file     Inability: Not on file   • Transportation needs     Medical: Not on file     Non-medical: Not on file   Tobacco Use   • Smoking status: Former Smoker      Packs/day: 1.00     Years: 55.00     Pack years: 55.00     Types: Cigarettes     Last attempt to quit: 2013     Years since quittin.8   • Smokeless tobacco: Never Used   Substance and Sexual Activity   • Alcohol use: Yes     Alcohol/week: 6.0 oz     Types: 10 Glasses of wine per week   • Drug use: No   • Sexual activity: Never   Lifestyle   • Physical activity     Days per week: Not on file     Minutes per session: Not on file   • Stress: Not on file   Relationships   • Social connections     Talks on phone: Not on file     Gets together: Not on file     Attends [employer] service: Not on file     Active member of club or organization: Not on file     Attends meetings of clubs or organizations: Not on file     Relationship status: Not on file   • Intimate partner violence     Fear of current or ex partner: Not on file     Emotionally abused: Not on file     Physically abused: Not on file     Forced sexual activity: Not on file   Other Topics Concern   • Not on file   Social History Narrative   • Not on file     Allergies   Allergen Reactions   • Penicillins Vomiting     Outpatient Encounter Medications as of 2020   Medication Sig Dispense Refill   • omeprazole (PRILOSEC) 20 MG delayed-release capsule TAKE 1 CAPSULE BY MOUTH EVERY DAY 90 Cap 3   • ASPIRIN 81 PO Take 81 mg by mouth every day.     • Multiple Vitamins-Minerals (MULTIVITAMIN ADULT PO) Take 1 Tab by mouth every day. Indications: Centrum     • albuterol 108 (90 Base) MCG/ACT Aero Soln inhalation aerosol INHALE 2 PUFFS BY MOUTH EVERY 6 HOURS AS NEEDED FOR SHORTNESS OF BREATH 1 Inhaler 6   • erythromycin 5 MG/GM Ointment NEETU THIN LAYER IN OD QHS  0   • escitalopram (LEXAPRO) 10 MG Tab Take 1 Tab by mouth every bedtime. FOR DEPRESSION. 90 Tab 3   • levothyroxine (SYNTHROID) 25 MCG Tab Take 1 Tab by mouth Every morning on an empty stomach. FOR THYROID. 90 Tab 3   • ezetimibe (ZETIA) 10 MG Tab Take 1 Tab by mouth every day. FOR  "CHOLESTEROL/STROKE PREVENTION 90 Tab 3   • atorvastatin (LIPITOR) 20 MG Tab Take 1 Tab by mouth every day. FOR CHOLESTEROL. 90 Tab 2   • metoprolol SR (TOPROL XL) 25 MG TABLET SR 24 HR TAKE 1/2 TABLET BY MOUTH EVERY DAY (Patient not taking: Reported on 5/20/2020) 90 Tab 0     No facility-administered encounter medications on file as of 5/28/2020.      Review of Systems   All other systems reviewed and are negative.       Objective:   /80 (BP Location: Left arm, Patient Position: Sitting, BP Cuff Size: Adult)   Pulse 88   Ht 1.626 m (5' 4\")   Wt 65.3 kg (144 lb)   LMP  (LMP Unknown)   SpO2 92%   BMI 24.72 kg/m²     Physical Exam   Constitutional: She is oriented to person, place, and time. She appears well-developed and well-nourished. No distress.   HENT:   Head: Normocephalic.   Right Ear: External ear normal.   Left Ear: External ear normal.   Mouth/Throat: No oropharyngeal exudate.   Hematoma left supraorbital area, resolving   Eyes: Pupils are equal, round, and reactive to light. Conjunctivae and EOM are normal. Right eye exhibits no discharge. Left eye exhibits no discharge. No scleral icterus.   Neck: Normal range of motion. Neck supple. No JVD present. No tracheal deviation present. No thyromegaly present.   Cardiovascular: Normal rate, regular rhythm, S1 normal, S2 normal and intact distal pulses. PMI is not displaced. Exam reveals no gallop, no S3, no S4 and no friction rub.   Murmur heard.  Pulses:       Carotid pulses are 2+ on the right side and 2+ on the left side.       Radial pulses are 2+ on the right side and 2+ on the left side.        Popliteal pulses are 2+ on the right side and 2+ on the left side.        Dorsalis pedis pulses are 2+ on the right side and 2+ on the left side.        Posterior tibial pulses are 2+ on the right side and 2+ on the left side.   Pulmonary/Chest: Effort normal and breath sounds normal. No respiratory distress. She has no wheezes. She has no rales. She " exhibits no tenderness.   Abdominal: Soft. Bowel sounds are normal. She exhibits no distension. There is no abdominal tenderness.   Musculoskeletal: Normal range of motion.         General: No tenderness or edema.   Neurological: She is alert and oriented to person, place, and time. No cranial nerve deficit (Cranial nerves II through XII grossly intact).   Skin: Skin is warm and dry. No rash noted. She is not diaphoretic. No erythema.   Psychiatric: She has a normal mood and affect. Her behavior is normal. Judgment and thought content normal.   Vitals reviewed.    LABS:  Lab Results   Component Value Date/Time    CHOLSTRLTOT 201 (H) 09/06/2018 06:51 PM     (H) 09/06/2018 06:51 PM    HDL 55 09/06/2018 06:51 PM    TRIGLYCERIDE 199 (H) 09/06/2018 06:51 PM       Lab Results   Component Value Date/Time    WBC 5.9 05/16/2020 02:44 AM    RBC 4.04 (L) 05/16/2020 02:44 AM    HEMOGLOBIN 13.2 05/16/2020 02:44 AM    HEMATOCRIT 39.8 05/16/2020 02:44 AM    MCV 98.5 (H) 05/16/2020 02:44 AM    NEUTSPOLYS 48.90 05/16/2020 02:44 AM    LYMPHOCYTES 33.60 05/16/2020 02:44 AM    MONOCYTES 15.00 (H) 05/16/2020 02:44 AM    EOSINOPHILS 1.50 05/16/2020 02:44 AM    BASOPHILS 0.70 05/16/2020 02:44 AM     Lab Results   Component Value Date/Time    SODIUM 140 05/16/2020 02:44 AM    POTASSIUM 3.7 05/16/2020 02:44 AM    CHLORIDE 102 05/16/2020 02:44 AM    CO2 26 05/16/2020 02:44 AM    GLUCOSE 116 (H) 05/16/2020 02:44 AM    BUN 17 05/16/2020 02:44 AM    CREATININE 0.52 05/16/2020 02:44 AM     Lab Results   Component Value Date    HBA1C 5.7 (H) 05/16/2020      Lab Results   Component Value Date/Time    ALKPHOSPHAT 66 05/15/2020 11:24 AM    ASTSGOT 24 05/15/2020 11:24 AM    ALTSGPT 15 05/15/2020 11:24 AM    TBILIRUBIN 0.5 05/15/2020 11:24 AM      No results found for: BNPBTYPENAT   No results found for: TSH  No results found for: PROTHROMBTM, INR     EKG (1/22/2019):  I have personally reviewed the EKG this visit and discussed with the  patient.  Sinus rhythm, left axis deviation, poor R wave progression.    ECHO CONCLUSIONS (5/15/2020):  Normal left ventricular systolic function.  Left ventricular ejection fraction is visually estimated to be 60%.  Indeterminate diastolic function.  The right ventricle was not well visualized but appeared to be normal   in size and function.  Mild to moderate mitral regurgitation.  Moderate mitral annular calcification.  Aortic sclerosis without stenosis.  Normal estimated right atrial pressure.   Unable to estimate pulmonary artery pressure due to an inadequate   tricuspid regurgitant jet.     No prior study is available for comparison.       Assessment:     1. TIA (transient ischemic attack)     2. Atherosclerosis of both carotid arteries     3. NSVT (nonsustained ventricular tachycardia) ([facility])     4. DNR (do not resuscitate)         Medical Decision Making:  Today's Assessment / Status / Plan:     Doing well.  Dehydrated resulting in a fall and hospitalization.  Feeling better now we discussed hydration.  Brief asymptomatic NSVT noted on telemetry monitor was related to electrolyte disturbances in the setting and as not indicative of anything further.  It has not recurred.  She is feeling better.  She wishes to be DO NOT RESUSCITATE DO NOT INTUBATE and I have helped her fill out these forms with her son today.  Recommend continuing her current other medical therapy.  Recommend advancement of her aspirin to Plavix as dipyridamole was not affordable.  They will call and see if she is still taking the Plavix or not she is unsure.

## 2020-05-28 NOTE — PATIENT COMMUNICATION
OPO order completed and faxed to [facility] F#:[phone] P#:[phone], confirmation fax sent to scan.

## 2020-05-28 NOTE — TELEPHONE ENCOUNTER
Completed POLST form in clinic with pt and her son. Questions answered. POLST form sent to scanning and pt checked out with form along with FAQs sheet.

## 2020-06-01 DIAGNOSIS — F33.41 RECURRENT MAJOR DEPRESSIVE DISORDER, IN PARTIAL REMISSION (HCC): ICD-10-CM

## 2020-06-01 DIAGNOSIS — E03.9 ACQUIRED HYPOTHYROIDISM: ICD-10-CM

## 2020-06-01 RX ORDER — LEVOTHYROXINE SODIUM 0.03 MG/1
TABLET ORAL
Qty: 90 TAB | Refills: 0 | Status: SHIPPED | OUTPATIENT
Start: 2020-06-01 | End: 2020-08-27

## 2020-06-01 RX ORDER — ESCITALOPRAM OXALATE 10 MG/1
TABLET ORAL
Qty: 90 TAB | Refills: 0 | Status: SHIPPED | OUTPATIENT
Start: 2020-06-01 | End: 2020-08-27

## 2020-06-02 ENCOUNTER — TELEPHONE (OUTPATIENT)
Dept: CARDIOLOGY | Facility: MEDICAL CENTER | Age: 85
End: 2020-06-02

## 2020-06-02 DIAGNOSIS — I47.29 NSVT (NONSUSTAINED VENTRICULAR TACHYCARDIA) (HCC): ICD-10-CM

## 2020-06-02 DIAGNOSIS — G47.19 EXCESSIVE DAYTIME SLEEPINESS: ICD-10-CM

## 2020-06-02 DIAGNOSIS — R55 SYNCOPE, UNSPECIFIED SYNCOPE TYPE: ICD-10-CM

## 2020-06-02 DIAGNOSIS — J42 CHRONIC BRONCHITIS, UNSPECIFIED CHRONIC BRONCHITIS TYPE (HCC): ICD-10-CM

## 2020-06-02 NOTE — TELEPHONE ENCOUNTER
Called Katrina back. OPO reordered with added diagnoses codes. Confirmed fax # with Katrina.    Faxed order to [facility] at [phone], received receipt for confirmation.

## 2020-06-02 NOTE — TELEPHONE ENCOUNTER
ANUPAMA Herndon @ [facility] called re: OPO order, dx code is not approved by medicare.    #[phone]  Fax#[phone]

## 2020-06-03 ENCOUNTER — TELEPHONE (OUTPATIENT)
Dept: MEDICAL GROUP | Facility: PHYSICIAN GROUP | Age: 85
End: 2020-06-03

## 2020-06-03 NOTE — TELEPHONE ENCOUNTER
Phone Number Called: [phone]    Call outcome: Did not leave a detailed message. Requested patient to call back.    Message: LVM for Ledy to refax orders as we did not receive them.

## 2020-06-03 NOTE — TELEPHONE ENCOUNTER
VOICEMAIL  1. Caller Name: Ledy Isaacs @ [employer]                             Call Back Number: [phone]    2. Message: Called to check on the status of Speech language therapy order.  Wanted to have the order signed and faxed back.

## 2020-06-04 ENCOUNTER — NON-PROVIDER VISIT (OUTPATIENT)
Dept: CARDIOLOGY | Facility: MEDICAL CENTER | Age: 85
End: 2020-06-04
Payer: MEDICARE

## 2020-06-04 ENCOUNTER — TELEPHONE (OUTPATIENT)
Dept: CARDIOLOGY | Facility: MEDICAL CENTER | Age: 85
End: 2020-06-04

## 2020-06-04 DIAGNOSIS — I47.29 NSVT (NONSUSTAINED VENTRICULAR TACHYCARDIA) (HCC): ICD-10-CM

## 2020-06-04 NOTE — TELEPHONE ENCOUNTER
Patient enrolled in the 14 day  Zio patch program per Honorio Tariq MD. (enrollment under [facility]).  In-Clinic hookup.    >Currently pending EOS.

## 2020-06-15 NOTE — TELEPHONE ENCOUNTER
VOICEMAIL  1. Caller Name: Ledy Isaacs with [facility]                        Call Back Number: [phone]    2. Message: Called to check on status of speech therapy orders.  Stated had faxed twice and are still awaiting signed orders to be returned.

## 2020-06-15 NOTE — TELEPHONE ENCOUNTER
Phone Number Called: [phone]    Call outcome: Left detailed message for patient. Informed to call back with any additional questions.    Message: LVM again stating we have yet to receive the orders for speech therapy and are requesting for them to be faxed.  Provided [facility] Fax, and advised to send them Attn.: [facility].

## 2020-07-01 ENCOUNTER — TELEPHONE (OUTPATIENT)
Dept: CARDIOLOGY | Facility: MEDICAL CENTER | Age: 85
End: 2020-07-01

## 2020-07-01 DIAGNOSIS — I47.20 V-TACH (HCC): ICD-10-CM

## 2020-07-01 PROCEDURE — 0296T PR EXT ECG > 48HR TO 21 DAY RCRD W/CONECT INTL RCRD: CPT | Performed by: INTERNAL MEDICINE

## 2020-07-01 PROCEDURE — 0298T PR EXT ECG > 48HR TO 21 DAY REVIEW AND INTERPRETATN: CPT | Performed by: INTERNAL MEDICINE

## 2020-07-13 ENCOUNTER — TELEPHONE (OUTPATIENT)
Dept: CARDIOLOGY | Facility: MEDICAL CENTER | Age: 85
End: 2020-07-13

## 2020-07-13 NOTE — TELEPHONE ENCOUNTER
TW    Hello, patient calling in regards to her ziopatch results, she will like a call back at [phone]

## 2020-07-14 NOTE — TELEPHONE ENCOUNTER
"Spoke with Rosa, results read by FRANCA on 07/01/20.    Called pt to discuss results. She states the following:    \"Every morning I'm so tired and off balance, if I don't sit down I'm afraid I'm going to fall down.  Sometimes I'm short of breath, later in the day I feel much much better.\"    Pt denies chest pain/pressure or palpitations. She states SOB goes away with rest. She states she sleeps \"deep\" at night so she is unsure if symptoms related to her sleeping or O2. Pt does not wear oxygen at home, she uses an inhaler PRN. She has been trying to get in touch with her PCP however she has been unavailable. Advised a close follow up with PCP.    She is scheduled for 11/30/20 FV with TW. Offered sooner appointment with NP to discuss symptoms, pt agreeable. Schedule to see JS on 08/13/20 at 2:15pm.    ER precautions advised in the mean time, verbalized understanding, appreciative of call back.  "

## 2020-07-24 ENCOUNTER — OFFICE VISIT (OUTPATIENT)
Dept: MEDICAL GROUP | Facility: PHYSICIAN GROUP | Age: 85
End: 2020-07-24
Payer: MEDICARE

## 2020-07-24 ENCOUNTER — HOSPITAL ENCOUNTER (OUTPATIENT)
Dept: LAB | Facility: MEDICAL CENTER | Age: 85
End: 2020-07-24
Attending: FAMILY MEDICINE
Payer: MEDICARE

## 2020-07-24 VITALS
WEIGHT: 142.4 LBS | DIASTOLIC BLOOD PRESSURE: 72 MMHG | RESPIRATION RATE: 16 BRPM | TEMPERATURE: 97 F | BODY MASS INDEX: 24.31 KG/M2 | OXYGEN SATURATION: 97 % | HEIGHT: 64 IN | SYSTOLIC BLOOD PRESSURE: 114 MMHG | HEART RATE: 88 BPM

## 2020-07-24 DIAGNOSIS — D75.89 MACROCYTOSIS: ICD-10-CM

## 2020-07-24 DIAGNOSIS — R73.03 PREDIABETES: ICD-10-CM

## 2020-07-24 DIAGNOSIS — G47.19 EXCESSIVE DAYTIME SLEEPINESS: ICD-10-CM

## 2020-07-24 LAB — FOLATE SERPL-MCNC: 28 NG/ML

## 2020-07-24 PROCEDURE — 99214 OFFICE O/P EST MOD 30 MIN: CPT | Performed by: FAMILY MEDICINE

## 2020-07-24 PROCEDURE — 82746 ASSAY OF FOLIC ACID SERUM: CPT

## 2020-07-24 PROCEDURE — 36415 COLL VENOUS BLD VENIPUNCTURE: CPT

## 2020-07-24 ASSESSMENT — FIBROSIS 4 INDEX: FIB4 SCORE: 2.65

## 2020-07-24 NOTE — PROGRESS NOTES
Subjective:     Chief Complaint   Patient presents with   • Lab Results       HPI:   Thalia presents today to discuss the following.  PCP: DEJA Brown    Prediabetes  This is a chronic problem  The patient completed labs  and was noted to have prediabetes with A1c at 5.7.  This is better than last time at 6  She loves sweets     Macrocytosis  This is a chronic issue  The patient has a hx of this  B12 last checked 10 months   Folate level has not been checked  She is unsure if she has DOMINGO    Excessive daytime sleepiness  This is a chronic issue  The patient has a hx of this  Has not had sleep study  Noted to have macrocytosis      Past Medical History:   Diagnosis Date   • COPD (chronic obstructive pulmonary disease) (HCC)    • GERD (gastroesophageal reflux disease)    • Heart palpitations    • Hyperlipidemia    • Osteoarthritis    • Thyroid disease        Social History     Tobacco Use   • Smoking status: Former Smoker     Packs/day: 1.00     Years: 55.00     Pack years: 55.00     Types: Cigarettes     Last attempt to quit: 2013     Years since quittin.0   • Smokeless tobacco: Never Used   Substance Use Topics   • Alcohol use: Yes     Alcohol/week: 6.0 oz     Types: 10 Glasses of wine per week   • Drug use: No       Current Outpatient Medications Ordered in Epic   Medication Sig Dispense Refill   • levothyroxine (SYNTHROID) 25 MCG Tab TAKE 1 TABLET BY MOUTH EVERY MORNING ON AN EMPTY STOMACH FOR THYROID 90 Tab 0   • escitalopram (LEXAPRO) 10 MG Tab TAKE 1 TABLET BY MOUTH EVERY NIGHT AT BEDTIME FOR DEPRESSION 90 Tab 0   • metoprolol SR (TOPROL XL) 25 MG TABLET SR 24 HR TAKE 1/2 TABLET BY MOUTH EVERY DAY 90 Tab 0   • omeprazole (PRILOSEC) 20 MG delayed-release capsule TAKE 1 CAPSULE BY MOUTH EVERY DAY 90 Cap 3   • ASPIRIN 81 PO Take 81 mg by mouth every day.     • Multiple Vitamins-Minerals (MULTIVITAMIN ADULT PO) Take 1 Tab by mouth every day. Indications: Centrum     • albuterol 108 (90 Base)  "MCG/ACT Aero Soln inhalation aerosol INHALE 2 PUFFS BY MOUTH EVERY 6 HOURS AS NEEDED FOR SHORTNESS OF BREATH 1 Inhaler 6   • erythromycin 5 MG/GM Ointment NEETU THIN LAYER IN OD QHS  0   • ezetimibe (ZETIA) 10 MG Tab Take 1 Tab by mouth every day. FOR CHOLESTEROL/STROKE PREVENTION 90 Tab 3   • atorvastatin (LIPITOR) 20 MG Tab Take 1 Tab by mouth every day. FOR CHOLESTEROL. 90 Tab 2     No current Epic-ordered facility-administered medications on file.        Allergies:  Penicillins    Health Maintenance: Completed    ROS:  Gen: no fevers/chills, no changes in weight  Eyes: no changes in vision  ENT: no sore throat, no hearing loss, no bloody nose  Pulm: no sob, no cough  CV: no chest pain, no palpitations      Objective:     Exam:  /72 (BP Location: Left arm, Patient Position: Sitting, BP Cuff Size: Adult)   Pulse 88   Temp 36.1 °C (97 °F) (Temporal)   Resp 16   Ht 1.626 m (5' 4\")   Wt 64.6 kg (142 lb 6.4 oz)   LMP  (LMP Unknown)   SpO2 97%   BMI 24.44 kg/m²  Body mass index is 24.44 kg/m².    Constitutional: Alert, no distress, well-groomed.  Skin: Warm, dry, good turgor, no rashes in visible areas.  Eye: Equal, round and reactive, conjunctiva clear, lids normal.  ENMT: Lips without lesions, good dentition, moist mucous membranes.  Neck: Trachea midline, no masses, no thyromegaly.  Respiratory: Unlabored respiratory effort, no cough.  MSK: Normal gait, moves all extremities.  Neuro: Grossly non-focal.   Psych: Alert and oriented x3, normal affect and mood.        Assessment & Plan:     89 y.o. female with the following -     1. Prediabetes  This is a chronic condition.  The patient is noted to have a history of prediabetes.  Most recent A1c May 2020 demonstrates improvement at 5.7 when previously was 6.0.  She loves to eat chocolates.  However, she is agreeable to continuing to cut down on these.    2. Macrocytosis  This is a chronic condition.  She is also noted to have a history of macrocytosis which " appears to be stable.  Vitamin B12 level has been normal in the past.  However, I am unable to locate a folate level.  As such, I will order folate level today.  Etiology of her microcytosis could also be underlying obstructive sleep apnea.  As such, she will be referred to a sleep study.    3. Excessive daytime sleepiness  This is a chronic condition.  The patient has reported excessive daytime sleepiness to PCP.  However, she has not completed any sleep studies for this.  I would like her to get evaluated for possible obstructive sleep apnea.  Referral is in place today.  - REFERRAL TO PULMONARY AND SLEEP MEDICINE Sleep Medicine      Return in about 6 months (around 1/24/2021) for with PCP.    Please note that this dictation was created using voice recognition software. I have made every reasonable attempt to correct obvious errors, but I expect that there are errors of grammar and possibly content that I did not discover before finalizing the note.

## 2020-08-04 NOTE — ASSESSMENT & PLAN NOTE
This is a chronic issue  The patient has a hx of this  B12 last checked 10 months   Folate level has not been checked  She is unsure if she has DOMINGO  
This is a chronic issue  The patient has a hx of this  Has not had sleep study  Noted to have macrocytosis  
This is a chronic problem  The patient completed labs 5/20 and was noted to have prediabetes with A1c at 5.7.  This is better than last time at 6  She loves sweets   
04-Aug-2020 19:10

## 2020-08-12 NOTE — PROGRESS NOTES
Chief Complaint   Patient presents with   • Follow-Up       Subjective:   Thalia Horton is a very pleasant 89 y.o. female patient of Dr. Joshua Teresa who presents today regarding her PAD.     Patient was last seen by Dr. Teresa on 2020, she was overall doing well. She had recently had a TIA, it was decided to stop her ASA and start Plavix.     Past medical history significant for hypertension hyperlipidemia and bilateral carotid artery stenosis which has progressed to 70% on one side and 50-69% on the other.    Patient is a DNR/DNI.     Today patient states that she was having episodes of feeling significantly fatigued and off balance in the morning and that her symptoms would improve as the day went on. Occasionally she does have palpitations.     Past Medical History:   Diagnosis Date   • COPD (chronic obstructive pulmonary disease) (HCC)    • GERD (gastroesophageal reflux disease)    • Heart palpitations    • Hyperlipidemia    • Osteoarthritis    • Thyroid disease      Past Surgical History:   Procedure Laterality Date   • HIP REPLACEMENT, TOTAL Left      Family History   Problem Relation Age of Onset   • Stroke Mother 69     Social History     Socioeconomic History   • Marital status:      Spouse name: Not on file   • Number of children: Not on file   • Years of education: Not on file   • Highest education level: Not on file   Occupational History   • Not on file   Social Needs   • Financial resource strain: Not on file   • Food insecurity     Worry: Not on file     Inability: Not on file   • Transportation needs     Medical: Not on file     Non-medical: Not on file   Tobacco Use   • Smoking status: Former Smoker     Packs/day: 1.00     Years: 55.00     Pack years: 55.00     Types: Cigarettes     Quit date: 2013     Years since quittin.0   • Smokeless tobacco: Never Used   Substance and Sexual Activity   • Alcohol use: Yes     Alcohol/week: 6.0 oz     Types: 10 Glasses of  wine per week   • Drug use: No   • Sexual activity: Never   Lifestyle   • Physical activity     Days per week: Not on file     Minutes per session: Not on file   • Stress: Not on file   Relationships   • Social connections     Talks on phone: Not on file     Gets together: Not on file     Attends Muslim service: Not on file     Active member of club or organization: Not on file     Attends meetings of clubs or organizations: Not on file     Relationship status: Not on file   • Intimate partner violence     Fear of current or ex partner: Not on file     Emotionally abused: Not on file     Physically abused: Not on file     Forced sexual activity: Not on file   Other Topics Concern   • Not on file   Social History Narrative   • Not on file     Allergies   Allergen Reactions   • Penicillins Vomiting     Outpatient Encounter Medications as of 8/13/2020   Medication Sig Dispense Refill   • metoprolol SR (TOPROL XL) 25 MG TABLET SR 24 HR Take 1 Tab by mouth every day. 90 Tab 3   • ezetimibe (ZETIA) 10 MG Tab Take 1 Tab by mouth every day. FOR CHOLESTEROL/STROKE PREVENTION 90 Tab 3   • levothyroxine (SYNTHROID) 25 MCG Tab TAKE 1 TABLET BY MOUTH EVERY MORNING ON AN EMPTY STOMACH FOR THYROID 90 Tab 0   • escitalopram (LEXAPRO) 10 MG Tab TAKE 1 TABLET BY MOUTH EVERY NIGHT AT BEDTIME FOR DEPRESSION 90 Tab 0   • omeprazole (PRILOSEC) 20 MG delayed-release capsule TAKE 1 CAPSULE BY MOUTH EVERY DAY 90 Cap 3   • ASPIRIN 81 PO Take 81 mg by mouth every day.     • Multiple Vitamins-Minerals (MULTIVITAMIN ADULT PO) Take 1 Tab by mouth every day. Indications: Centrum     • albuterol 108 (90 Base) MCG/ACT Aero Soln inhalation aerosol INHALE 2 PUFFS BY MOUTH EVERY 6 HOURS AS NEEDED FOR SHORTNESS OF BREATH 1 Inhaler 6   • erythromycin 5 MG/GM Ointment NEETU THIN LAYER IN OD QHS  0   • atorvastatin (LIPITOR) 20 MG Tab Take 1 Tab by mouth every day. FOR CHOLESTEROL. 90 Tab 2   • [DISCONTINUED] metoprolol SR (TOPROL XL) 25 MG TABLET SR 24  "HR TAKE 1/2 TABLET BY MOUTH EVERY DAY (Patient not taking: Reported on 8/13/2020) 90 Tab 0   • [DISCONTINUED] ezetimibe (ZETIA) 10 MG Tab Take 1 Tab by mouth every day. FOR CHOLESTEROL/STROKE PREVENTION 90 Tab 3     No facility-administered encounter medications on file as of 8/13/2020.      Review of Systems   Constitutional: Positive for malaise/fatigue. Negative for weight loss.   Respiratory: Negative for shortness of breath.    Cardiovascular: Positive for palpitations. Negative for chest pain, orthopnea, claudication, leg swelling and PND.   Neurological: Negative for dizziness and weakness.   All other systems reviewed and are negative.       Objective:   /72 (BP Location: Left arm, Patient Position: Sitting, BP Cuff Size: Adult)   Pulse 96   Ht 1.626 m (5' 4\")   Wt 63.6 kg (140 lb 3.2 oz)   LMP  (LMP Unknown)   SpO2 92%   BMI 24.07 kg/m²     Physical Exam   Constitutional: She is oriented to person, place, and time. She appears well-developed and well-nourished. No distress.   HENT:   Head: Normocephalic.   Eyes: EOM are normal.   Neck: No JVD present.   Cardiovascular: Normal rate and regular rhythm.   Murmur heard.  Pulmonary/Chest: Breath sounds normal. No respiratory distress.   Abdominal: Soft. There is no abdominal tenderness.   Musculoskeletal:         General: No edema.   Neurological: She is alert and oriented to person, place, and time.   Skin: Skin is warm and dry.   Psychiatric: She has a normal mood and affect. Her behavior is normal.        Holter Monitor 7/1/2020:  Zio XT study showing predominately sinus rhythm with average of 88 bpm, minimum rate of 187 bpm, maximum rate of 61 bpm. 9 episodes of non sustained ventricular tachycardia, short bigeminy and trigeminy and 7 episodes of atrial tachycardia noted.       Echocardiogram 5/16/2020:  CONCLUSIONS  Normal left ventricular systolic function.  Left ventricular ejection fraction is visually estimated to be 60%.  Indeterminate " diastolic function.  The right ventricle was not well visualized but appeared to be normal in size and function.  Mild to moderate mitral regurgitation.  Moderate mitral annular calcification.  Aortic sclerosis without stenosis.  Normal estimated right atrial pressure.   Unable to estimate pulmonary artery pressure due to an inadequate tricuspid regurgitant jet.     No prior study is available for comparison.       Assessment:     1. Atherosclerosis of both carotid arteries  ezetimibe (ZETIA) 10 MG Tab   2. Mixed hyperlipidemia  ezetimibe (ZETIA) 10 MG Tab   3. Other fatigue  OVERNIGHT PULSE OXIMETRY   4. Nonsustained ventricular tachycardia (HCC)     5. Atrial tachycardia (HCC)     6. Nocturnal hypoxia         Medical Decision Making:  Today's Assessment / Status / Plan:   Nocturnal Hypoxia:  Patient is feeling poorly most mornings. OPO completed on 6/1/2020 reviewed in media tab that showed 426 desaturation episodes with low SpO2 72%.  Patient has an apt for a sleep study in November. Offered nocturnal O2, patient would like to think about it.     VT/AT/PVC's:  -Observed on Zio in July.   -Torpol d/c'd by PCP, will resume Toprol XL 25 mg daily.     Carotid Artery Disease:  -Asymptomatic.   -Continue statin therapy and ASA 81 mg daily.     Patient will follow-up with myself as scheduled below or earlier if needed.  Encouraged patient to contact our office should any questions or concerns arise meantime.  Patient understands and agrees with plan of care.    Future Appointments   Date Time Provider Department Center   9/3/2020 11:00 AM JAVIER Becerra. [facility] None   11/19/2020  2:00 PM Srikanth Dallas M.D. [facility] None   11/30/2020  1:00 PM Joshua Teresa M.D. [facility] None   1/25/2021 11:00 AM DOE Paiz Dr     Collaborating Provider: Dr. Ez Espinoza       Please note that this dictation was created using voice recognition software. I have made every reasonable  attempt to correct obvious errors, but I expect that there are errors of grammar and possibly content I did not discover before finalizing the note.

## 2020-08-13 ENCOUNTER — OFFICE VISIT (OUTPATIENT)
Dept: CARDIOLOGY | Facility: MEDICAL CENTER | Age: 85
End: 2020-08-13
Payer: MEDICARE

## 2020-08-13 VITALS
BODY MASS INDEX: 23.93 KG/M2 | HEART RATE: 96 BPM | WEIGHT: 140.2 LBS | HEIGHT: 64 IN | OXYGEN SATURATION: 92 % | SYSTOLIC BLOOD PRESSURE: 120 MMHG | DIASTOLIC BLOOD PRESSURE: 72 MMHG

## 2020-08-13 DIAGNOSIS — E78.2 MIXED HYPERLIPIDEMIA: ICD-10-CM

## 2020-08-13 DIAGNOSIS — G47.34 NOCTURNAL HYPOXIA: ICD-10-CM

## 2020-08-13 DIAGNOSIS — I47.29 NONSUSTAINED VENTRICULAR TACHYCARDIA (HCC): ICD-10-CM

## 2020-08-13 DIAGNOSIS — I65.23 ATHEROSCLEROSIS OF BOTH CAROTID ARTERIES: ICD-10-CM

## 2020-08-13 DIAGNOSIS — R53.83 OTHER FATIGUE: ICD-10-CM

## 2020-08-13 DIAGNOSIS — I47.19 ATRIAL TACHYCARDIA (HCC): ICD-10-CM

## 2020-08-13 PROCEDURE — 99214 OFFICE O/P EST MOD 30 MIN: CPT | Performed by: NURSE PRACTITIONER

## 2020-08-13 RX ORDER — METOPROLOL SUCCINATE 25 MG/1
25 TABLET, EXTENDED RELEASE ORAL DAILY
Qty: 90 TAB | Refills: 3 | Status: SHIPPED | OUTPATIENT
Start: 2020-08-13 | End: 2021-08-10

## 2020-08-13 RX ORDER — EZETIMIBE 10 MG/1
10 TABLET ORAL
Qty: 90 TAB | Refills: 3 | Status: SHIPPED | OUTPATIENT
Start: 2020-08-13 | End: 2021-08-02

## 2020-08-13 ASSESSMENT — ENCOUNTER SYMPTOMS
WEIGHT LOSS: 0
DIZZINESS: 0
SHORTNESS OF BREATH: 0
WEAKNESS: 0
PND: 0
ORTHOPNEA: 0
CLAUDICATION: 0
PALPITATIONS: 1

## 2020-08-13 ASSESSMENT — FIBROSIS 4 INDEX: FIB4 SCORE: 2.65

## 2020-08-14 ENCOUNTER — TELEPHONE (OUTPATIENT)
Dept: CARDIOLOGY | Facility: MEDICAL CENTER | Age: 85
End: 2020-08-14

## 2020-08-14 NOTE — TELEPHONE ENCOUNTER
Today 8/14/2020 I faxed the Overnight Pulse Oximetry to [facility] at [phone] and fax was completed.

## 2020-08-14 NOTE — TELEPHONE ENCOUNTER
Patient calling back regarding Overnight Pulse Oximetry. She will like a call back at [phone]

## 2020-08-19 DIAGNOSIS — E78.2 MIXED HYPERLIPIDEMIA: ICD-10-CM

## 2020-08-19 RX ORDER — ATORVASTATIN CALCIUM 20 MG/1
TABLET, FILM COATED ORAL
Qty: 90 TAB | Refills: 0 | Status: SHIPPED | OUTPATIENT
Start: 2020-08-19 | End: 2020-11-16

## 2020-08-27 DIAGNOSIS — F33.41 RECURRENT MAJOR DEPRESSIVE DISORDER, IN PARTIAL REMISSION (HCC): ICD-10-CM

## 2020-08-27 DIAGNOSIS — E03.9 ACQUIRED HYPOTHYROIDISM: ICD-10-CM

## 2020-08-27 RX ORDER — ESCITALOPRAM OXALATE 10 MG/1
TABLET ORAL
Qty: 90 TAB | Refills: 0 | Status: SHIPPED | OUTPATIENT
Start: 2020-08-27 | End: 2020-11-30

## 2020-08-27 RX ORDER — LEVOTHYROXINE SODIUM 0.03 MG/1
TABLET ORAL
Qty: 90 TAB | Refills: 0 | Status: SHIPPED | OUTPATIENT
Start: 2020-08-27 | End: 2020-11-30

## 2020-09-04 ENCOUNTER — TELEPHONE (OUTPATIENT)
Dept: CARDIOLOGY | Facility: MEDICAL CENTER | Age: 85
End: 2020-09-04

## 2020-09-04 NOTE — TELEPHONE ENCOUNTER
DOE Becerra R.N.             Abnormal OPO showing significant nocturnal desaturation. She has an apt for sleep study in November per my last note. I am happy to order nocturnal O2 if she would like it.     JS          Attempted to call pt, no answer, LM for her to call back.

## 2020-09-14 NOTE — TELEPHONE ENCOUNTER
Called pt again and got a hold of her. Discuss OPO results per JS and recommendations for nocturnal O2. Pt agreeable. Oxygen order form through [facility] previously reviewed and signed by JS.    Phone number to [facility] and cardiology office given to pt. Pt confirmed her FV with JS on 09/28/20. No other questions or concerns. Verbalized understanding and was appreciative of call.    Faxed order to [facility] at [phone], received receipt for confirmation.

## 2020-11-16 DIAGNOSIS — E78.2 MIXED HYPERLIPIDEMIA: ICD-10-CM

## 2020-11-16 RX ORDER — ATORVASTATIN CALCIUM 20 MG/1
TABLET, FILM COATED ORAL
Qty: 90 TAB | Refills: 0 | Status: SHIPPED | OUTPATIENT
Start: 2020-11-16 | End: 2021-02-16

## 2020-11-17 ENCOUNTER — OFFICE VISIT (OUTPATIENT)
Dept: URGENT CARE | Facility: CLINIC | Age: 85
End: 2020-11-17
Payer: MEDICARE

## 2020-11-17 VITALS
BODY MASS INDEX: 26.98 KG/M2 | HEART RATE: 70 BPM | DIASTOLIC BLOOD PRESSURE: 62 MMHG | SYSTOLIC BLOOD PRESSURE: 112 MMHG | TEMPERATURE: 97 F | RESPIRATION RATE: 16 BRPM | WEIGHT: 158 LBS | OXYGEN SATURATION: 95 % | HEIGHT: 64 IN

## 2020-11-17 DIAGNOSIS — B86 SCABIES: ICD-10-CM

## 2020-11-17 PROCEDURE — 99214 OFFICE O/P EST MOD 30 MIN: CPT | Performed by: NURSE PRACTITIONER

## 2020-11-17 RX ORDER — HYDROXYZINE HYDROCHLORIDE 10 MG/1
10 TABLET, FILM COATED ORAL 3 TIMES DAILY PRN
Qty: 30 TAB | Refills: 0 | Status: CANCELLED | OUTPATIENT
Start: 2020-11-17

## 2020-11-17 ASSESSMENT — ENCOUNTER SYMPTOMS
CONSTIPATION: 0
VOMITING: 0
DIARRHEA: 0
NAUSEA: 0
CHILLS: 0
FEVER: 0
SINUS PAIN: 0
ABDOMINAL PAIN: 0
SORE THROAT: 0
DIZZINESS: 0

## 2020-11-17 ASSESSMENT — LIFESTYLE VARIABLES: SUBSTANCE_ABUSE: 0

## 2020-11-17 ASSESSMENT — FIBROSIS 4 INDEX: FIB4 SCORE: 2.65

## 2020-11-17 NOTE — PROGRESS NOTES
Subjective:      Thalia Horton is a 89 y.o. female who presents with Allergic Reaction (x 3 days hands and body is very itchy. gets worse at night and very red)        Reviewed past medical, surgical and family history. Reviewed prescription and OTC medications with patient in electronic health record today  Allergies: Penicillins            HPI there is a new problem.  Jacques is an 89-year-old female presents with allergic reaction x3 days.  She has intense itching between her fingers of her hands.  She is also itching at her waistband and in her groin.  She believes she is allergic to something.  She said that she is itching so badly she had to get up last night and run cool water over her hands to get them to stop itching.  That treatment helped her to return to sleep.  However it was it was difficult for her to sleep.  She kept waking up itching her hands she lives in a prison community home.  She has never had such an intense itch before.  At times her hands get very red where they are itchy.  Itchiness improves during the day but comes back much worse in the evening when she tries to lay down to go to bed.  Her laundry is done by the care home.  She has no known ill contacts.  She does not know anyone who has an itch like this.  No other aggravating alleviating factors.    Review of Systems   Constitutional: Negative for chills, fever and malaise/fatigue.   HENT: Negative for congestion, sinus pain and sore throat.    Cardiovascular: Negative for chest pain.   Gastrointestinal: Negative for abdominal pain, constipation, diarrhea, nausea and vomiting.   Skin: Positive for itching and rash.   Neurological: Negative for dizziness.   Endo/Heme/Allergies: Negative for environmental allergies.   Psychiatric/Behavioral: Negative for substance abuse.          Objective:     /62 (BP Location: Right arm, Patient Position: Sitting, BP Cuff Size: Adult)   Pulse 70   Temp 36.1 °C (97 °F)   Resp 16   Ht  "1.626 m (5' 4\")   Wt 71.7 kg (158 lb)   LMP  (LMP Unknown)   SpO2 95%   BMI 27.12 kg/m²      Physical Exam  Vitals signs and nursing note reviewed.   Constitutional:       General: She is not in acute distress.     Appearance: Normal appearance. She is well-developed and normal weight.   HENT:      Head: Normocephalic.   Cardiovascular:      Rate and Rhythm: Normal rate and regular rhythm.      Pulses: Normal pulses.      Heart sounds: Normal heart sounds.   Pulmonary:      Effort: Pulmonary effort is normal.   Skin:     General: Skin is warm and dry.      Capillary Refill: Capillary refill takes less than 2 seconds.      Findings: Erythema and rash present.          Neurological:      Mental Status: She is alert and oriented to person, place, and time.   Psychiatric:         Mood and Affect: Mood normal.         Behavior: Behavior normal.         Thought Content: Thought content normal.                 Assessment/Plan:        1. Scabies    - permethrin (ELIMITE) 1 % lotion; Apply lotion all over body. Leave on for 8-14 hours then wash off.  Repeat in 2 weeks if symptoms persist  Dispense: 59 mL; Refill: 1      OTC antihistamine ( claritin/ zyrtec)  of choice. Follow manufactures dosing and safety guidelines.     Educated in infection control practices.     Educated in proper administration of medication(s) ordered today including safety, possible SE, risks, benefits, rationale and alternatives to therapy.     Resume all prior  RX medications. Take as prescribed.     Fu prn new or worsening sx       "

## 2020-11-19 ENCOUNTER — APPOINTMENT (OUTPATIENT)
Dept: SLEEP MEDICINE | Facility: MEDICAL CENTER | Age: 85
End: 2020-11-19
Payer: MEDICARE

## 2020-11-26 DIAGNOSIS — E03.9 ACQUIRED HYPOTHYROIDISM: ICD-10-CM

## 2020-11-26 DIAGNOSIS — F33.41 RECURRENT MAJOR DEPRESSIVE DISORDER, IN PARTIAL REMISSION (HCC): ICD-10-CM

## 2020-11-30 ENCOUNTER — OFFICE VISIT (OUTPATIENT)
Dept: CARDIOLOGY | Facility: MEDICAL CENTER | Age: 85
End: 2020-11-30
Payer: MEDICARE

## 2020-11-30 VITALS
DIASTOLIC BLOOD PRESSURE: 68 MMHG | WEIGHT: 142 LBS | OXYGEN SATURATION: 95 % | HEART RATE: 78 BPM | HEIGHT: 64 IN | SYSTOLIC BLOOD PRESSURE: 122 MMHG | BODY MASS INDEX: 24.24 KG/M2 | RESPIRATION RATE: 16 BRPM

## 2020-11-30 DIAGNOSIS — G45.9 TIA (TRANSIENT ISCHEMIC ATTACK): ICD-10-CM

## 2020-11-30 DIAGNOSIS — I47.19 ATRIAL TACHYCARDIA (HCC): ICD-10-CM

## 2020-11-30 DIAGNOSIS — G47.33 OSA (OBSTRUCTIVE SLEEP APNEA): ICD-10-CM

## 2020-11-30 DIAGNOSIS — I47.29 NSVT (NONSUSTAINED VENTRICULAR TACHYCARDIA) (HCC): ICD-10-CM

## 2020-11-30 DIAGNOSIS — I65.23 ATHEROSCLEROSIS OF BOTH CAROTID ARTERIES: ICD-10-CM

## 2020-11-30 DIAGNOSIS — Z66 DNR (DO NOT RESUSCITATE): ICD-10-CM

## 2020-11-30 PROCEDURE — 99214 OFFICE O/P EST MOD 30 MIN: CPT | Performed by: INTERNAL MEDICINE

## 2020-11-30 RX ORDER — HYDROCODONE BITARTRATE AND ACETAMINOPHEN 5; 325 MG/1; MG/1
TABLET ORAL
COMMUNITY
Start: 2020-09-22 | End: 2021-10-12

## 2020-11-30 RX ORDER — ESCITALOPRAM OXALATE 10 MG/1
TABLET ORAL
Qty: 90 TAB | Refills: 0 | Status: SHIPPED | OUTPATIENT
Start: 2020-11-30 | End: 2021-03-01

## 2020-11-30 RX ORDER — LEVOTHYROXINE SODIUM 0.03 MG/1
TABLET ORAL
Qty: 90 TAB | Refills: 0 | Status: SHIPPED | OUTPATIENT
Start: 2020-11-30 | End: 2021-01-25

## 2020-11-30 RX ORDER — PREDNISOLONE ACETATE 10 MG/ML
SUSPENSION/ DROPS OPHTHALMIC
COMMUNITY
Start: 2020-09-29 | End: 2021-01-25

## 2020-11-30 RX ORDER — OFLOXACIN 3 MG/ML
SOLUTION/ DROPS OPHTHALMIC
COMMUNITY
Start: 2020-09-21 | End: 2021-01-25

## 2020-11-30 RX ORDER — PERMETHRIN 50 MG/G
CREAM TOPICAL
COMMUNITY
Start: 2020-11-17 | End: 2021-01-25

## 2020-11-30 ASSESSMENT — FIBROSIS 4 INDEX: FIB4 SCORE: 2.65

## 2020-11-30 NOTE — PROGRESS NOTES
Chief Complaint   Patient presents with   • COPD   • Transient Ischemic Attack   • Hyperlipidemia   • HTN (Controlled)       Subjective:   Thalia Horton is a 89 y.o. female who presents today for follow-up regarding PAD.  Has a history of hypertension hyperlipidemia and bilateral carotid artery stenosis which has progressed to 70% on one side and 50-69% on the other.  She developed TIA symptoms characterized by word finding difficulty and expressive aphasia.  This has resolved.  She was taking aspirin at the time.  She is now followed by vascular surgery.      In the interim she has had monitor revealing nonsustained VT and had her beta-blocker restarted by our APRN.  She has multiple noncardiac issues that she discusses today in a circuitous manner having difficulty staying on topic.    Past Medical History:   Diagnosis Date   • COPD (chronic obstructive pulmonary disease) (HCC)    • GERD (gastroesophageal reflux disease)    • Heart palpitations    • Hyperlipidemia    • Osteoarthritis    • Thyroid disease      Past Surgical History:   Procedure Laterality Date   • HIP REPLACEMENT, TOTAL Left      Family History   Problem Relation Age of Onset   • Stroke Mother 69     Social History     Socioeconomic History   • Marital status:      Spouse name: Not on file   • Number of children: Not on file   • Years of education: Not on file   • Highest education level: Not on file   Occupational History   • Not on file   Social Needs   • Financial resource strain: Not on file   • Food insecurity     Worry: Not on file     Inability: Not on file   • Transportation needs     Medical: Not on file     Non-medical: Not on file   Tobacco Use   • Smoking status: Former Smoker     Packs/day: 1.00     Years: 55.00     Pack years: 55.00     Types: Cigarettes     Quit date: 2013     Years since quittin.3   • Smokeless tobacco: Never Used   Substance and Sexual Activity   • Alcohol use: Yes     Alcohol/week: 6.0 oz      Types: 10 Glasses of wine per week   • Drug use: No   • Sexual activity: Never   Lifestyle   • Physical activity     Days per week: Not on file     Minutes per session: Not on file   • Stress: Not on file   Relationships   • Social connections     Talks on phone: Not on file     Gets together: Not on file     Attends Anabaptism service: Not on file     Active member of club or organization: Not on file     Attends meetings of clubs or organizations: Not on file     Relationship status: Not on file   • Intimate partner violence     Fear of current or ex partner: Not on file     Emotionally abused: Not on file     Physically abused: Not on file     Forced sexual activity: Not on file   Other Topics Concern   • Not on file   Social History Narrative   • Not on file     Allergies   Allergen Reactions   • Penicillins Vomiting     Outpatient Encounter Medications as of 11/30/2020   Medication Sig Dispense Refill   • atorvastatin (LIPITOR) 20 MG Tab TAKE 1 TABLET BY MOUTH EVERY DAY FOR CHOLESTEROL 90 Tab 0   • [DISCONTINUED] escitalopram (LEXAPRO) 10 MG Tab TAKE 1 TABLET BY MOUTH EVERY NIGHT AT BEDTIME FOR DEPRESSION 90 Tab 0   • metoprolol SR (TOPROL XL) 25 MG TABLET SR 24 HR Take 1 Tab by mouth every day. 90 Tab 3   • ezetimibe (ZETIA) 10 MG Tab Take 1 Tab by mouth every day. FOR CHOLESTEROL/STROKE PREVENTION 90 Tab 3   • omeprazole (PRILOSEC) 20 MG delayed-release capsule TAKE 1 CAPSULE BY MOUTH EVERY DAY 90 Cap 3   • ASPIRIN 81 PO Take 81 mg by mouth every day.     • Multiple Vitamins-Minerals (MULTIVITAMIN ADULT PO) Take 1 Tab by mouth every day. Indications: Centrum     • albuterol 108 (90 Base) MCG/ACT Aero Soln inhalation aerosol INHALE 2 PUFFS BY MOUTH EVERY 6 HOURS AS NEEDED FOR SHORTNESS OF BREATH 1 Inhaler 6   • prednisoLONE acetate (PRED FORTE) 1 % Suspension SHAKE LQ AND INT 1 GTT IN OD QID     • permethrin (ELIMITE) 5 % Cream APPLY FROM HEAD TO TOE AND LEAVE ON FOR 8 TO 14  H THEN WASH  OFF. REPEAT IN 2 WEEKS  "IF SYMPTOMS PERSIST     • ofloxacin (OCUFLOX) 0.3 % Solution INSTILL 1 GTT POST OPERATIVE EYE QID     • HYDROcodone-acetaminophen (NORCO) 5-325 MG Tab per tablet TK 1 T PO  Q 4 H FOR 3 DAYS     • permethrin (ELIMITE) 1 % lotion Apply lotion all over body. Leave on for 8-14 hours then wash off.  Repeat in 2 weeks if symptoms persist (Patient not taking: Reported on 11/30/2020) 59 mL 1   • [DISCONTINUED] levothyroxine (SYNTHROID) 25 MCG Tab TAKE 1 TABLET BY MOUTH EVERY MORNING ON AN EMPTY STOMACH FOR THYROID (Patient not taking: Reported on 11/30/2020) 90 Tab 0   • erythromycin 5 MG/GM Ointment NEETU THIN LAYER IN OD QHS  0     No facility-administered encounter medications on file as of 11/30/2020.      Review of Systems   All other systems reviewed and are negative.       Objective:   /68 (BP Location: Left arm, Patient Position: Sitting, BP Cuff Size: Adult)   Pulse 78   Resp 16   Ht 1.626 m (5' 4\")   Wt 64.4 kg (142 lb)   LMP  (LMP Unknown)   SpO2 95%   BMI 24.37 kg/m²     Physical Exam   Constitutional: She is oriented to person, place, and time. She appears well-developed and well-nourished. No distress.   HENT:   Head: Normocephalic and atraumatic.   Right Ear: External ear normal.   Left Ear: External ear normal.   Mouth/Throat: No oropharyngeal exudate.   Eyes: Pupils are equal, round, and reactive to light. Conjunctivae and EOM are normal. Right eye exhibits no discharge. Left eye exhibits no discharge. No scleral icterus.   Neck: Normal range of motion. Neck supple. No JVD present. No tracheal deviation present. No thyromegaly present.   Cardiovascular: Normal rate, regular rhythm, S1 normal, S2 normal and intact distal pulses. PMI is not displaced. Exam reveals no gallop, no S3, no S4 and no friction rub.   Murmur heard.  Pulses:       Carotid pulses are 2+ on the right side and 2+ on the left side.       Radial pulses are 2+ on the right side and 2+ on the left side.        Popliteal pulses are " 2+ on the right side and 2+ on the left side.        Dorsalis pedis pulses are 2+ on the right side and 2+ on the left side.        Posterior tibial pulses are 2+ on the right side and 2+ on the left side.   Pulmonary/Chest: Effort normal and breath sounds normal. No respiratory distress. She has no wheezes. She has no rales. She exhibits no tenderness.   Abdominal: Soft. Bowel sounds are normal. She exhibits no distension. There is no abdominal tenderness.   Musculoskeletal: Normal range of motion.         General: No tenderness or edema.   Neurological: She is alert and oriented to person, place, and time. No cranial nerve deficit (Cranial nerves II through XII grossly intact).   Skin: Skin is warm and dry. No rash noted. She is not diaphoretic. No erythema.   Psychiatric: She has a normal mood and affect. Her behavior is normal. Judgment and thought content normal.   Vitals reviewed.    LABS:  Lab Results   Component Value Date/Time    CHOLSTRLTOT 201 (H) 09/06/2018 06:51 PM     (H) 09/06/2018 06:51 PM    HDL 55 09/06/2018 06:51 PM    TRIGLYCERIDE 199 (H) 09/06/2018 06:51 PM       Lab Results   Component Value Date/Time    WBC 5.9 05/16/2020 02:44 AM    RBC 4.04 (L) 05/16/2020 02:44 AM    HEMOGLOBIN 13.2 05/16/2020 02:44 AM    HEMATOCRIT 39.8 05/16/2020 02:44 AM    MCV 98.5 (H) 05/16/2020 02:44 AM    NEUTSPOLYS 48.90 05/16/2020 02:44 AM    LYMPHOCYTES 33.60 05/16/2020 02:44 AM    MONOCYTES 15.00 (H) 05/16/2020 02:44 AM    EOSINOPHILS 1.50 05/16/2020 02:44 AM    BASOPHILS 0.70 05/16/2020 02:44 AM     Lab Results   Component Value Date/Time    SODIUM 140 05/16/2020 02:44 AM    POTASSIUM 3.7 05/16/2020 02:44 AM    CHLORIDE 102 05/16/2020 02:44 AM    CO2 26 05/16/2020 02:44 AM    GLUCOSE 116 (H) 05/16/2020 02:44 AM    BUN 17 05/16/2020 02:44 AM    CREATININE 0.52 05/16/2020 02:44 AM     Lab Results   Component Value Date    HBA1C 5.7 (H) 05/16/2020      Lab Results   Component Value Date/Time    ALKPHOSPHAT 66  05/15/2020 11:24 AM    ASTSGOT 24 05/15/2020 11:24 AM    ALTSGPT 15 05/15/2020 11:24 AM    TBILIRUBIN 0.5 05/15/2020 11:24 AM      No results found for: BNPBTYPENAT   No results found for: TSH  No results found for: PROTHROMBTM, INR     EKG (1/22/2019):  I have personally reviewed the EKG this visit and discussed with the patient.  Sinus rhythm, left axis deviation, poor R wave progression.    ECHO CONCLUSIONS (5/15/2020):  Normal left ventricular systolic function.  Left ventricular ejection fraction is visually estimated to be 60%.  Indeterminate diastolic function.  The right ventricle was not well visualized but appeared to be normal   in size and function.  Mild to moderate mitral regurgitation.  Moderate mitral annular calcification.  Aortic sclerosis without stenosis.  Normal estimated right atrial pressure.   Unable to estimate pulmonary artery pressure due to an inadequate   tricuspid regurgitant jet.     No prior study is available for comparison.       Assessment:     1. Atherosclerosis of both carotid arteries     2. Atrial tachycardia (HCC)     3. NSVT (nonsustained ventricular tachycardia) (HCC)     4. TIA (transient ischemic attack)     5. DNR (do not resuscitate)     6. DOMINGO (obstructive sleep apnea)  REFERRAL TO PULMONARY AND SLEEP MEDICINE       Medical Decision Making:  Today's Assessment / Status / Plan:     Doing well.  Asymptomatic NSVT.  DNR/DNI and would like to stay this way.  She is a candidate for any cardiovascular interventions due to this and her preferences.  She has no need for any cardiac interventional therapy at this time either.  She has no obvious cardiac symptoms.  Recommend ongoing medical management of her atherosclerosis with goal LDL less than 70, and most of her concerns and perseveration focus mostly on her nocturnal desaturations and the oxygen that was ordered by our nurse practitioner.  She was referred to pulmonary medicine but has not made that visit.  We discussed  that simple oxygen may not be the appropriate treatment as she likely has sleep disordered breathing and further assessment by an expert would be recommended.  I have referred her to pulmonary medicine.    Follow-up with DEJA in cardiology in 1 year.

## 2021-01-11 DIAGNOSIS — Z23 NEED FOR VACCINATION: ICD-10-CM

## 2021-01-19 ENCOUNTER — TELEPHONE (OUTPATIENT)
Dept: MEDICAL GROUP | Facility: PHYSICIAN GROUP | Age: 86
End: 2021-01-19

## 2021-01-19 NOTE — TELEPHONE ENCOUNTER
ESTABLISHED PATIENT PRE-VISIT PLANNING     Patient was NOT contacted to complete PVP.     Note: Patient will not be contacted if there is no indication to call.     1.  Reviewed notes from the last few office visits within the medical group: Yes    2.  If any orders were placed at last visit or intended to be done for this visit (i.e. 6 mos follow-up), do we have Results/Consult Notes?         •  Labs - Labs were not ordered at last office visit.  Note: If patient appointment is for lab review and patient did not complete labs, check with provider if OK to reschedule patient until labs completed.       •  Imaging - Imaging was not ordered at last office visit.       •  Referrals - Referral ordered, patient has NOT been seen.    3. Is this appointment scheduled as a Hospital Follow-Up? No    4.  Immunizations were updated in Epic using Reconcile Outside Information activity? No    5.  Patient is due for the following Health Maintenance Topics:   Health Maintenance Due   Topic Date Due   • Annual Wellness Visit  02/08/1931   • Annual Pulmonary Function Test / Spirometry  02/08/1937   • COVID-19 Vaccine (1 of 2) 02/08/1947   • IMM DTaP/Tdap/Td Vaccine (1 - Tdap) 02/08/1950   • IMM ZOSTER VACCINES (1 of 2) 02/08/1981   • BONE DENSITY  02/08/1996   • IMM PNEUMOCOCCAL VACCINE: 65+ Years (1 of 1 - PPSV23) 02/08/1996   • IMM INFLUENZA (1) 09/01/2020       - Patient plans to schedule appointment for Annual Wellness Visit (AWV), Dexa Scan and Immunizations: FLU, PNEUMOVAX (PPSV23), PREVNAR (PCV13) , TDAP and SHINGRIX (Shingles).    6.  AHA (Pulse8) form printed for Provider? N/A

## 2021-01-25 ENCOUNTER — OFFICE VISIT (OUTPATIENT)
Dept: MEDICAL GROUP | Facility: PHYSICIAN GROUP | Age: 86
End: 2021-01-25
Payer: MEDICARE

## 2021-01-25 VITALS
BODY MASS INDEX: 24.72 KG/M2 | SYSTOLIC BLOOD PRESSURE: 120 MMHG | WEIGHT: 144.8 LBS | RESPIRATION RATE: 28 BRPM | HEART RATE: 75 BPM | OXYGEN SATURATION: 95 % | TEMPERATURE: 97 F | DIASTOLIC BLOOD PRESSURE: 68 MMHG | HEIGHT: 64 IN

## 2021-01-25 DIAGNOSIS — L98.9 NON-HEALING SKIN LESION: ICD-10-CM

## 2021-01-25 DIAGNOSIS — G47.19 EXCESSIVE DAYTIME SLEEPINESS: ICD-10-CM

## 2021-01-25 DIAGNOSIS — I65.23 ATHEROSCLEROSIS OF BOTH CAROTID ARTERIES: ICD-10-CM

## 2021-01-25 DIAGNOSIS — I10 ESSENTIAL HYPERTENSION: ICD-10-CM

## 2021-01-25 DIAGNOSIS — E03.9 ACQUIRED HYPOTHYROIDISM: ICD-10-CM

## 2021-01-25 DIAGNOSIS — K21.9 GASTROESOPHAGEAL REFLUX DISEASE WITHOUT ESOPHAGITIS: ICD-10-CM

## 2021-01-25 DIAGNOSIS — E78.2 MIXED HYPERLIPIDEMIA: ICD-10-CM

## 2021-01-25 DIAGNOSIS — G47.34 NOCTURNAL HYPOXIA: ICD-10-CM

## 2021-01-25 DIAGNOSIS — J42 CHRONIC BRONCHITIS, UNSPECIFIED CHRONIC BRONCHITIS TYPE (HCC): ICD-10-CM

## 2021-01-25 DIAGNOSIS — R73.03 PREDIABETES: ICD-10-CM

## 2021-01-25 DIAGNOSIS — G31.84 MILD COGNITIVE IMPAIRMENT: ICD-10-CM

## 2021-01-25 DIAGNOSIS — R26.89 BALANCE PROBLEM: ICD-10-CM

## 2021-01-25 PROBLEM — R55 SYNCOPE: Status: RESOLVED | Noted: 2020-05-20 | Resolved: 2021-01-25

## 2021-01-25 PROBLEM — A08.4 VIRAL GASTROENTERITIS: Status: RESOLVED | Noted: 2019-06-27 | Resolved: 2021-01-25

## 2021-01-25 PROBLEM — D75.89 MACROCYTOSIS: Status: RESOLVED | Noted: 2020-07-24 | Resolved: 2021-01-25

## 2021-01-25 PROBLEM — R60.0 BILATERAL LOWER EXTREMITY EDEMA: Status: RESOLVED | Noted: 2018-07-19 | Resolved: 2021-01-25

## 2021-01-25 PROCEDURE — 99214 OFFICE O/P EST MOD 30 MIN: CPT | Performed by: NURSE PRACTITIONER

## 2021-01-25 RX ORDER — LANOLIN ALCOHOL/MO/W.PET/CERES
1000 CREAM (GRAM) TOPICAL DAILY
COMMUNITY

## 2021-01-25 RX ORDER — ACETAMINOPHEN 160 MG
2000 TABLET,DISINTEGRATING ORAL DAILY
COMMUNITY

## 2021-01-25 ASSESSMENT — PATIENT HEALTH QUESTIONNAIRE - PHQ9: CLINICAL INTERPRETATION OF PHQ2 SCORE: 0

## 2021-01-25 ASSESSMENT — FIBROSIS 4 INDEX: FIB4 SCORE: 2.65

## 2021-01-25 NOTE — ASSESSMENT & PLAN NOTE
In nature.  Patient states that overall she has been feeling well and has not had as many issues with her balance.  States walker has helped significantly as well as the PT. patient states that she has not had any recent falls or any recent near falls.

## 2021-01-25 NOTE — ASSESSMENT & PLAN NOTE
This is a chronic problem.  Patient is using nightly oxygen.  Dates that she did not feel it was making any difference as as such she stopped the oxygen.  This that she did not complete sleep study is recommended.

## 2021-01-26 NOTE — ASSESSMENT & PLAN NOTE
TSH is within normal limits.  Patient states that she has been having some increased fatigue but overall she is feeling well as such plan is to continue current medication.  Patient denies any palpitations, cold intolerance, heat intolerance, constipation or diarrhea.

## 2021-01-26 NOTE — ASSESSMENT & PLAN NOTE
Chronic in nature.  Stable.  Patient states that overall she is feeling well and has not had any increased shortness of breath recently.  States that she was using overnight oxygen but states that she did not feel it is helping and as such she has stopped it.  States that inhalers are working well.

## 2021-01-26 NOTE — ASSESSMENT & PLAN NOTE
Chronic in nature.  Patient continues to follow with Dr. Teresa regularly.  Denies any chest pain, other concerns.

## 2021-01-26 NOTE — ASSESSMENT & PLAN NOTE
Chronic in nature.  Stable on omeprazole.  Counseled patient regarding possibility of switching to Pepcid discussed with her that we did try to switch her from omeprazole to Pepcid approximately 2 years ago and that at that time she developed worsening symptoms on the Pepcid including frequent burping.  Discussed with patient that I would be willing to do a trial of Pepcid but that I do want to make sure that her acid reflux is well controlled.  States she will think about it.

## 2021-01-26 NOTE — ASSESSMENT & PLAN NOTE
Chronic in nature.  Stable blood pressure today is 120/68.  Recount of respirations during visit indicates respirations of 16.  Patient denies chest pain, palpitations, dizziness, blurry vision.

## 2021-01-26 NOTE — PROGRESS NOTES
Chief Complaint   Patient presents with   • Follow-Up     Chronic conditions, Med management, and sleep study        HISTORY OF PRESENT ILLNESS: Patient is a 89 y.o. female established patient who presents today to ricarda multiple issues.    Nocturnal hypoxia  This is a chronic problem.  Patient is using nightly oxygen.  Dates that she did not feel it was making any difference as as such she stopped the oxygen.  This that she did not complete sleep study is recommended.    Balance problem  In nature.  Patient states that overall she has been feeling well and has not had as many issues with her balance.  States walker has helped significantly as well as the PT. patient states that she has not had any recent falls or any recent near falls.    Acquired hypothyroidism  TSH is within normal limits.  Patient states that she has been having some increased fatigue but overall she is feeling well as such plan is to continue current medication.  Patient denies any palpitations, cold intolerance, heat intolerance, constipation or diarrhea.    Atherosclerosis of both carotid arteries  Chronic in nature.  Patient continues to follow with Dr. Teresa regularly.  Denies any chest pain, other concerns.    COPD (chronic obstructive pulmonary disease) (HCC)  Chronic in nature.  Stable.  Patient states that overall she is feeling well and has not had any increased shortness of breath recently.  States that she was using overnight oxygen but states that she did not feel it is helping and as such she has stopped it.  States that inhalers are working well.    Essential hypertension  Chronic in nature.  Stable blood pressure today is 120/68.  Recount of respirations during visit indicates respirations of 16.  Patient denies chest pain, palpitations, dizziness, blurry vision.    Excessive daytime sleepiness  Chronic in nature.  Does have some noted changes on her CBC, continues to have fatigue during the day.  Related to overnight hypoxia  patient has been recommended to follow-up with a complete sleep study.  Patient had an appointment in November but canceled referral information is provided today and patient is recommended to follow-up with pulmonology for sleep study.    Gastroesophageal reflux disease without esophagitis  Chronic in nature.  Stable on omeprazole.  Counseled patient regarding possibility of switching to Pepcid discussed with her that we did try to switch her from omeprazole to Pepcid approximately 2 years ago and that at that time she developed worsening symptoms on the Pepcid including frequent burping.  Discussed with patient that I would be willing to do a trial of Pepcid but that I do want to make sure that her acid reflux is well controlled.  States she will think about it.    Mild cognitive impairment  Chronic in nature.  Stable.  Patient continues to have mild forgetfulness.  Declines Mini-Mental status exam today.  Patient states that she reads frequently, states that she feels safe and that she has the assistance that she needs.  Discussed with patient possibility of neurology.    Prediabetes  Chronic in nature.  At last visit in July A1c was improved, labs are ordered for update.      Patient Active Problem List    Diagnosis Date Noted   • NSVT (nonsustained ventricular tachycardia) ([facility])      Priority: High   • Fall 05/08/2020     Priority: High   • COPD (chronic obstructive pulmonary disease) ([facility]) 07/19/2018     Priority: High   • Acquired hypothyroidism 07/19/2018     Priority: Low   • Nonsustained ventricular tachycardia (HCC) 08/13/2020   • Atrial tachycardia (HCC) 08/13/2020   • Nocturnal hypoxia 08/13/2020   • Prediabetes 07/24/2020   • DNR (do not resuscitate) 05/28/2020   • Advance care planning 05/16/2020   • Excessive daytime sleepiness 04/29/2020   • Chronic pain of both shoulders 04/09/2020   • Balance problem 04/09/2020   • Essential hypertension 03/26/2020   • Neck pain 01/24/2020   • Mild cognitive  impairment 2019   • TIA (transient ischemic attack) 2019   • History of angina pectoris 2018   • Osteoarthritis of multiple joints 2018   • Mixed hyperlipidemia 2018   • Atherosclerosis of both carotid arteries 2018   • Gastroesophageal reflux disease without esophagitis 2018       Allergies:Penicillins    Current Outpatient Medications   Medication Sig Dispense Refill   • Cholecalciferol (VITAMIN D3) 2000 UNIT Cap Take 1 Cap by mouth every day.     • Cyanocobalamin (VITAMIN B-12) 1000 MCG Tab Take 1,000 mcg by mouth every day.     • escitalopram (LEXAPRO) 10 MG Tab TAKE 1 TABLET BY MOUTH EVERY NIGHT AT BEDTIME FOR DEPRESSION 90 Tab 0   • HYDROcodone-acetaminophen (NORCO) 5-325 MG Tab per tablet TK 1 T PO  Q 4 H FOR 3 DAYS     • atorvastatin (LIPITOR) 20 MG Tab TAKE 1 TABLET BY MOUTH EVERY DAY FOR CHOLESTEROL 90 Tab 0   • metoprolol SR (TOPROL XL) 25 MG TABLET SR 24 HR Take 1 Tab by mouth every day. 90 Tab 3   • ezetimibe (ZETIA) 10 MG Tab Take 1 Tab by mouth every day. FOR CHOLESTEROL/STROKE PREVENTION 90 Tab 3   • omeprazole (PRILOSEC) 20 MG delayed-release capsule TAKE 1 CAPSULE BY MOUTH EVERY DAY 90 Cap 3   • ASPIRIN 81 PO Take 81 mg by mouth every day.     • Multiple Vitamins-Minerals (MULTIVITAMIN ADULT PO) Take 1 Tab by mouth every day. Indications: Centrum     • albuterol 108 (90 Base) MCG/ACT Aero Soln inhalation aerosol INHALE 2 PUFFS BY MOUTH EVERY 6 HOURS AS NEEDED FOR SHORTNESS OF BREATH 1 Inhaler 6     No current facility-administered medications for this visit.        Social History     Tobacco Use   • Smoking status: Former Smoker     Packs/day: 1.00     Years: 55.00     Pack years: 55.00     Types: Cigarettes     Quit date: 2013     Years since quittin.5   • Smokeless tobacco: Never Used   Substance Use Topics   • Alcohol use: Yes     Alcohol/week: 6.0 oz     Types: 10 Glasses of wine per week   • Drug use: No       Family Status   Relation Name  "Status   • Mo     • Son  Alive     Family History   Problem Relation Age of Onset   • Stroke Mother 69       Review of Systems:   Constitutional:  Negative for fever, chills, weight loss and malaise/fatigue.   HEENT:  Negative for ear pain, nosebleeds, congestion, sore throat and neck pain.    Eyes:  Negative for blurred vision.   Respiratory:  Negative for cough, sputum production, shortness of breath and wheezing.    Cardiovascular:  Negative for chest pain, palpitations, orthopnea and leg swelling.   Gastrointestinal:  Negative for heartburn, nausea, vomiting and abdominal pain.   Genitourinary:  Negative for dysuria, urgency and frequency.   Musculoskeletal:  Negative for myalgias, back pain and joint pain.   Skin:  Negative for rash and itching.   Neurological:  Negative for dizziness, tingling, tremors, sensory change, focal weakness and headaches.   All other systems reviewed and are negative except as in HPI.    Exam:  /68 (BP Location: Left arm, Patient Position: Sitting, BP Cuff Size: Adult)   Pulse 75   Temp 36.1 °C (97 °F) (Temporal)   Resp (!) 28   Ht 1.626 m (5' 4\")   Wt 65.7 kg (144 lb 12.8 oz)   SpO2 95%   General:  Normal appearing. No distress.  HEENT:  Normocephalic. Eyes conjunctiva clear lids without ptosis, pupils equal and reactive to light accommodation, ears normal shape and contour, canals are clear bilaterally, tympanic membranes are benign, nasal mucosa benign, oropharynx is without erythema, edema or exudates.   Neck:  Supple without JVD or bruit. Thyroid is not enlarged.  Pulmonary:  Clear to ausculation.  Normal effort. No rales, ronchi, or wheezing.  Cardiovascular:  Regular rate and rhythm without murmur. Carotid and radial pulses are intact and equal bilaterally.  Abdomen:  Soft, nontender, nondistended. Normal bowel sounds. Liver and spleen are not palpable  Neurologic:  Grossly nonfocal  Lymph:  No cervical, supraclavicular or axillary lymph nodes are " palpable  Skin:  Warm and dry.  No obvious lesions.  Musculoskeletal:  Normal gait. No extremity cyanosis, clubbing, or edema.  Psych:  Normal mood and affect. Alert and oriented x3. Judgment and insight is normal.      PLAN:    1. Excessive daytime sleepiness  Recommended patient follow-up for sleep study    2. Chronic bronchitis, unspecified chronic bronchitis type (HCC)  Continue current plan of care and medications    3. Acquired hypothyroidism  Continue current plan of care medications    4. Nocturnal hypoxia  Recommended outpatient follow-up for sleep study discussed that the night oximetry indicated benefit for continued oxygen.    5. Balance problem  We will monitor    6. Essential hypertension  Continue current plan of care  - Comp Metabolic Panel; Future  - Lipid Profile; Future    7. Mild cognitive impairment  Discussed possible follow-up with neurology    8. Mixed hyperlipidemia  Continue monitoring    9. Prediabetes  Update labs  - HEMOGLOBIN A1C; Future    10. Non-healing skin lesion  Patient has a small red irritated open lesion on her left upper earlobe states it has been there for months without healing, referral to dermatology as this likely needs biopsy  - REFERRAL TO DERMATOLOGY    11. Atherosclerosis of both carotid arteries  Continue follow-up with cardiology    12. Gastroesophageal reflux disease without esophagitis  Continue current medication    Follow-up in 3 months to see if patient has followed through with appointments that are recommended.  Please note that this dictation was created using voice recognition software. I have made every reasonable attempt to correct obvious errors, but I expect that there are errors of grammar and possibly content that I did not discover before finalizing the note.    Assessment/Plan:  1. Excessive daytime sleepiness     2. Chronic bronchitis, unspecified chronic bronchitis type (HCC)     3. Acquired hypothyroidism     4. Nocturnal hypoxia     5. Balance  problem     6. Essential hypertension  Comp Metabolic Panel    Lipid Profile   7. Mild cognitive impairment     8. Mixed hyperlipidemia     9. Prediabetes  HEMOGLOBIN A1C   10. Non-healing skin lesion  REFERRAL TO DERMATOLOGY   11. Atherosclerosis of both carotid arteries     12. Gastroesophageal reflux disease without esophagitis            I have placed the below orders and discussed them with an approved delegating provider. The MA is performing the below orders under the direction of Dr. Ramey.

## 2021-01-26 NOTE — ASSESSMENT & PLAN NOTE
Chronic in nature.  Stable.  Patient continues to have mild forgetfulness.  Declines Mini-Mental status exam today.  Patient states that she reads frequently, states that she feels safe and that she has the assistance that she needs.  Discussed with patient possibility of neurology.

## 2021-01-26 NOTE — ASSESSMENT & PLAN NOTE
Chronic in nature.  Does have some noted changes on her CBC, continues to have fatigue during the day.  Related to overnight hypoxia patient has been recommended to follow-up with a complete sleep study.  Patient had an appointment in November but canceled referral information is provided today and patient is recommended to follow-up with pulmonology for sleep study.

## 2021-02-10 ENCOUNTER — OFFICE VISIT (OUTPATIENT)
Dept: CARDIOLOGY | Facility: MEDICAL CENTER | Age: 86
End: 2021-02-10
Payer: MEDICARE

## 2021-02-10 VITALS
HEIGHT: 64 IN | HEART RATE: 70 BPM | BODY MASS INDEX: 24.14 KG/M2 | DIASTOLIC BLOOD PRESSURE: 70 MMHG | RESPIRATION RATE: 14 BRPM | WEIGHT: 141.4 LBS | SYSTOLIC BLOOD PRESSURE: 138 MMHG | OXYGEN SATURATION: 95 %

## 2021-02-10 DIAGNOSIS — G47.34 NOCTURNAL HYPOXIA: ICD-10-CM

## 2021-02-10 DIAGNOSIS — I65.23 ATHEROSCLEROSIS OF BOTH CAROTID ARTERIES: ICD-10-CM

## 2021-02-10 DIAGNOSIS — G45.9 TIA (TRANSIENT ISCHEMIC ATTACK): ICD-10-CM

## 2021-02-10 DIAGNOSIS — R07.89 OTHER CHEST PAIN: ICD-10-CM

## 2021-02-10 PROCEDURE — 99213 OFFICE O/P EST LOW 20 MIN: CPT | Performed by: INTERNAL MEDICINE

## 2021-02-10 ASSESSMENT — FIBROSIS 4 INDEX: FIB4 SCORE: 2.68

## 2021-02-10 NOTE — PROGRESS NOTES
Chief Complaint   Patient presents with   • Aortic Atherosclerosis     F/V Dx: Atherosclerosis of both carotid arteries   • Ventricular Tachycardia     F/V Dx: Nonsustained ventricular tachycardia (HCC)       Subjective:   Thalia Horton is a 89y.o. female who presents today for follow-up regarding PAD.  Has a history of hypertension hyperlipidemia and bilateral carotid artery stenosis which has progressed to 70% on one side and 50-69% on the other.  She developed TIA symptoms characterized by word finding difficulty and expressive aphasia.  This has resolved.  She was taking aspirin at the time.  She is now followed by vascular surgery.  Has had monitor revealing nonsustained VT and had her beta-blocker restarted by our APRN.      She returns early from her scheduled visit for cramping epigastric and left upper quadrant abdominal pain.  It occurred once or twice and has never recurred.  She is concerned this represents heart disease.    Past Medical History:   Diagnosis Date   • COPD (chronic obstructive pulmonary disease) (HCC)    • GERD (gastroesophageal reflux disease)    • Heart palpitations    • Hyperlipidemia    • Osteoarthritis    • Thyroid disease      Past Surgical History:   Procedure Laterality Date   • HIP REPLACEMENT, TOTAL Left      Family History   Problem Relation Age of Onset   • Stroke Mother 69     Social History     Socioeconomic History   • Marital status:      Spouse name: Not on file   • Number of children: Not on file   • Years of education: Not on file   • Highest education level: Not on file   Occupational History   • Not on file   Tobacco Use   • Smoking status: Former Smoker     Packs/day: 1.00     Years: 55.00     Pack years: 55.00     Types: Cigarettes     Quit date: 2013     Years since quittin.5   • Smokeless tobacco: Never Used   Substance and Sexual Activity   • Alcohol use: Yes     Alcohol/week: 6.0 oz     Types: 10 Glasses of wine per week   • Drug use: No   •  Sexual activity: Never   Other Topics Concern   • Not on file   Social History Narrative   • Not on file     Social Determinants of Health     Financial Resource Strain:    • Difficulty of Paying Living Expenses:    Food Insecurity:    • Worried About Running Out of Food in the Last Year:    • Ran Out of Food in the Last Year:    Transportation Needs:    • Lack of Transportation (Medical):    • Lack of Transportation (Non-Medical):    Physical Activity:    • Days of Exercise per Week:    • Minutes of Exercise per Session:    Stress:    • Feeling of Stress :    Social Connections:    • Frequency of Communication with Friends and Family:    • Frequency of Social Gatherings with Friends and Family:    • Attends [employer] Services:    • Active Member of Clubs or Organizations:    • Attends Club or Organization Meetings:    • Marital Status:    Intimate Partner Violence:    • Fear of Current or Ex-Partner:    • Emotionally Abused:    • Physically Abused:    • Sexually Abused:      Allergies   Allergen Reactions   • Penicillins Vomiting     Outpatient Encounter Medications as of 2/10/2021   Medication Sig Dispense Refill   • Cholecalciferol (VITAMIN D3) 2000 UNIT Cap Take 1 Cap by mouth every day.     • Cyanocobalamin (VITAMIN B-12) 1000 MCG Tab Take 1,000 mcg by mouth every day.     • escitalopram (LEXAPRO) 10 MG Tab TAKE 1 TABLET BY MOUTH EVERY NIGHT AT BEDTIME FOR DEPRESSION (Patient taking differently: Take 10 mg by mouth every day.) 90 Tab 0   • atorvastatin (LIPITOR) 20 MG Tab TAKE 1 TABLET BY MOUTH EVERY DAY FOR CHOLESTEROL 90 Tab 0   • metoprolol SR (TOPROL XL) 25 MG TABLET SR 24 HR Take 1 Tab by mouth every day. 90 Tab 3   • ezetimibe (ZETIA) 10 MG Tab Take 1 Tab by mouth every day. FOR CHOLESTEROL/STROKE PREVENTION 90 Tab 3   • omeprazole (PRILOSEC) 20 MG delayed-release capsule TAKE 1 CAPSULE BY MOUTH EVERY DAY 90 Cap 3   • ASPIRIN 81 PO Take 81 mg by mouth every day.     • Multiple Vitamins-Minerals  "(MULTIVITAMIN ADULT PO) Take 1 Tab by mouth every day. Indications: Centrum     • albuterol 108 (90 Base) MCG/ACT Aero Soln inhalation aerosol INHALE 2 PUFFS BY MOUTH EVERY 6 HOURS AS NEEDED FOR SHORTNESS OF BREATH 1 Inhaler 6   • HYDROcodone-acetaminophen (NORCO) 5-325 MG Tab per tablet TK 1 T PO  Q 4 H FOR 3 DAYS       No facility-administered encounter medications on file as of 2/10/2021.     Review of Systems   All other systems reviewed and are negative.       Objective:   /70 (BP Location: Left arm, Patient Position: Sitting, BP Cuff Size: Adult)   Pulse 70   Resp 14   Ht 1.626 m (5' 4\")   Wt 64.1 kg (141 lb 6.4 oz)   LMP  (LMP Unknown)   SpO2 95%   BMI 24.27 kg/m²     Physical Exam   Constitutional: She is oriented to person, place, and time. She appears well-developed and well-nourished. No distress.   HENT:   Head: Normocephalic and atraumatic.   Right Ear: External ear normal.   Left Ear: External ear normal.   Mouth/Throat: No oropharyngeal exudate.   Eyes: Pupils are equal, round, and reactive to light. Conjunctivae and EOM are normal. Right eye exhibits no discharge. Left eye exhibits no discharge. No scleral icterus.   Neck: No JVD present. No tracheal deviation present. No thyromegaly present.   Cardiovascular: Normal rate, regular rhythm, S1 normal, S2 normal and intact distal pulses. PMI is not displaced. Exam reveals no gallop, no S3, no S4 and no friction rub.   Murmur heard.  Pulses:       Carotid pulses are 2+ on the right side and 2+ on the left side.       Radial pulses are 2+ on the right side and 2+ on the left side.        Popliteal pulses are 2+ on the right side and 2+ on the left side.        Dorsalis pedis pulses are 2+ on the right side and 2+ on the left side.        Posterior tibial pulses are 2+ on the right side and 2+ on the left side.   Pulmonary/Chest: Effort normal and breath sounds normal. No respiratory distress. She has no wheezes. She has no rales. She exhibits " no tenderness.   Abdominal: Soft. Bowel sounds are normal. She exhibits no distension. There is no abdominal tenderness.   Musculoskeletal:         General: No tenderness or edema. Normal range of motion.      Cervical back: Normal range of motion and neck supple.   Neurological: She is alert and oriented to person, place, and time. No cranial nerve deficit (Cranial nerves II through XII grossly intact).   Skin: Skin is warm and dry. No rash noted. She is not diaphoretic. No erythema.   Psychiatric: She has a normal mood and affect. Her behavior is normal. Judgment and thought content normal.   Vitals reviewed.    LABS:  Lab Results   Component Value Date/Time    CHOLSTRLTOT 201 (H) 09/06/2018 06:51 PM     (H) 09/06/2018 06:51 PM    HDL 55 09/06/2018 06:51 PM    TRIGLYCERIDE 199 (H) 09/06/2018 06:51 PM       Lab Results   Component Value Date/Time    WBC 5.9 05/16/2020 02:44 AM    RBC 4.04 (L) 05/16/2020 02:44 AM    HEMOGLOBIN 13.2 05/16/2020 02:44 AM    HEMATOCRIT 39.8 05/16/2020 02:44 AM    MCV 98.5 (H) 05/16/2020 02:44 AM    NEUTSPOLYS 48.90 05/16/2020 02:44 AM    LYMPHOCYTES 33.60 05/16/2020 02:44 AM    MONOCYTES 15.00 (H) 05/16/2020 02:44 AM    EOSINOPHILS 1.50 05/16/2020 02:44 AM    BASOPHILS 0.70 05/16/2020 02:44 AM     Lab Results   Component Value Date/Time    SODIUM 140 05/16/2020 02:44 AM    POTASSIUM 3.7 05/16/2020 02:44 AM    CHLORIDE 102 05/16/2020 02:44 AM    CO2 26 05/16/2020 02:44 AM    GLUCOSE 116 (H) 05/16/2020 02:44 AM    BUN 17 05/16/2020 02:44 AM    CREATININE 0.52 05/16/2020 02:44 AM     Lab Results   Component Value Date    HBA1C 5.7 (H) 05/16/2020      Lab Results   Component Value Date/Time    ALKPHOSPHAT 66 05/15/2020 11:24 AM    ASTSGOT 24 05/15/2020 11:24 AM    ALTSGPT 15 05/15/2020 11:24 AM    TBILIRUBIN 0.5 05/15/2020 11:24 AM      No results found for: BNPBTYPENAT   No results found for: TSH  No results found for: PROTHROMBTM, INR     EKG (1/22/2019):  I have personally  reviewed the EKG this visit and discussed with the patient.  Sinus rhythm, left axis deviation, poor R wave progression.    ECHO CONCLUSIONS (5/15/2020):  Normal left ventricular systolic function.  Left ventricular ejection fraction is visually estimated to be 60%.  Indeterminate diastolic function.  The right ventricle was not well visualized but appeared to be normal   in size and function.  Mild to moderate mitral regurgitation.  Moderate mitral annular calcification.  Aortic sclerosis without stenosis.  Normal estimated right atrial pressure.   Unable to estimate pulmonary artery pressure due to an inadequate   tricuspid regurgitant jet.     No prior study is available for comparison.       Assessment:     1. Atherosclerosis of both carotid arteries     2. Other chest pain  EC-ECHOCARDIOGRAM COMPLETE W/O CONT   3. TIA (transient ischemic attack)     4. Nocturnal hypoxia         Medical Decision Making:  Today's Assessment / Status / Plan:     Doing well.  Asymptomatic NSVT.  DNR/DNI and would like to stay this way.  She is not a candidate for any cardiovascular interventions due to this and her preferences.  Her symptoms are noncardiac in nature.  They are likely gastrointestinal.  She does have mitral regurgitation and follow-up of this would be recommended as well.  Echocardiogram.  If unchanged or not dramatically worsened follow-up yearly.

## 2021-02-12 ENCOUNTER — TELEPHONE (OUTPATIENT)
Dept: MEDICAL GROUP | Facility: PHYSICIAN GROUP | Age: 86
End: 2021-02-12

## 2021-02-12 NOTE — TELEPHONE ENCOUNTER
[facility] was calling as it was unclear if they required a note from an office visit or just the form completed and signed?

## 2021-02-12 NOTE — TELEPHONE ENCOUNTER
Phone Number Called: [phone]    Call outcome: Did not leave a detailed message. Requested patient to call back.    Message: LVM for a staff to cb and inform provider if they just need to complete the form faxed to our office, or if they need the form and Provider Notes.

## 2021-02-12 NOTE — TELEPHONE ENCOUNTER
VOICEMAIL  1. Caller Name: Claudia Mantilla PT                       Call Back Number: [phone]    2. Message: Claudia MADDEN from [facility] stating that she sent over paperwork for [facility] in regards to requesting orders for balance, looked for them could not find them.       3. Patient approves office to leave a detailed voicemail/MyChart message: yes

## 2021-02-14 DIAGNOSIS — E78.2 MIXED HYPERLIPIDEMIA: ICD-10-CM

## 2021-02-16 ENCOUNTER — TELEPHONE (OUTPATIENT)
Dept: MEDICAL GROUP | Facility: PHYSICIAN GROUP | Age: 86
End: 2021-02-16

## 2021-02-16 NOTE — TELEPHONE ENCOUNTER
ESTABLISHED PATIENT PRE-VISIT PLANNING     Patient was NOT contacted to complete PVP.     Note: Patient will not be contacted if there is no indication to call.     1.  Reviewed notes from the last few office visits within the medical group: Yes    2.  If any orders were placed at last visit or intended to be done for this visit (i.e. 6 mos follow-up), do we have Results/Consult Notes?         •  Labs - Labs ordered, NOT completed. Patient advised to complete prior to next appointment.  Note: If patient appointment is for lab review and patient did not complete labs, check with provider if OK to reschedule patient until labs completed.       •  Imaging - Imaging was not ordered at last office visit.       •  Referrals - Referral ordered, patient has NOT been seen.    3. Is this appointment scheduled as a Hospital Follow-Up? No    4.  Immunizations were updated in Epic using Reconcile Outside Information activity? No    5.  Patient is due for the following Health Maintenance Topics:   Health Maintenance Due   Topic Date Due   • Annual Wellness Visit  02/08/1931   • Annual Pulmonary Function Test / Spirometry  [birth year]/08/1937   • IMM DTaP/Tdap/Td Vaccine (1 - Tdap) 02/08/1950   • IMM ZOSTER VACCINES (1 of 2) 02/08/1981   • BONE DENSITY  02/08/1996   • IMM PNEUMOCOCCAL VACCINE: 65+ Years (1 of 1 - PPSV23) 02/08/1996   • IMM INFLUENZA (1) 09/01/2020       - Patient plans to schedule appointment for Annual Wellness Visit (AWV) and Dexa Scan.    6.  AHA (Pulse8) form printed for Provider? N/A

## 2021-02-17 RX ORDER — ATORVASTATIN CALCIUM 20 MG/1
TABLET, FILM COATED ORAL
Qty: 90 TABLET | Refills: 0 | Status: SHIPPED | OUTPATIENT
Start: 2021-02-17 | End: 2021-05-28

## 2021-02-18 ENCOUNTER — HOSPITAL ENCOUNTER (OUTPATIENT)
Dept: LAB | Facility: MEDICAL CENTER | Age: 86
End: 2021-02-18
Attending: NURSE PRACTITIONER
Payer: MEDICARE

## 2021-02-18 DIAGNOSIS — E03.9 ACQUIRED HYPOTHYROIDISM: ICD-10-CM

## 2021-02-18 DIAGNOSIS — I10 ESSENTIAL HYPERTENSION: ICD-10-CM

## 2021-02-18 DIAGNOSIS — R73.03 PREDIABETES: ICD-10-CM

## 2021-02-18 LAB
ALBUMIN SERPL BCP-MCNC: 4.1 G/DL (ref 3.2–4.9)
ALBUMIN/GLOB SERPL: 1.4 G/DL
ALP SERPL-CCNC: 53 U/L (ref 30–99)
ALT SERPL-CCNC: 16 U/L (ref 2–50)
ANION GAP SERPL CALC-SCNC: 9 MMOL/L (ref 7–16)
AST SERPL-CCNC: 19 U/L (ref 12–45)
BASOPHILS # BLD AUTO: 0.8 % (ref 0–1.8)
BASOPHILS # BLD: 0.05 K/UL (ref 0–0.12)
BILIRUB SERPL-MCNC: 0.7 MG/DL (ref 0.1–1.5)
BUN SERPL-MCNC: 15 MG/DL (ref 8–22)
CALCIUM SERPL-MCNC: 9.4 MG/DL (ref 8.5–10.5)
CHLORIDE SERPL-SCNC: 102 MMOL/L (ref 96–112)
CHOLEST SERPL-MCNC: 128 MG/DL (ref 100–199)
CO2 SERPL-SCNC: 28 MMOL/L (ref 20–33)
CREAT SERPL-MCNC: 0.69 MG/DL (ref 0.5–1.4)
EOSINOPHIL # BLD AUTO: 0.06 K/UL (ref 0–0.51)
EOSINOPHIL NFR BLD: 0.9 % (ref 0–6.9)
ERYTHROCYTE [DISTWIDTH] IN BLOOD BY AUTOMATED COUNT: 47.5 FL (ref 35.9–50)
EST. AVERAGE GLUCOSE BLD GHB EST-MCNC: 134 MG/DL
FASTING STATUS PATIENT QL REPORTED: NORMAL
GLOBULIN SER CALC-MCNC: 2.9 G/DL (ref 1.9–3.5)
GLUCOSE SERPL-MCNC: 109 MG/DL (ref 65–99)
HBA1C MFR BLD: 6.3 % (ref 0–5.6)
HCT VFR BLD AUTO: 45.4 % (ref 37–47)
HDLC SERPL-MCNC: 44 MG/DL
HGB BLD-MCNC: 14.3 G/DL (ref 12–16)
IMM GRANULOCYTES # BLD AUTO: 0.01 K/UL (ref 0–0.11)
IMM GRANULOCYTES NFR BLD AUTO: 0.2 % (ref 0–0.9)
LDLC SERPL CALC-MCNC: 56 MG/DL
LYMPHOCYTES # BLD AUTO: 2.18 K/UL (ref 1–4.8)
LYMPHOCYTES NFR BLD: 33.2 % (ref 22–41)
MCH RBC QN AUTO: 31.4 PG (ref 27–33)
MCHC RBC AUTO-ENTMCNC: 31.5 G/DL (ref 33.6–35)
MCV RBC AUTO: 99.6 FL (ref 81.4–97.8)
MONOCYTES # BLD AUTO: 0.71 K/UL (ref 0–0.85)
MONOCYTES NFR BLD AUTO: 10.8 % (ref 0–13.4)
NEUTROPHILS # BLD AUTO: 3.55 K/UL (ref 2–7.15)
NEUTROPHILS NFR BLD: 54.1 % (ref 44–72)
NRBC # BLD AUTO: 0 K/UL
NRBC BLD-RTO: 0 /100 WBC
PLATELET # BLD AUTO: 205 K/UL (ref 164–446)
PMV BLD AUTO: 9.5 FL (ref 9–12.9)
POTASSIUM SERPL-SCNC: 4.3 MMOL/L (ref 3.6–5.5)
PROT SERPL-MCNC: 7 G/DL (ref 6–8.2)
RBC # BLD AUTO: 4.56 M/UL (ref 4.2–5.4)
SODIUM SERPL-SCNC: 139 MMOL/L (ref 135–145)
TRIGL SERPL-MCNC: 138 MG/DL (ref 0–149)
TSH SERPL DL<=0.005 MIU/L-ACNC: 2.15 UIU/ML (ref 0.38–5.33)
WBC # BLD AUTO: 6.6 K/UL (ref 4.8–10.8)

## 2021-02-18 PROCEDURE — 80053 COMPREHEN METABOLIC PANEL: CPT

## 2021-02-18 PROCEDURE — 80061 LIPID PANEL: CPT

## 2021-02-18 PROCEDURE — 83036 HEMOGLOBIN GLYCOSYLATED A1C: CPT | Mod: GA

## 2021-02-18 PROCEDURE — 84443 ASSAY THYROID STIM HORMONE: CPT

## 2021-02-18 PROCEDURE — 85025 COMPLETE CBC W/AUTO DIFF WBC: CPT

## 2021-02-18 PROCEDURE — 36415 COLL VENOUS BLD VENIPUNCTURE: CPT

## 2021-02-22 ENCOUNTER — OFFICE VISIT (OUTPATIENT)
Dept: MEDICAL GROUP | Facility: PHYSICIAN GROUP | Age: 86
End: 2021-02-22
Payer: MEDICARE

## 2021-02-22 ENCOUNTER — NURSE TRIAGE (OUTPATIENT)
Dept: HEALTH INFORMATION MANAGEMENT | Facility: OTHER | Age: 86
End: 2021-02-22

## 2021-02-22 VITALS
DIASTOLIC BLOOD PRESSURE: 60 MMHG | HEART RATE: 69 BPM | HEIGHT: 64 IN | BODY MASS INDEX: 24.24 KG/M2 | WEIGHT: 142 LBS | SYSTOLIC BLOOD PRESSURE: 100 MMHG | OXYGEN SATURATION: 95 % | RESPIRATION RATE: 16 BRPM | TEMPERATURE: 95.7 F

## 2021-02-22 DIAGNOSIS — G47.34 NOCTURNAL HYPOXIA: ICD-10-CM

## 2021-02-22 DIAGNOSIS — R73.03 PREDIABETES: ICD-10-CM

## 2021-02-22 DIAGNOSIS — R13.14 PHARYNGOESOPHAGEAL DYSPHAGIA: ICD-10-CM

## 2021-02-22 DIAGNOSIS — J42 CHRONIC BRONCHITIS, UNSPECIFIED CHRONIC BRONCHITIS TYPE (HCC): ICD-10-CM

## 2021-02-22 DIAGNOSIS — E53.8 B12 DEFICIENCY: ICD-10-CM

## 2021-02-22 DIAGNOSIS — R73.09 ELEVATED HEMOGLOBIN A1C: ICD-10-CM

## 2021-02-22 DIAGNOSIS — E03.9 ACQUIRED HYPOTHYROIDISM: ICD-10-CM

## 2021-02-22 DIAGNOSIS — K21.9 GASTROESOPHAGEAL REFLUX DISEASE WITHOUT ESOPHAGITIS: ICD-10-CM

## 2021-02-22 PROBLEM — Z71.89 ADVANCE CARE PLANNING: Status: RESOLVED | Noted: 2020-05-16 | Resolved: 2021-02-22

## 2021-02-22 PROCEDURE — 99214 OFFICE O/P EST MOD 30 MIN: CPT | Performed by: NURSE PRACTITIONER

## 2021-02-22 ASSESSMENT — FIBROSIS 4 INDEX: FIB4 SCORE: 2.09

## 2021-02-22 NOTE — ASSESSMENT & PLAN NOTE
Chronic in nature.  Stable.  TSH is within normal limits.  Patient states that she has been doing well.

## 2021-02-22 NOTE — PROGRESS NOTES
"Chief Complaint   Patient presents with   • Other     Esophageal concers, having trouble swallowing food or drinks, lots of mucus    • Follow-Up     labs       HISTORY OF PRESENT ILLNESS: Patient is a 89 y.o. female established patient who presents today to discuss thing stuck in her throat.    Pharyngoesophageal dysphagia  This is a new complaint today.  Patient states that over the last few weeks she feels like everything is getting stuck in her throat.  States she feels like things are difficult to swallow.  She has not noticed any coughing, choking and has not noticed any concern with thin liquids.  States that larger pieces of food seem to be more difficult for her to manage.  Patient states that she had some trouble with swallowing as a young girl.  States that she does chew her food at least \"100 times\".  States that she was hesitant to bring it up and does not know she wants to follow-up on it states that it is \"not that bad\".     Prediabetes  Chronic in nature.  Stable.  Most recent hemoglobin A1c is 6.3.  Patient states that she continues to eat significant amounts of sweets and \"is a chocolate fiend\".  Discussed with patient that decreasing sweets could be a good way to decrease her sugar but that overall I am unconcerned if she continues to eat some.  Denies any numbness and tingling in her hands or feet.    COPD (chronic obstructive pulmonary disease) (HCC)  Chronic in nature.  Stable.  Patient denies any shortness of breath, states that she has not had any coughing, she has not felt any chest pressure or shortness of breath.  States that she is not using oxygen that is recommended.  States that she does not want to be evaluated for sleep apnea.  Concerned that this may have relation with her increased complaint of mucus, intermittent runny nose.  Patient states that she does not want to use inhalers.      Acquired hypothyroidism  Chronic in nature.  Stable.  TSH is within normal limits.  Patient states " that she has been doing well.      Patient Active Problem List    Diagnosis Date Noted   • NSVT (nonsustained ventricular tachycardia) ([facility])    • Fall 05/08/2020   • COPD (chronic obstructive pulmonary disease) ([facility]) 07/19/2018   • Acquired hypothyroidism 07/19/2018   • Pharyngoesophageal dysphagia 02/22/2021   • Nonsustained ventricular tachycardia ([facility]) 08/13/2020   • Atrial tachycardia ([facility]) 08/13/2020   • Nocturnal hypoxia 08/13/2020   • Prediabetes 07/24/2020   • DNR (do not resuscitate) 05/28/2020   • Excessive daytime sleepiness 04/29/2020   • Chronic pain of both shoulders 04/09/2020   • Balance problem 04/09/2020   • Essential hypertension 03/26/2020   • Neck pain 01/24/2020   • Mild cognitive impairment 04/02/2019   • TIA (transient ischemic attack) 01/22/2019   • History of angina pectoris 09/13/2018   • Osteoarthritis of multiple joints 07/19/2018   • Mixed hyperlipidemia 07/19/2018   • Atherosclerosis of both carotid arteries 07/19/2018   • Gastroesophageal reflux disease without esophagitis 07/19/2018       Allergies:Penicillins    Current Outpatient Medications   Medication Sig Dispense Refill   • atorvastatin (LIPITOR) 20 MG Tab TAKE 1 TABLET BY MOUTH EVERY DAY FOR CHOLESTEROL 90 tablet 0   • Cholecalciferol (VITAMIN D3) 2000 UNIT Cap Take 1 Cap by mouth every day.     • Cyanocobalamin (VITAMIN B-12) 1000 MCG Tab Take 1,000 mcg by mouth every day.     • escitalopram (LEXAPRO) 10 MG Tab TAKE 1 TABLET BY MOUTH EVERY NIGHT AT BEDTIME FOR DEPRESSION (Patient taking differently: Take 10 mg by mouth every day.) 90 Tab 0   • HYDROcodone-acetaminophen (NORCO) 5-325 MG Tab per tablet TK 1 T PO  Q 4 H FOR 3 DAYS     • metoprolol SR (TOPROL XL) 25 MG TABLET SR 24 HR Take 1 Tab by mouth every day. 90 Tab 3   • ezetimibe (ZETIA) 10 MG Tab Take 1 Tab by mouth every day. FOR CHOLESTEROL/STROKE PREVENTION 90 Tab 3   • omeprazole (PRILOSEC) 20 MG delayed-release capsule TAKE 1 CAPSULE BY MOUTH EVERY DAY 90 Cap 3   •  ASPIRIN 81 PO Take 81 mg by mouth every day.     • Multiple Vitamins-Minerals (MULTIVITAMIN ADULT PO) Take 1 Tab by mouth every day. Indications: Centrum     • albuterol 108 (90 Base) MCG/ACT Aero Soln inhalation aerosol INHALE 2 PUFFS BY MOUTH EVERY 6 HOURS AS NEEDED FOR SHORTNESS OF BREATH 1 Inhaler 6     No current facility-administered medications for this visit.       Social History     Tobacco Use   • Smoking status: Former Smoker     Packs/day: 1.00     Years: 55.00     Pack years: 55.00     Types: Cigarettes     Quit date: 2013     Years since quittin.6   • Smokeless tobacco: Never Used   Substance Use Topics   • Alcohol use: Yes     Alcohol/week: 6.0 oz     Types: 10 Glasses of wine per week   • Drug use: No       Family Status   Relation Name Status   • Mo     • Son  Alive     Family History   Problem Relation Age of Onset   • Stroke Mother 69       Review of Systems:   Constitutional:  Negative for fever, chills, weight loss and malaise/fatigue.   HEENT:  Negative for ear pain, nosebleeds, congestion, sore throat and neck pain.    Eyes:  Negative for blurred vision.   Respiratory:  Negative for cough, sputum production, shortness of breath and wheezing.    Cardiovascular:  Negative for chest pain, palpitations, orthopnea and leg swelling.   Gastrointestinal:  Negative for heartburn, nausea, vomiting and abdominal pain.   Genitourinary:  Negative for dysuria, urgency and frequency.   Musculoskeletal:  Negative for myalgias, back pain and joint pain.   Skin:  Negative for rash and itching.   Neurological:  Negative for dizziness, tingling, tremors, sensory change, focal weakness and headaches.   Endo/Heme/Allergies:  Does not bruise/bleed easily.   Psychiatric/Behavioral:  Negative for depression, suicidal ideas and memory loss.  The patient is not nervous/anxious and does not have insomnia.    All other systems reviewed and are negative except as in HPI.    Exam:  /60 (BP Location:  "Left arm, Patient Position: Sitting, BP Cuff Size: Adult)   Pulse 69   Temp (!) 35.4 °C (95.7 °F) (Temporal)   Resp 16   Ht 1.626 m (5' 4\")   Wt 64.4 kg (142 lb)   SpO2 95%   General:  Normal appearing. No distress.  HEENT:  Normocephalic. Eyes conjunctiva clear lids without ptosis, pupils equal and reactive to light accommodation, ears normal shape and contour, canals are clear bilaterally, tympanic membranes are benign, nasal mucosa benign, oropharynx is without erythema, edema or exudates.   Neck:  Supple without JVD or bruit. Thyroid is not enlarged.  Pulmonary:  Clear to ausculation.  Normal effort. No rales, ronchi, or wheezing.  Cardiovascular:  Regular rate and rhythm without murmur. Carotid and radial pulses are intact and equal bilaterally.  Neurologic:  Grossly nonfocal  Lymph:  No cervical, supraclavicular or axillary lymph nodes are palpable  Skin:  Warm and dry.  No obvious lesions.  Musculoskeletal:  Normal gait. No extremity cyanosis, clubbing, or edema.  Psych:  Normal mood and affect. Alert and oriented x3. Judgment and insight is normal.      PLAN:    1. Elevated hemoglobin A1c  3. Prediabetes  Ordered for update.  - HEMOGLOBIN A1C; Future    2. B12 deficiency  Will update lab  - VITAMIN B12; Future    4. Pharyngoesophageal dysphagia  5. Gastroesophageal reflux disease without esophagitis  Concerns for changes in swallow patient will be referred to speech therapy for evaluation.  - REFERRAL TO SPEECH THERAPY    6. Nocturnal hypoxia  Refuses is to wear oxygen    7. Chronic bronchitis, unspecified chronic bronchitis type (HCC)  NU current management patient is not having symptoms of exacerbation.    8. Acquired hypothyroidism  Continue current management.    Labs reviewed completely during appointment today.    Follow-up in 3 months or sooner as needed.  Please note that this dictation was created using voice recognition software. I have made every reasonable attempt to correct obvious errors, " but I expect that there are errors of grammar and possibly content that I did not discover before finalizing the note.    Assessment/Plan:  1. Elevated hemoglobin A1c  HEMOGLOBIN A1C   2. B12 deficiency  VITAMIN B12   3. Prediabetes     4. Pharyngoesophageal dysphagia  REFERRAL TO SPEECH THERAPY   5. Gastroesophageal reflux disease without esophagitis  REFERRAL TO SPEECH THERAPY   6. Nocturnal hypoxia     7. Chronic bronchitis, unspecified chronic bronchitis type (HCC)     8. Acquired hypothyroidism            I have placed the below orders and discussed them with an approved delegating provider. The MA is performing the below orders under the direction of Dr. Ramey.

## 2021-02-22 NOTE — ASSESSMENT & PLAN NOTE
Chronic in nature.  Stable.  Patient denies any shortness of breath, states that she has not had any coughing, she has not felt any chest pressure or shortness of breath.  States that she is not using oxygen that is recommended.  States that she does not want to be evaluated for sleep apnea.  Concerned that this may have relation with her increased complaint of mucus, intermittent runny nose.  Patient states that she does not want to use inhalers.

## 2021-02-22 NOTE — TELEPHONE ENCOUNTER
1. Caller Name: Thalia                 Call Back Number: [phone] (home) [phone] (work)    [facility] PCP or Specialty Provider: Yes Hillary Koch        2.  Has the patient previously tested positive for COVID-19? No    3.  In the last two weeks, has the patient had any new or worsening symptoms (not explained by alternative diagnosis)? No.    4.  Does patient have any comoribidities? None     5.  Has the patient had any known contact with someone who is suspected or confirmed to have COVID-19? No.    5. Disposition: CLEARED FOR IN OFFICE VISIT    Note routed to [facility] Provider: NICK only.     CLEARED FOR IN OFFICE VISIT.

## 2021-02-22 NOTE — ASSESSMENT & PLAN NOTE
"Chronic in nature.  Stable.  Most recent hemoglobin A1c is 6.3.  Patient states that she continues to eat significant amounts of sweets and \"is a chocolate fiend\".  Discussed with patient that decreasing sweets could be a good way to decrease her sugar but that overall I am unconcerned if she continues to eat some.  Denies any numbness and tingling in her hands or feet.  "

## 2021-02-22 NOTE — ASSESSMENT & PLAN NOTE
"This is a new complaint today.  Patient states that over the last few weeks she feels like everything is getting stuck in her throat.  States she feels like things are difficult to swallow.  She has not noticed any coughing, choking and has not noticed any concern with thin liquids.  States that larger pieces of food seem to be more difficult for her to manage.  Patient states that she had some trouble with swallowing as a young girl.  States that she does chew her food at least \"100 times\".  States that she was hesitant to bring it up and does not know she wants to follow-up on it states that it is \"not that bad\".   "

## 2021-02-27 DIAGNOSIS — F33.41 RECURRENT MAJOR DEPRESSIVE DISORDER, IN PARTIAL REMISSION (HCC): ICD-10-CM

## 2021-02-27 DIAGNOSIS — E03.9 ACQUIRED HYPOTHYROIDISM: ICD-10-CM

## 2021-03-01 RX ORDER — ESCITALOPRAM OXALATE 10 MG/1
10 TABLET ORAL DAILY
Qty: 90 TABLET | Refills: 0 | Status: SHIPPED | OUTPATIENT
Start: 2021-03-01 | End: 2021-05-28

## 2021-03-01 RX ORDER — LEVOTHYROXINE SODIUM 0.03 MG/1
TABLET ORAL
Qty: 90 TABLET | Refills: 0 | Status: SHIPPED | OUTPATIENT
Start: 2021-03-01 | End: 2021-05-28

## 2021-03-10 ENCOUNTER — SPEECH THERAPY (OUTPATIENT)
Dept: SPEECH THERAPY | Facility: REHABILITATION | Age: 86
End: 2021-03-10
Attending: NURSE PRACTITIONER
Payer: MEDICARE

## 2021-03-10 DIAGNOSIS — R13.14 DYSPHAGIA, PHARYNGOESOPHAGEAL PHASE: ICD-10-CM

## 2021-03-10 DIAGNOSIS — K21.9 GASTROESOPHAGEAL REFLUX DISEASE WITHOUT ESOPHAGITIS: ICD-10-CM

## 2021-03-10 PROCEDURE — 92610 EVALUATE SWALLOWING FUNCTION: CPT

## 2021-03-10 ASSESSMENT — ENCOUNTER SYMPTOMS: NO PATIENT REPORTED PAIN: 1

## 2021-03-10 NOTE — OP THERAPY EVALUATION
"  Outpatient Speech Therapy  INITIAL EVALUATION    [facility] [address]  [address]  [place] [address]  Phone:  [phone]  Fax:  [phone]    Date of Evaluation: 03/10/2021    Patient: Thalia Horton  YOB: 1931  MRN: [id number]     Referring Provider: OSMAR PaizPYvetteRALEXIS  [address]  [place] [address]   Referring Diagnosis Pharyngoesophageal dysphagia [R13.14];Gastroesophageal reflux disease without esophagitis [K21.9]     Time Calculation    Start time: 1305  Stop time: 1355 Time Calculation (min): 50 minutes           Chief Complaint: Food Stuck In Throat    Visit Diagnoses     ICD-10-CM   1. Dysphagia, pharyngoesophageal phase  R13.14   2. Gastroesophageal reflux disease without esophagitis  K21.9     Subjective:   Reason for Therapy:     Reason For Evaluation:  Dysphagia    Onset Date:  2/22/2021    Onset Description:  Patient is a 89 year old female, referred to outpatient speech therapy in the setting of complaints regarding difficulty in swallow characterized by concern of food stuck in throat, especially pills and \"need to chew forever, until the food is mush\".     Patient resides at [address] [place] [address].   Social Support:     Patient Mental Status:  Alert and Responsive (Oriented x 3; good historian)  Progress Factors:     Progression:  Staying the same  Pain:     no pain reported    Therapy History:     Current Diet:  Regular Diet, Normal Consistency and Thin Liquids    Nutritional Concerns Restrictions and Allergies:  Patient endorses a 10 pound weight loss over the course of 3 years.     Hearing:  Hard of hearing    Vision:  Eye Glasses    Dentition:  Complete Dentition  Additional Subjective Comments:      Patient endorses \"the most scariest thing is when I can't breathe.\" Patient continues \"it almost always feels like there is something stuck in there\" identifying to the level of the suprasternal notch. Patient " "states need to \"chew more\" especially with leafy vegetables, meats and salads.     Past Medical History:   Diagnosis Date   • COPD (chronic obstructive pulmonary disease) (HCC)    • GERD (gastroesophageal reflux disease)    • Heart palpitations    • Hyperlipidemia    • Osteoarthritis    • Thyroid disease      Past Surgical History:   Procedure Laterality Date   • HIP REPLACEMENT, TOTAL Left      Objective:   Treatments/Interventions Performed:  Patient/Caregiver education  Other Treatment Interventions:  Assessment of patient swallowing through administration of patient-centered questionnaire EAT 10  Objective Details:  EAT 10 score: 15/40. Patient with complaints of (1) mild swallowing liquids takes extra effort. (2) moderate swallowing solids takes extra effort. (3) moderate-severe swallowing pills takes extra effort. (1) mild the pleasure of eating is affected by my swallowing. (4) severe when I swallow foods sticks in my throat. (2) moderate I cough when I eat and swallowing is stressful.     Speech Therapy Assessment:     Oral Motor Status:     Labial strength and control for patient: Good    Lingual strength and control for patient: Good    Patient saliva management: GoodPatient oral sensation and awareness: Good    Patient awareness of swallow problem: Good    Previous modified barium swallow: No    Oral motor status comments: Oral motor assessment revealed function within functional limits. Cranial nerve assessment was unremarkable.     Oral Phase Assessment:     Types of food within functional limits: Puree, Mechanical Soft, Regular and Thin Liquid    Oral phase comments: Slight incoordination with mastication of regular solids demonstrated as evidenced by patient biting tongue during mastication. Good strength of chew demonstrated. Patient with no oral residue with all textures trialed.     Pharyngeal Phase Assessment:     Delayed Swallow    Food types within functional limits: Puree and Thin Liquid    " Coughing after the swallow present: Mechanical Soft and Regular (minimal throat clearing/ intermittent)    Pharyngeal phase comments: Patient with need for liquid wash with mechanical soft/ mixed texture and regular solids to clear suspected pharyngeal residue. Further assessment of laryngeal elevation and pharyngeal clearance, including transition of bolus from pharynx to esophagus recommended through participation in formal assessment of swallow through MBS.     Speech Therapy Plan :   Prognosis & Recommendations  Impression Summary:  Thalia Horton, a 89 year old female, participated in an outpatient speech therapy evaluation of swallow in the setting of increased complaints of food stuck during intake and concerns regarding risk for choking with intake.     Results of today's bedside swallow assessment revealed largely no concern regarding oral phase of swallow; however, noted concern regarding pharyngeal phase of swallow specific to presence of intermittent throat clear with soft and bite sized, regular solids and complaints of food stuck, again identified to the level of suprasternal notch.     Given patient complaints regarding swallow, participation in a formal evaluation of swallow through Modified Barium Swallow Study is recommended to assess pharyngeal phase of swallow and provide insight regarding movement of solids from pharynx into the esophagus.     Participation in direct, outpatient speech therapy services was discussed with patient. At this time, patient wishes to participate in MBS and then return to outpatient speech therapy if indicated based on assessment results. Given patient with intermittent swallowing difficulty demonstrated during today's evaluation, ST was agreeable. Importance of oral care/ strict oral hygiene, posture during intake and small single bites educated with patient verbalizing understanding and agreement.   Prognosis:  Good  Prognosis Details:  Patient EAT 10 score was  suggestive of presence of pharyngeal dysphagia, which was further confirmed during therapist directed trials, specifically to presence of intermittent throat clearing with mechanical soft and regular solids. Further assessment of swallow through MBS recommended. Patient will proceed with outpatient speech therapy services based on results/ recommendations of MBS.   Compensatory swallow strategies:  Upright position 90 degrees during meal and 45 minutes after meal, Reduce bolus size, Alternate liquids/solids and No talking while eating  Diet Recommendation:  Normal Consistency  Liquid Recommendation:  Thin  Medication Administration:  Floated   Goals  Short Term Goals:  1) Patient will participate in formal evaluation of swallow through MBS with further development of outpatient speech therapy short term goals based on assessment results.  Short Term Goal Duration (Weeks):  2-4 weeks  Long Term Goals:  The patient will meet nutritional needs by mouth with a regular diet (IDDSI 7) and thin liquids (IDDSI 0) without clinical signs of aspiration/ penetration or reports of food stuck or choking within 8 weeks.   Long Term Goal Duration (Weeks):  1-2 months  Patient Stated Goal:  To not have the food stuck  Therapy Recommendations  Recommendation: Individual Speech Therapy, Modified Barium Swallow Study and Other, 1) Consideration for referral to GI for assessment of UES  Planned Therapy Interventions:  Home Program, Patient/Caregiver Education, Compensatory Strategy Training and Dysphagia treatment,   Plan Details:  Patient will return to outpatient speech therapy following results and/ or recommendations of formal evaluation of swallow through Modified Barium Swallow Study      Functional Assessment Used  EAT 10  Therapist directed trials    Referring provider co-signature:  I have reviewed this plan of care and my co-signature certifies the need for services.    Certification Period: 03/10/2021 to   05/05/21    Physician Signature: ________________________________ Date: ______________

## 2021-03-11 DIAGNOSIS — R13.13 PHARYNGEAL DYSPHAGIA: ICD-10-CM

## 2021-03-19 ENCOUNTER — HOSPITAL ENCOUNTER (OUTPATIENT)
Dept: CARDIOLOGY | Facility: MEDICAL CENTER | Age: 86
End: 2021-03-19
Attending: INTERNAL MEDICINE
Payer: MEDICARE

## 2021-03-19 DIAGNOSIS — R07.89 OTHER CHEST PAIN: ICD-10-CM

## 2021-03-19 PROCEDURE — 93306 TTE W/DOPPLER COMPLETE: CPT

## 2021-03-22 LAB
LV EJECT FRACT  99904: 55
LV EJECT FRACT MOD 2C 99903: 54.41
LV EJECT FRACT MOD 4C 99902: 42.98
LV EJECT FRACT MOD BP 99901: 48.35

## 2021-03-22 PROCEDURE — 93306 TTE W/DOPPLER COMPLETE: CPT | Mod: 26 | Performed by: INTERNAL MEDICINE

## 2021-03-31 ENCOUNTER — HOSPITAL ENCOUNTER (OUTPATIENT)
Dept: RADIOLOGY | Facility: MEDICAL CENTER | Age: 86
End: 2021-03-31
Attending: NURSE PRACTITIONER
Payer: MEDICARE

## 2021-03-31 DIAGNOSIS — R13.13 PHARYNGEAL DYSPHAGIA: ICD-10-CM

## 2021-03-31 DIAGNOSIS — R13.19 ESOPHAGEAL DYSPHAGIA: ICD-10-CM

## 2021-03-31 PROCEDURE — 74230 X-RAY XM SWLNG FUNCJ C+: CPT

## 2021-03-31 PROCEDURE — 92611 MOTION FLUOROSCOPY/SWALLOW: CPT

## 2021-03-31 NOTE — OP THERAPY EVALUATION
"[facility]  Speech-Language Pathology Department  Modified Barium Swallow Study Summary    Patient: Thalia Horton     Date of Evaluation: 3/31/2021    Referring Provider:  Marcelo SHORT; Fax: [phone]    Patient History/Reason for Referral: Pt was referred for MBS due to \"pharyngeal dysphagia,\" \"GI complaint\" and concerns during pharyngeal phase per SLP. Pt with PMHx notable for GERD, COPD and thyroid disease. No recent CXR/imaging on file.     Pt arrived unaccompanied to today's exam. Pt endorsed hx of intermittent/infrequent difficulties with swallowing food, difficulties swallowing pill during one episode and two incidents of waking up in the middle of the night with epigastric pain/discomfort and SOB/difficulties breathing. Pt eats a regular diet; however, reported distaste of food options at current facility ([facility] Otherwise, Pt denied hx of PNA. Pt reported she has been prescribed medications for GERD, but does not truly feel that she has/experiences heartburn/reflux. Pt was recently seen by Outpatient SLP Roseline Swift and was recommended to continue regular/thin diet with MBSS to further evaluate swallowing function.      Oral Mechanism Exam:   -Dentition: Intact (with upper/lower caps/veneers and partial upper plate)  -Labial: WFL  -Lingual: WFL  -Palatal Elevation: WFL  -Laryngeal Elevation: Complete  -Cough: Adequate  -Vocal Quality: Normal  - Circumlaryngeal Palpation: Pt endorsed mild discomfort, that she did not feel was mentionable/important    Procedure Results:  The examination was conducted with videofluoroscopy from a   Lateral and Anterior/Posterior view.  The following textures were presented:   Pudding, Cookie and Thin Liquid     Structural Abnormalities:  Possible muscle spasm vs. CP bar noted during serial sips; not impactful to bolus flow    The following observations were made:   (WFL unless otherwise noted)    Oral Phase Thin Liquids  Puree Solid "   Lip Closure      Tongue Control During Bolus Hold      Premature spillage into the valleculae      Premature spillage into the pyriform sinus      Bolus Preparation/ Mastication      Bolus Transport/ Lingual Motion      Oral Residue after swallow X  trace X  trace X  trace   Initiation of Pharyngeal Swallow  X  valleculae       Other Observations:            Pharyngeal Phase Thin Liquids  Puree Solid   Soft Palate Elevation      Laryngeal Elevation      Anterior Hyoid Excursion      Epiglottic Inversion       Laryngeal Vestibular Closure      Pharyngeal Stripping Wave      Pharyngoesophageal Segment Opening (PES)      Tongue Base Retraction (BOT) and/or BOT Residue      Residue in valleculae      Residue in piriform sinus(es)      Residue on posterior pharyngeal wall (PPW)      Penetration      Aspiration        Other Observations:      Penetration Aspiration Scale:   Score of 1: Neither penetration nor aspiration  Score of 2-5: Penetration  Score of 6-8: Aspiration     1: Material does not enter airway  2: Material enters airway, but remains above vocal folds; Ejected from airway; no stasis  3: Material remains above vocal folds; visible stasis remains  4: Material contacts vocal folds, but is ejected; no stasis  5: Material contacts vocal folds, and is not ejected; visible stasis remains  6: Material passes glottis, but is ejected from airway; No visible subglottic stasis  7: Material passes glottis, but is not ejected from airway; visible subglottic stasis despite patient’s response  8: Material passes glottis, and is not ejected; visible subglottic stasis; Absent patient response                          Esophageal Phase:  Mid-esophageal retention below the UES                                Impressions:  Pt with no penetration nor aspiration appreciated during today's exam. Oral swallow phase characterized by trace lingual residue for all textures (possibly due to xerostomia), and delayed swallow initiation  to the level of the valleculae for pudding (which is a normal variant.) Pharyngeal swallow phase was WFL. Esophageal dysphagia characterized by mid-esophageal retention below UES.    Discussed vocal hygiene habits and importance of hydration and Pt verbalized understanding. Pt will benefit from GI and/or barium esophagram to further evaluate esophageal swallow phase. Pt reported that her family was concerned about cancer; however, SLP informed her that this test is not designed/intended to diagnose cancer and is strictly for evaluation of swallowing.      Recommendations: Diet:     Regular       Liquid:     Thin                                        Medications:  Per tolerance            Compensatory Strategies:   Upright 90 degrees, small bites/sips, slow feeding, avoid problematic foods, remain upright for >30-min after eating/drinking        Your patient may benefit from a referral for:     1.) GI and/or barium esophagram to further evaluate esophageal swallow phase  2.) Follow-up outpatient SLP services to address vocal hygiene         Thank you for the referral.    For further questions, please call [phone].       Luz Dial, SLP

## 2021-04-12 ENCOUNTER — TELEPHONE (OUTPATIENT)
Dept: SPEECH THERAPY | Facility: REHABILITATION | Age: 86
End: 2021-04-12

## 2021-04-12 NOTE — OP THERAPY DISCHARGE SUMMARY
Outpatient Speech Language Pathology  DISCHARGE SUMMARY NOTE      [facility] Speech Language Pathology [address]  [address]  [place] [address]  Phone:  [phone]  Fax:  [phone]        Patient Name:  Thalia Horton  :  1931  MR#:  [id number]    HICN:       Visits:         Cancel/No-Show:     Diagnosis/ICD-10:     Referring Provider:     SOC Date:    Onset Date:       Your patient is being discharged from Physical therapy with the following comments:  Goals Met      Comments:Thalia Horton, a 89 year old female, participated in an outpatient speech therapy evaluation of swallow in the setting of increased complaints of food stuck during intake and concerns regarding risk for choking with intake.     Per recommendations of the initial evaluation, patient participated in an formal evaluation of swallow through MBS with results indicating no further outpatient speech therapy addressing swallow indicated given a Penetration Aspiration Scale of 1.       Limitations/Remaining: Recommendations based on results of MBS completed 3/31/2021 were: 1.) GI and/or barium esophagram to further evaluate esophageal swallow phase  2.) Follow-up outpatient SLP services to address vocal hygiene         Recommendations:D/C outpatient speech therapy addressing dysphagia given positive results of MBS. Patient is welcome to return to outpatient speech therapy addressing voice, but would need a new physician referral.         Roseline Swift, SLP

## 2021-04-13 ENCOUNTER — HOSPITAL ENCOUNTER (OUTPATIENT)
Dept: LAB | Facility: MEDICAL CENTER | Age: 86
End: 2021-04-13
Attending: NURSE PRACTITIONER
Payer: MEDICARE

## 2021-04-13 DIAGNOSIS — E53.8 B12 DEFICIENCY: ICD-10-CM

## 2021-04-13 DIAGNOSIS — R73.09 ELEVATED HEMOGLOBIN A1C: ICD-10-CM

## 2021-04-13 LAB
EST. AVERAGE GLUCOSE BLD GHB EST-MCNC: 120 MG/DL
HBA1C MFR BLD: 5.8 % (ref 4–5.6)
VIT B12 SERPL-MCNC: 876 PG/ML (ref 211–911)

## 2021-04-13 PROCEDURE — 36415 COLL VENOUS BLD VENIPUNCTURE: CPT

## 2021-04-13 PROCEDURE — 83036 HEMOGLOBIN GLYCOSYLATED A1C: CPT | Mod: GA

## 2021-04-13 PROCEDURE — 82607 VITAMIN B-12: CPT

## 2021-05-04 ENCOUNTER — OFFICE VISIT (OUTPATIENT)
Dept: MEDICAL GROUP | Facility: PHYSICIAN GROUP | Age: 86
End: 2021-05-04
Payer: MEDICARE

## 2021-05-04 VITALS
OXYGEN SATURATION: 96 % | BODY MASS INDEX: 25.44 KG/M2 | HEART RATE: 77 BPM | DIASTOLIC BLOOD PRESSURE: 60 MMHG | WEIGHT: 149 LBS | RESPIRATION RATE: 28 BRPM | HEIGHT: 64 IN | TEMPERATURE: 97 F | SYSTOLIC BLOOD PRESSURE: 124 MMHG

## 2021-05-04 DIAGNOSIS — R73.03 PREDIABETES: ICD-10-CM

## 2021-05-04 DIAGNOSIS — G47.34 NOCTURNAL HYPOXIA: ICD-10-CM

## 2021-05-04 DIAGNOSIS — R13.14 PHARYNGOESOPHAGEAL DYSPHAGIA: ICD-10-CM

## 2021-05-04 DIAGNOSIS — G31.84 MILD COGNITIVE IMPAIRMENT: ICD-10-CM

## 2021-05-04 DIAGNOSIS — R09.89 RUNNY NOSE: ICD-10-CM

## 2021-05-04 DIAGNOSIS — K21.9 GASTROESOPHAGEAL REFLUX DISEASE WITHOUT ESOPHAGITIS: ICD-10-CM

## 2021-05-04 DIAGNOSIS — I10 ESSENTIAL HYPERTENSION: ICD-10-CM

## 2021-05-04 PROCEDURE — 99214 OFFICE O/P EST MOD 30 MIN: CPT | Performed by: NURSE PRACTITIONER

## 2021-05-04 RX ORDER — FLUTICASONE PROPIONATE 50 MCG
1 SPRAY, SUSPENSION (ML) NASAL DAILY
Qty: 16 G | Refills: 0 | Status: SHIPPED | OUTPATIENT
Start: 2021-05-04 | End: 2021-07-01

## 2021-05-04 ASSESSMENT — FIBROSIS 4 INDEX: FIB4 SCORE: 2.09

## 2021-05-04 NOTE — PROGRESS NOTES
Chief Complaint   Patient presents with   • Lab Results   • Follow-Up     Sleep study        HISTORY OF PRESENT ILLNESS: Patient is a 89 y.o. female established patient who presents today to several issues.    Pharyngoesophageal dysphagia  This is a continued issue.  Patient states that since evaluation with speech therapy she has been doing much better as she has been following their advice for changes to improve her swallow.  Patient does not want a referral for vocal hygiene at this time but does wish to follow through with the referral they recommended to gastroenterology for further evaluation.  Patient states that considering the increased care she has been taking with swelling she has been doing much better and feeling overall well.    Nocturnal hypoxia  Patient has not completed recommended sleep study and does not wish to complete that at this time.    Prediabetes  Running in nature.  A1c is improved from 6.3-5.8 and patient is overall doing well.  She states she has made some small modifications in her diet which have been effective.    Essential hypertension  In nature.  Stable.  Blood pressure today is 124/60 and patient states that she has been feeling well recently she has continued to have some intermittent afternoon fatigue but states that her blood pressure has been doing well and that she is not having any chest pain, palpitations, dizziness, blurry vision.      Patient Active Problem List    Diagnosis Date Noted   • NSVT (nonsustained ventricular tachycardia) ([facility])    • Fall 05/08/2020   • COPD (chronic obstructive pulmonary disease) ([facility]) 07/19/2018   • Acquired hypothyroidism 07/19/2018   • Pharyngoesophageal dysphagia 02/22/2021   • Nonsustained ventricular tachycardia (HCC) 08/13/2020   • Atrial tachycardia (HCC) 08/13/2020   • Nocturnal hypoxia 08/13/2020   • Prediabetes 07/24/2020   • DNR (do not resuscitate) 05/28/2020   • Excessive daytime sleepiness 04/29/2020   • Chronic pain of both  shoulders 04/09/2020   • Balance problem 04/09/2020   • Essential hypertension 03/26/2020   • Neck pain 01/24/2020   • Mild cognitive impairment 04/02/2019   • TIA (transient ischemic attack) 01/22/2019   • History of angina pectoris 09/13/2018   • Osteoarthritis of multiple joints 07/19/2018   • Mixed hyperlipidemia 07/19/2018   • Atherosclerosis of both carotid arteries 07/19/2018   • Gastroesophageal reflux disease without esophagitis 07/19/2018       Allergies:Penicillins    Current Outpatient Medications   Medication Sig Dispense Refill   • fluticasone (FLONASE) 50 MCG/ACT nasal spray Administer 1 Spray into affected nostril(S) every day. 16 g 0   • omeprazole (PRILOSEC) 20 MG delayed-release capsule TAKE 1 CAPSULE BY MOUTH EVERY DAY 90 capsule 0   • levothyroxine (SYNTHROID) 25 MCG Tab TAKE 1 TABLET BY MOUTH EVERY MORNING ON AN EMPTY STOMACH FOR THYROID 90 tablet 0   • escitalopram (LEXAPRO) 10 MG Tab Take 1 tablet by mouth every day. 90 tablet 0   • atorvastatin (LIPITOR) 20 MG Tab TAKE 1 TABLET BY MOUTH EVERY DAY FOR CHOLESTEROL 90 tablet 0   • Cholecalciferol (VITAMIN D3) 2000 UNIT Cap Take 1 Cap by mouth every day.     • Cyanocobalamin (VITAMIN B-12) 1000 MCG Tab Take 1,000 mcg by mouth every day.     • HYDROcodone-acetaminophen (NORCO) 5-325 MG Tab per tablet TK 1 T PO  Q 4 H FOR 3 DAYS     • metoprolol SR (TOPROL XL) 25 MG TABLET SR 24 HR Take 1 Tab by mouth every day. 90 Tab 3   • ezetimibe (ZETIA) 10 MG Tab Take 1 Tab by mouth every day. FOR CHOLESTEROL/STROKE PREVENTION 90 Tab 3   • ASPIRIN 81 PO Take 81 mg by mouth every day.     • Multiple Vitamins-Minerals (MULTIVITAMIN ADULT PO) Take 1 Tab by mouth every day. Indications: Centrum     • albuterol 108 (90 Base) MCG/ACT Aero Soln inhalation aerosol INHALE 2 PUFFS BY MOUTH EVERY 6 HOURS AS NEEDED FOR SHORTNESS OF BREATH 1 Inhaler 6     No current facility-administered medications for this visit.       Social History     Tobacco Use   • Smoking status:  "Former Smoker     Packs/day: 1.00     Years: 55.00     Pack years: 55.00     Types: Cigarettes     Quit date: 2013     Years since quittin.7   • Smokeless tobacco: Never Used   Substance Use Topics   • Alcohol use: Yes     Alcohol/week: 6.0 oz     Types: 10 Glasses of wine per week   • Drug use: No       Family Status   Relation Name Status   • Mo     • Son  Alive     Family History   Problem Relation Age of Onset   • Stroke Mother 69       ROS:   Patient denies headache, blurry vision, dizziness, chest pain, shortness of breath, abdominal pain, nausea, vomiting, change in level of consciousness.  All other systems reviewed and are negative except as in HPI.    Exam:  /60 (BP Location: Right arm, Patient Position: Sitting, BP Cuff Size: Adult)   Pulse 77   Temp 36.1 °C (97 °F) (Temporal)   Resp (!) 28   Ht 1.626 m (5' 4\")   Wt 67.6 kg (149 lb)   SpO2 96%    Constitutional: Alert, no distress, well-groomed.  Skin: Warm, dry, good turgor, no rashes in visible areas.  Eye: Equal, round and reactive, conjunctiva clear, lids normal.  ENMT: Lips without lesions, good dentition, moist mucous membranes.  Neck: Trachea midline, no masses, no thyromegaly.  Respiratory: Unlabored respiratory effort, no cough.  MSK: Normal gait, moves all extremities.  Neuro: Grossly non-focal.   Psych: Alert and oriented x3, normal affect and mood.      PLAN:    1. Pharyngoesophageal dysphagia  Patient will follow up with gastroenterology as recommended by speech therapy she and I both agree that this is the best course of action.  I did discuss with her my recommendation that she follow-up with speech therapy and she declines at this time.  - REFERRAL TO GASTROENTEROLOGY    3. Essential hypertension  Continue current medication.    4. Mild cognitive impairment  Patient is slowly becoming more forgetful at this time she declines further assistance as she states she is living in an assisted living and doing " well.    5. Gastroesophageal reflux disease without esophagitis  Continue current plan of care and medication.    6. Prediabetes  Continue improvements in diet and exercise.    7. Runny nose  We will start Flonase daily related to itchy watery eyes, itchy nose and other allergy symptoms.  - fluticasone (FLONASE) 50 MCG/ACT nasal spray; Administer 1 Spray into affected nostril(S) every day.  Dispense: 16 g; Refill: 0    8. Nocturnal hypoxia  Patient is recommended to follow-up with sleep medicine she has not completed recommended sleep study.      Return in about 6 months (around 11/4/2021) for swallow, chronic concerns.    Please note that this dictation was created using voice recognition software. I have made every reasonable attempt to correct obvious errors, but I expect that there are errors of grammar and possibly content that I did not discover before finalizing the note.

## 2021-05-18 ENCOUNTER — TELEPHONE (OUTPATIENT)
Dept: MEDICAL GROUP | Facility: PHYSICIAN GROUP | Age: 86
End: 2021-05-18

## 2021-05-18 NOTE — TELEPHONE ENCOUNTER
Phone Number Called: [phone]    Call outcome: Spoke w/  at [facility].    Message: Informed them of the Pt trying without success to schedule an appt, and asked if they would call the Pt by end of day.  Representative stated they would call the Pt back this afternoon and get her scheduled.  No further action required at this time.

## 2021-05-18 NOTE — TELEPHONE ENCOUNTER
Phone Number Called: [phone] (home)     Call outcome: Spoke to patient regarding message below.    Message: Pt stated she was having difficulties scheduling an appt with the GI specialist she had been referred to.  She stated she had called several times, and left several messages, but had yet to receive a cb from their office.  Pt asked for assistance from our office to help her get scheduled.  Informed the Pt would call the GI specialist office and see if I could have them call her back to get an appt scheduled. Informed the Pt if she had not heard anything from their office by end of day today to try calling them again on the rojas.  Pt agreed.

## 2021-05-18 NOTE — TELEPHONE ENCOUNTER
VOICEMAIL  1. Caller Name: Thalia Horton                      Call Back Number: [phone] (home)     2. Message: Pt called stating she was having a hard time scheduling an appt and asked for a cb.      3. Patient approves office to leave a detailed voicemail/MyChart message: yes

## 2021-05-28 DIAGNOSIS — E78.2 MIXED HYPERLIPIDEMIA: ICD-10-CM

## 2021-05-28 DIAGNOSIS — E03.9 ACQUIRED HYPOTHYROIDISM: ICD-10-CM

## 2021-05-28 DIAGNOSIS — F33.41 RECURRENT MAJOR DEPRESSIVE DISORDER, IN PARTIAL REMISSION (HCC): ICD-10-CM

## 2021-06-02 RX ORDER — LEVOTHYROXINE SODIUM 0.03 MG/1
TABLET ORAL
Qty: 90 TABLET | Refills: 0 | Status: SHIPPED | OUTPATIENT
Start: 2021-06-02 | End: 2021-08-31

## 2021-06-02 RX ORDER — ATORVASTATIN CALCIUM 20 MG/1
TABLET, FILM COATED ORAL
Qty: 90 TABLET | Refills: 0 | Status: SHIPPED | OUTPATIENT
Start: 2021-06-02 | End: 2021-08-31

## 2021-06-02 RX ORDER — ESCITALOPRAM OXALATE 10 MG/1
10 TABLET ORAL DAILY
Qty: 90 TABLET | Refills: 0 | Status: SHIPPED | OUTPATIENT
Start: 2021-06-02 | End: 2021-08-31

## 2021-06-08 ENCOUNTER — SLEEP CENTER VISIT (OUTPATIENT)
Dept: SLEEP MEDICINE | Facility: MEDICAL CENTER | Age: 86
End: 2021-06-08
Payer: MEDICARE

## 2021-06-08 VITALS
RESPIRATION RATE: 16 BRPM | BODY MASS INDEX: 24.92 KG/M2 | SYSTOLIC BLOOD PRESSURE: 122 MMHG | HEART RATE: 74 BPM | OXYGEN SATURATION: 95 % | HEIGHT: 64 IN | DIASTOLIC BLOOD PRESSURE: 82 MMHG | WEIGHT: 146 LBS

## 2021-06-08 DIAGNOSIS — G47.34 NOCTURNAL HYPOXIA: ICD-10-CM

## 2021-06-08 DIAGNOSIS — G47.33 OSA (OBSTRUCTIVE SLEEP APNEA): ICD-10-CM

## 2021-06-08 PROCEDURE — 99203 OFFICE O/P NEW LOW 30 MIN: CPT | Performed by: FAMILY MEDICINE

## 2021-06-08 ASSESSMENT — FIBROSIS 4 INDEX: FIB4 SCORE: 2.09

## 2021-06-08 NOTE — PROGRESS NOTES
"  [facility]  Consult Note     Date: 6/8/2021 / Time: 11:08 AM    Patient ID:   Name:             Thalia Horton   YOB: 1931  Age:                 89 y.o.  female   MRN:               [id number]      Thank you for requesting a sleep medicine consultation on Thalia Horton at the sleep center. She presents today with the chief complaints of frequent awakenings, non restful sleep and night time low oxygen. She is referred by Dr. Payton for evaluation and treatment of sleep disorder breathing.  His history of nocturnal hypoxia currently supposed to be on oxygen at nighttime however she is not compliant with the therapy.  HISTORY OF PRESENT ILLNESS:       At night,  Thalia Horton goes to bed around 10 pm on weekdays and on the weekends. She gets out of bed at 7-8 am on weekdays and on the weekends.  She averages about 10 hrs of sleep on a good night and 7-8 hrs on a bad night. She falls asleep within few minutes. She wakes up about 2-3 times during the night due to bathroom use and on average It takes her few min to fall back asleep.     She lives by her self and she is not aware of snoring/breathing pauses/gasping or choking in sleep.  She  denies any symptoms of restless legs syndrome such as an \"urge to move\"  She  legs in the evening or bedtime. She  denies any symptoms of narcolepsy such as sleep paralysis or cataplexy, or any symptoms to suggest parasomnias such as sleep walking or acting out of dreams. She  has not used any medications for the sleep problem.Overall, she does finds her sleep refreshing.She does not take regular naps. She drinks about 1 caffeinated beverages per day.    Her other comorbid conditions include TIA, prediabetes, nocturnal hypoxia, hyperlipidemia, hypertension, COPD and atrial tachycardia.  Her current medications include Synthroid, Lexapro, Lipitor, Prilosec, Flonase, Norco, metoprolol SR, Zetia and multivitamins.  An overnight pulse " oximetry on 8/19/2020 which showed O2 brooke of 72% and average O2 saturation 86%.  She spent 454 minutes below 88 SPO2 with an PARRIS of 39/h.    REVIEW OF SYSTEMS:       Constitutional: Denies fevers, Denies weight changes  Eyes: Denies changes in vision, no eye pain  Ears/Nose/Throat/Mouth: Denies nasal congestion or sore throat   Cardiovascular: Denies chest pain or palpitations   Respiratory: Denies shortness of breath , Denies cough  Gastrointestinal/Hepatic: Denies abdominal pain, nausea  Genitourinary: Denies bladder dysfunction, dysuria or frequency  Musculoskeletal/Rheum: Denies  joint pain and swelling   Skin/Breast: Denies rash  Neurological: Denies headache, confusion,   Psychiatric: denies mood disorder     Comprehensive review of systems form is reviewed with the patient and is attached in the EMR.     PMH:  has a past medical history of Chickenpox, COPD (chronic obstructive pulmonary disease) (HCC), GERD (gastroesophageal reflux disease), [place] measles, Heart palpitations, Hyperlipidemia, Osteoarthritis, and Thyroid disease.  MEDS:   Current Outpatient Medications:   •  levothyroxine (SYNTHROID) 25 MCG Tab, TAKE 1 TABLET BY MOUTH EVERY MORNING ON AN EMPTY STOMACH FOR THYROID, Disp: 90 tablet, Rfl: 0  •  escitalopram (LEXAPRO) 10 MG Tab, TAKE 1 TABLET BY MOUTH EVERY DAY, Disp: 90 tablet, Rfl: 0  •  atorvastatin (LIPITOR) 20 MG Tab, TAKE 1 TABLET BY MOUTH EVERY DAY FOR CHOLESTEROL, Disp: 90 tablet, Rfl: 0  •  fluticasone (FLONASE) 50 MCG/ACT nasal spray, Administer 1 Spray into affected nostril(S) every day., Disp: 16 g, Rfl: 0  •  omeprazole (PRILOSEC) 20 MG delayed-release capsule, TAKE 1 CAPSULE BY MOUTH EVERY DAY, Disp: 90 capsule, Rfl: 0  •  Cholecalciferol (VITAMIN D3) 2000 UNIT Cap, Take 1 Cap by mouth every day., Disp: , Rfl:   •  Cyanocobalamin (VITAMIN B-12) 1000 MCG Tab, Take 1,000 mcg by mouth every day., Disp: , Rfl:   •  HYDROcodone-acetaminophen (NORCO) 5-325 MG Tab per tablet, TK 1 T PO  Q  "4 H FOR 3 DAYS, Disp: , Rfl:   •  metoprolol SR (TOPROL XL) 25 MG TABLET SR 24 HR, Take 1 Tab by mouth every day., Disp: 90 Tab, Rfl: 3  •  ezetimibe (ZETIA) 10 MG Tab, Take 1 Tab by mouth every day. FOR CHOLESTEROL/STROKE PREVENTION, Disp: 90 Tab, Rfl: 3  •  ASPIRIN 81 PO, Take 81 mg by mouth every day., Disp: , Rfl:   •  Multiple Vitamins-Minerals (MULTIVITAMIN ADULT PO), Take 1 Tab by mouth every day. Indications: Centrum, Disp: , Rfl:   •  albuterol 108 (90 Base) MCG/ACT Aero Soln inhalation aerosol, INHALE 2 PUFFS BY MOUTH EVERY 6 HOURS AS NEEDED FOR SHORTNESS OF BREATH, Disp: 1 Inhaler, Rfl: 6  ALLERGIES:   Allergies   Allergen Reactions   • Penicillins Vomiting     SURGHX:   Past Surgical History:   Procedure Laterality Date   • HIP REPLACEMENT, TOTAL Left      SOCHX:  reports that she quit smoking about 7 years ago. Her smoking use included cigarettes. She has a 55.00 pack-year smoking history. She has never used smokeless tobacco. She reports current alcohol use of about 6.0 oz of alcohol per week. She reports that she does not use drugs.   FH:   Family History   Problem Relation Age of Onset   • Stroke Mother 69       Physical Exam:  Vitals/ General Appearance:   Weight/BMI: Body mass index is 25.06 kg/m².  /82 (BP Location: Right arm, Patient Position: Sitting, BP Cuff Size: Adult)   Pulse 74   Resp 16   Ht 1.626 m (5' 4\")   Wt 66.2 kg (146 lb)   SpO2 95%   Vitals:    06/08/21 1103   BP: 122/82   BP Location: Right arm   Patient Position: Sitting   BP Cuff Size: Adult   Pulse: 74   Resp: 16   SpO2: 95%   Weight: 66.2 kg (146 lb)   Height: 1.626 m (5' 4\")           Constitutional: Alert, no distress, well-groomed.  Skin: No rashes in visible areas.  Eye: Round. Conjunctiva clear, lids normal. No icterus.   ENMT: Lips pink without lesions, good dentition, moist mucous membranes. Phonation normal.  Neck: No masses, no thyromegaly. Moves freely without pain.  CV: Pulse as reported by " patient  Respiratory: Unlabored respiratory effort, no cough or audible wheeze  Psych: Alert and oriented x3, normal affect and mood.   INVESTIGATIONS:       ASSESSMENT AND PLAN     1. She  has symptoms of Obstructive Sleep Apnea (DOMINGO).The pathophysiology of DOMINGO and the increased risk of cardiovascular morbidity from untreated DOMINGO is discussed in detail with the patient. She  also has HTN which can be worsened by her DOMINGO.     We have discussed diagnostic options including in-laboratory, attended polysomnography and home sleep testing.  However she does not want to do in lab or home sleep study.  Hence strongly recommended to use the nocturnal oxygen consistently.  She was given nasal cannula with smaller prongs because she has hard time using her current cannula due to discomfort.  She was also recommended to use paper tape to keep them in place so it does not dislodge.  We will repeat OPO on her next follow-up       The increased risk of cardiovascular and neurovascular disease with untreated sleep apnea was discussed in detail.  She already has history of SVT and TIA in the past.  However patient still deferred the sleep study      2. Regarding treatment of other past medical problems and general health maintenance,  She is urged to follow up with PCP.

## 2021-06-17 ENCOUNTER — TELEPHONE (OUTPATIENT)
Dept: MEDICAL GROUP | Facility: PHYSICIAN GROUP | Age: 86
End: 2021-06-17

## 2021-06-17 NOTE — TELEPHONE ENCOUNTER
VOICEMAIL  1. Caller Name: Thalia Horton                      Call Back Number: [phone]    2. Message: Pt called to ask for a cb from MA of PCP for assistance with appointments.

## 2021-06-17 NOTE — TELEPHONE ENCOUNTER
Phone Number Called: [phone] (home)     Call outcome: NO VM set up     Message: Pt did not answer and No VM set up.

## 2021-06-21 NOTE — TELEPHONE ENCOUNTER
Phone Number Called: [phone]     Call outcome:  VCM not set up.     MyChart message sent to patient asking if she still requires assistance with appointments as voicemail is not set up.  Unable to reach via phone x2.

## 2021-07-01 DIAGNOSIS — R09.89 RUNNY NOSE: ICD-10-CM

## 2021-07-01 RX ORDER — FLUTICASONE PROPIONATE 50 MCG
1 SPRAY, SUSPENSION (ML) NASAL DAILY
Qty: 16 G | Refills: 0 | Status: SHIPPED | OUTPATIENT
Start: 2021-07-01 | End: 2021-09-07

## 2021-07-02 ENCOUNTER — TELEPHONE (OUTPATIENT)
Dept: MEDICAL GROUP | Facility: PHYSICIAN GROUP | Age: 86
End: 2021-07-02

## 2021-07-03 NOTE — TELEPHONE ENCOUNTER
I would need more information. What labs and why? Or I would recommend she schedule an appointment to discuss.

## 2021-07-06 RX ORDER — OMEPRAZOLE 20 MG/1
CAPSULE, DELAYED RELEASE ORAL
Qty: 90 CAPSULE | Refills: 0 | Status: SHIPPED | OUTPATIENT
Start: 2021-07-06 | End: 2021-10-05

## 2021-07-06 NOTE — TELEPHONE ENCOUNTER
Phone Number Called: [phone] (home)     Call outcome: Spoke to patient regarding message below.    Message: Pt was under the impression PCP had already ordered labs for her to do.  Informed the Pt there are no open orders for her for labs at this time.  And the last labs PCP had ordered she completed on 04/13/21.  No further action required.

## 2021-07-06 NOTE — TELEPHONE ENCOUNTER
VOICEMAIL  1. Caller Name: Thaila Horton                      Call Back Number: [phone] (home)     2. Message: Pt rtn call, asked for a cb.

## 2021-07-06 NOTE — TELEPHONE ENCOUNTER
Phone Number Called: [phone] (home)     Call outcome: Did not leave a detailed message. Requested patient to call back.    Message: LVM for Pt to cb about message below.

## 2021-07-10 ENCOUNTER — TELEPHONE (OUTPATIENT)
Dept: CARDIOLOGY | Facility: MEDICAL CENTER | Age: 86
End: 2021-07-10

## 2021-07-10 ENCOUNTER — PATIENT MESSAGE (OUTPATIENT)
Dept: CARDIOLOGY | Facility: MEDICAL CENTER | Age: 86
End: 2021-07-10

## 2021-07-10 NOTE — PATIENT COMMUNICATION
Gastroenterology requesting clearance for an EGD on date TBD with Dr. Rere Hopkins with deep propofol sedation.    Should patient have the procedure done at the GI outpatient Center or Hospital setting?    Please advise risk of procedure from a cardiac standpoint.     Clearance form in TW's paper in box in [place]

## 2021-07-15 ENCOUNTER — TELEPHONE (OUTPATIENT)
Dept: MEDICAL GROUP | Facility: PHYSICIAN GROUP | Age: 86
End: 2021-07-15

## 2021-07-15 DIAGNOSIS — R13.14 PHARYNGOESOPHAGEAL DYSPHAGIA: ICD-10-CM

## 2021-07-15 DIAGNOSIS — K21.9 GASTROESOPHAGEAL REFLUX DISEASE WITHOUT ESOPHAGITIS: ICD-10-CM

## 2021-07-15 NOTE — PATIENT COMMUNICATION
The patient may proceed to planned surgical intervention with a low (<1%) risk of kristopher-procedural cardiovascular events.     Outpatient or inpatient facility TW

## 2021-07-15 NOTE — TELEPHONE ENCOUNTER
VOICEMAIL  1. Caller Name: Thalia Horton                      Call Back Number: [phone] (home)     2. Message: Pt called to ask for a referral to [facility] with Dr Rere Hopkins for a throat procedure he will perform.  Pt asked for a cb to let her know if PCP could submit the referral.

## 2021-07-15 NOTE — TELEPHONE ENCOUNTER
Phone Number Called: [phone] (home)     Call outcome: Left detailed message for patient. Informed to call back with any additional questions.    Message: LVM for Pt about message below.

## 2021-07-31 DIAGNOSIS — I65.23 ATHEROSCLEROSIS OF BOTH CAROTID ARTERIES: ICD-10-CM

## 2021-07-31 DIAGNOSIS — E78.2 MIXED HYPERLIPIDEMIA: ICD-10-CM

## 2021-08-02 RX ORDER — EZETIMIBE 10 MG/1
TABLET ORAL
Qty: 90 TABLET | Refills: 3 | Status: SHIPPED | OUTPATIENT
Start: 2021-08-02 | End: 2022-03-16

## 2021-08-10 RX ORDER — METOPROLOL SUCCINATE 25 MG/1
TABLET, EXTENDED RELEASE ORAL
Qty: 90 TABLET | Refills: 3 | Status: SHIPPED | OUTPATIENT
Start: 2021-08-10 | End: 2021-11-29

## 2021-08-31 DIAGNOSIS — F33.41 RECURRENT MAJOR DEPRESSIVE DISORDER, IN PARTIAL REMISSION (HCC): ICD-10-CM

## 2021-08-31 DIAGNOSIS — E03.9 ACQUIRED HYPOTHYROIDISM: ICD-10-CM

## 2021-08-31 DIAGNOSIS — E78.2 MIXED HYPERLIPIDEMIA: ICD-10-CM

## 2021-08-31 RX ORDER — ESCITALOPRAM OXALATE 10 MG/1
10 TABLET ORAL DAILY
Qty: 90 TABLET | Refills: 0 | Status: SHIPPED | OUTPATIENT
Start: 2021-08-31 | End: 2021-09-08

## 2021-08-31 RX ORDER — LEVOTHYROXINE SODIUM 0.03 MG/1
TABLET ORAL
Qty: 90 TABLET | Refills: 0 | Status: SHIPPED | OUTPATIENT
Start: 2021-08-31 | End: 2021-09-08

## 2021-08-31 RX ORDER — ATORVASTATIN CALCIUM 20 MG/1
TABLET, FILM COATED ORAL
Qty: 90 TABLET | Refills: 0 | Status: SHIPPED | OUTPATIENT
Start: 2021-08-31 | End: 2021-09-08

## 2021-09-05 DIAGNOSIS — R09.89 RUNNY NOSE: ICD-10-CM

## 2021-09-07 RX ORDER — FLUTICASONE PROPIONATE 50 MCG
1 SPRAY, SUSPENSION (ML) NASAL DAILY
Qty: 16 G | Refills: 0 | Status: SHIPPED | OUTPATIENT
Start: 2021-09-07

## 2021-09-08 DIAGNOSIS — E03.9 ACQUIRED HYPOTHYROIDISM: ICD-10-CM

## 2021-09-08 DIAGNOSIS — F33.41 RECURRENT MAJOR DEPRESSIVE DISORDER, IN PARTIAL REMISSION (HCC): ICD-10-CM

## 2021-09-08 DIAGNOSIS — E78.2 MIXED HYPERLIPIDEMIA: ICD-10-CM

## 2021-09-09 RX ORDER — ATORVASTATIN CALCIUM 20 MG/1
TABLET, FILM COATED ORAL
Qty: 90 TABLET | Refills: 3 | Status: SHIPPED | OUTPATIENT
Start: 2021-09-09

## 2021-09-09 RX ORDER — LEVOTHYROXINE SODIUM 0.03 MG/1
TABLET ORAL
Qty: 90 TABLET | Refills: 3 | Status: SHIPPED | OUTPATIENT
Start: 2021-09-09

## 2021-09-09 RX ORDER — ESCITALOPRAM OXALATE 10 MG/1
10 TABLET ORAL DAILY
Qty: 90 TABLET | Refills: 3 | Status: SHIPPED | OUTPATIENT
Start: 2021-09-09

## 2021-10-05 RX ORDER — OMEPRAZOLE 20 MG/1
CAPSULE, DELAYED RELEASE ORAL
Qty: 90 CAPSULE | Refills: 0 | Status: SHIPPED | OUTPATIENT
Start: 2021-10-05 | End: 2021-10-12

## 2021-10-07 ENCOUNTER — APPOINTMENT (OUTPATIENT)
Dept: RADIOLOGY | Facility: MEDICAL CENTER | Age: 86
End: 2021-10-07
Attending: EMERGENCY MEDICINE
Payer: MEDICARE

## 2021-10-07 ENCOUNTER — HOSPITAL ENCOUNTER (EMERGENCY)
Facility: MEDICAL CENTER | Age: 86
End: 2021-10-07
Attending: EMERGENCY MEDICINE
Payer: MEDICARE

## 2021-10-07 VITALS
OXYGEN SATURATION: 94 % | RESPIRATION RATE: 16 BRPM | SYSTOLIC BLOOD PRESSURE: 106 MMHG | BODY MASS INDEX: 25.44 KG/M2 | WEIGHT: 149 LBS | DIASTOLIC BLOOD PRESSURE: 62 MMHG | HEART RATE: 72 BPM | HEIGHT: 64 IN | TEMPERATURE: 97.1 F
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
## 2021-10-07 DIAGNOSIS — S09.90XA CLOSED HEAD INJURY, INITIAL ENCOUNTER: ICD-10-CM

## 2021-10-07 DIAGNOSIS — S62.102A LEFT WRIST FRACTURE, CLOSED, INITIAL ENCOUNTER: ICD-10-CM

## 2021-10-07 LAB
ALBUMIN SERPL BCP-MCNC: 3.9 G/DL (ref 3.2–4.9)
ALBUMIN/GLOB SERPL: 1.4 G/DL
ALP SERPL-CCNC: 51 U/L (ref 30–99)
ALT SERPL-CCNC: 13 U/L (ref 2–50)
ANION GAP SERPL CALC-SCNC: 8 MMOL/L (ref 7–16)
AST SERPL-CCNC: 16 U/L (ref 12–45)
BASOPHILS # BLD AUTO: 0.4 % (ref 0–1.8)
BASOPHILS # BLD: 0.03 K/UL (ref 0–0.12)
BILIRUB SERPL-MCNC: 0.5 MG/DL (ref 0.1–1.5)
BUN SERPL-MCNC: 16 MG/DL (ref 8–22)
CALCIUM SERPL-MCNC: 9.5 MG/DL (ref 8.5–10.5)
CHLORIDE SERPL-SCNC: 107 MMOL/L (ref 96–112)
CO2 SERPL-SCNC: 28 MMOL/L (ref 20–33)
CREAT SERPL-MCNC: 0.61 MG/DL (ref 0.5–1.4)
EKG IMPRESSION: NORMAL
EOSINOPHIL # BLD AUTO: 0.04 K/UL (ref 0–0.51)
EOSINOPHIL NFR BLD: 0.5 % (ref 0–6.9)
ERYTHROCYTE [DISTWIDTH] IN BLOOD BY AUTOMATED COUNT: 50.5 FL (ref 35.9–50)
GLOBULIN SER CALC-MCNC: 2.8 G/DL (ref 1.9–3.5)
GLUCOSE SERPL-MCNC: 116 MG/DL (ref 65–99)
HCT VFR BLD AUTO: 42.5 % (ref 37–47)
HGB BLD-MCNC: 14.1 G/DL (ref 12–16)
IMM GRANULOCYTES # BLD AUTO: 0.03 K/UL (ref 0–0.11)
IMM GRANULOCYTES NFR BLD AUTO: 0.4 % (ref 0–0.9)
LYMPHOCYTES # BLD AUTO: 1.11 K/UL (ref 1–4.8)
LYMPHOCYTES NFR BLD: 14.4 % (ref 22–41)
MCH RBC QN AUTO: 33.1 PG (ref 27–33)
MCHC RBC AUTO-ENTMCNC: 33.2 G/DL (ref 33.6–35)
MCV RBC AUTO: 99.8 FL (ref 81.4–97.8)
MONOCYTES # BLD AUTO: 0.61 K/UL (ref 0–0.85)
MONOCYTES NFR BLD AUTO: 7.9 % (ref 0–13.4)
NEUTROPHILS # BLD AUTO: 5.88 K/UL (ref 2–7.15)
NEUTROPHILS NFR BLD: 76.4 % (ref 44–72)
NRBC # BLD AUTO: 0 K/UL
NRBC BLD-RTO: 0 /100 WBC
PLATELET # BLD AUTO: 170 K/UL (ref 164–446)
PMV BLD AUTO: 9.4 FL (ref 9–12.9)
POTASSIUM SERPL-SCNC: 4.1 MMOL/L (ref 3.6–5.5)
PROT SERPL-MCNC: 6.7 G/DL (ref 6–8.2)
RBC # BLD AUTO: 4.26 M/UL (ref 4.2–5.4)
SODIUM SERPL-SCNC: 143 MMOL/L (ref 135–145)
TROPONIN T SERPL-MCNC: 6 NG/L (ref 6–19)
WBC # BLD AUTO: 7.7 K/UL (ref 4.8–10.8)

## 2021-10-07 PROCEDURE — 99284 EMERGENCY DEPT VISIT MOD MDM: CPT

## 2021-10-07 PROCEDURE — 700101 HCHG RX REV CODE 250

## 2021-10-07 PROCEDURE — 73110 X-RAY EXAM OF WRIST: CPT | Mod: LT

## 2021-10-07 PROCEDURE — 99285 EMERGENCY DEPT VISIT HI MDM: CPT

## 2021-10-07 PROCEDURE — 85025 COMPLETE CBC W/AUTO DIFF WBC: CPT

## 2021-10-07 PROCEDURE — 25605 CLTX DST RDL FX/EPHYS SEP W/: CPT

## 2021-10-07 PROCEDURE — 73100 X-RAY EXAM OF WRIST: CPT | Mod: LT

## 2021-10-07 PROCEDURE — 70450 CT HEAD/BRAIN W/O DYE: CPT | Mod: MG

## 2021-10-07 PROCEDURE — 93005 ELECTROCARDIOGRAM TRACING: CPT | Performed by: EMERGENCY MEDICINE

## 2021-10-07 PROCEDURE — 80053 COMPREHEN METABOLIC PANEL: CPT

## 2021-10-07 PROCEDURE — 84484 ASSAY OF TROPONIN QUANT: CPT

## 2021-10-07 RX ORDER — LIDOCAINE HYDROCHLORIDE 20 MG/ML
INJECTION, SOLUTION INFILTRATION; PERINEURAL
Status: COMPLETED
Start: 2021-10-07 | End: 2021-10-07

## 2021-10-07 RX ADMIN — LIDOCAINE HYDROCHLORIDE 10 ML: 20 INJECTION, SOLUTION INFILTRATION; PERINEURAL at 11:37

## 2021-10-07 ASSESSMENT — FIBROSIS 4 INDEX: FIB4 SCORE: 2.09

## 2021-10-07 NOTE — DISCHARGE INSTRUCTIONS
The splint stays on at all times until seen by the specialist. Call today early tomorrow morning to get your follow-up appointment so you can get a cast place. You may also need surgery. Ice and elevate your wrist. Any worsening pain, persistent numbness that lasts more than 12 hours, any new or different symptoms or weakness return. I hope you feel better soon. Take Tylenol for pain.

## 2021-10-07 NOTE — ED NOTES
Pt verbalizes understanding of discharge instructions and follow up/prescription . Pt able to ambulate out to ED lobby with personal 4 wheel walker with all belongings.

## 2021-10-07 NOTE — ED TRIAGE NOTES
"Chief Complaint   Patient presents with   • T-5000 GLF     Pt BIB EMS after falling at [facility] Pt fell on left arm, splinted by EMS, CMS intact, pt can move all extremities. A&Ox4. pt states she did hit the left side of her head during the fall. Pt does not take blood thinners. No obvious deformities to patient's head.      /62   Pulse 82   Temp 35.9 °C (96.6 °F) (Temporal)   Resp 16   Ht 1.626 m (5' 4\")   Wt 67.6 kg (149 lb)   LMP  (LMP Unknown)   SpO2 92%   BMI 25.58 kg/m²       "

## 2021-10-07 NOTE — ED PROVIDER NOTES
"ED Provider Note    Scribed for Roseline Harding M.D. by Adelaide Schuster. 10/7/2021, 10:47 AM.    Primary care provider: DOE Paiz  Means of arrival: EMS  History obtained from: patient/EMS  History limited by: none    CHIEF COMPLAINT  Chief Complaint   Patient presents with   • T-5000 GLF     Pt BIB EMS after falling at her [facility] Pt fell on left arm, splinted by EMS, CMS intact, pt can move all extremities. A&Ox4. pt states she did hit the left side of her head during the fall. Pt does not take blood thinners. No obvious deformities to patient's head.        HPI  Thalia Horton is a 89 y.o. female who presents to the Emergency Department via EMS for left wrist pain and evaluation of possible TBI following a ground level fall this morning. Patient states she \"tripped over her feet\" and fell to the ground. She did hit her head on carpet but denies any loss of consciousness or head pain. She further denies any nausea, vomiting, dizziness, neck pain, or back pain. No preceding dizziness or chest pain or history that suggests syncope. She denies any numbness or tingling in her hand. No other joint injury.    REVIEW OF SYSTEMS  Pertinent positives include left wrist pain and ground level fall. Pertinent negatives include no loss of consciousness, head pain, nausea, vomiting, dizziness, neck pain, numbness, tingling, weakness or back pain. All other systems reviewed and negative.     PAST MEDICAL HISTORY   has a past medical history of Chickenpox, COPD (chronic obstructive pulmonary disease) (HCC), GERD (gastroesophageal reflux disease), Sinhala measles, Heart palpitations, Hyperlipidemia, Osteoarthritis, and Thyroid disease.    SURGICAL HISTORY   has a past surgical history that includes hip replacement, total (Left).    SOCIAL HISTORY  Social History     Tobacco Use   • Smoking status: Former Smoker     Packs/day: 1.00     Years: 55.00     Pack years: 55.00     " Types: Cigarettes     Quit date: 2013     Years since quittin.2   • Smokeless tobacco: Never Used   Vaping Use   • Vaping Use: Never used   Substance Use Topics   • Alcohol use: Yes     Alcohol/week: 6.0 oz     Types: 10 Glasses of wine per week     Comment: 1 -2    • Drug use: No      Social History     Substance and Sexual Activity   Drug Use No       FAMILY HISTORY  Family History   Problem Relation Age of Onset   • Stroke Mother 69       CURRENT MEDICATIONS  Current Outpatient Medications:   •  omeprazole (PRILOSEC) 20 MG delayed-release capsule, TAKE 1 CAPSULE BY MOUTH EVERY DAY, Disp: 90 Capsule, Rfl: 0  •  atorvastatin (LIPITOR) 20 MG Tab, TAKE 1 TABLET BY MOUTH EVERY DAY FOR CHOLESTEROL, Disp: 90 Tablet, Rfl: 3  •  escitalopram (LEXAPRO) 10 MG Tab, TAKE 1 TABLET BY MOUTH EVERY DAY, Disp: 90 Tablet, Rfl: 3  •  levothyroxine (SYNTHROID) 25 MCG Tab, TAKE 1 TABLET BY MOUTH EVERY MORNING ON AN EMPTY STOMACH FOR THYROID, Disp: 90 Tablet, Rfl: 3  •  fluticasone (FLONASE) 50 MCG/ACT nasal spray, ADMINISTER 1 SPRAY INTO AFFECTED NOSTRIL(S) EVERY DAY, Disp: 16 g, Rfl: 0  •  metoprolol SR (TOPROL XL) 25 MG TABLET SR 24 HR, TAKE 1 TABLET BY MOUTH EVERY DAY, Disp: 90 tablet, Rfl: 3  •  ezetimibe (ZETIA) 10 MG Tab, TAKE 1 TABLET BY MOUTH EVERY DAY FOR CHOLESTEROL/ STROKE PREVENTION, Disp: 90 tablet, Rfl: 3  •  Cholecalciferol (VITAMIN D3) 2000 UNIT Cap, Take 1 Cap by mouth every day., Disp: , Rfl:   •  Cyanocobalamin (VITAMIN B-12) 1000 MCG Tab, Take 1,000 mcg by mouth every day., Disp: , Rfl:   •  HYDROcodone-acetaminophen (NORCO) 5-325 MG Tab per tablet, TK 1 T PO  Q 4 H FOR 3 DAYS, Disp: , Rfl:   •  ASPIRIN 81 PO, Take 81 mg by mouth every day., Disp: , Rfl:   •  Multiple Vitamins-Minerals (MULTIVITAMIN ADULT PO), Take 1 Tab by mouth every day. Indications: Centrum, Disp: , Rfl:   •  albuterol 108 (90 Base) MCG/ACT Aero Soln inhalation aerosol, INHALE 2 PUFFS BY MOUTH EVERY 6 HOURS AS NEEDED FOR SHORTNESS OF  "BREATH, Disp: 1 Inhaler, Rfl: 6     ALLERGIES  Allergies   Allergen Reactions   • Penicillins Vomiting       PHYSICAL EXAM  VITAL SIGNS: /62   Pulse 82   Temp 35.9 °C (96.6 °F) (Temporal)   Resp 16   Ht 1.626 m (5' 4\")   Wt 67.6 kg (149 lb)   LMP  (LMP Unknown)   SpO2 92%   BMI 25.58 kg/m²     Constitutional: Well developed, No acute distress, Non-toxic appearance.   HENT: Normocephalic, Atraumatic, Bilateral external ears normal, Nose normal.   Eyes: PERRL, EOMI, Conjunctiva normal.    Neck: Normal range of motion, No cervical tenderness, Supple.     Cardiovascular: Normal heart rate, Normal rhythm.    Thorax & Lungs: Normal breath sounds, No respiratory distress. No chest tenderness.    Abdomen: Benign abdominal exam, no tenderness, no distention, no guarding, no rebound.    Skin: Warm, Dry, No erythema, No rash. No abrasions.   Back: No thoracic or lumbar tenderness, No CVA tenderness.   Extremities: Obvious deformity to left wrist, +2 radial pulse, good cap refill, able to wiggle fingers, all other joints ranged without pain, Intact distal pulses, No edema.   Neurologic: Alert & oriented x 3, Normal motor function, Normal sensory function, No focal deficits noted.   Psychiatric: Appropriate                                                      DIAGNOSTIC STUDIES / PROCEDURES\    Joint Reduction Procedure Note    Indication: fracture    Consent: The patient was counseled regarding the procedure, it's indications, risks, potential complications and alternatives and any questions were answered. Consent was obtained.    Procedure: The pre-reduction exam showed distal perfusion & neurologic function to be normal. The patient was placed in the appropriate position. Anesthesia/pain control was obtained using a hematoma block using 8.0 cc of 2% Lidocaine without epinephrine. Reduction of the left wrist was performed by direct traction. Post reduction films were obtained and revealed partial but satisfactory " reduction. A post-reduction exam revealed distal perfusion & neurologic function to be normal. The affected area was immobilized with sugar tong splint.    The patient tolerated the procedure well.    Complications: None    LABS  Results for orders placed or performed during the hospital encounter of 10/07/21   CBC WITH DIFFERENTIAL   Result Value Ref Range    WBC 7.7 4.8 - 10.8 K/uL    RBC 4.26 4.20 - 5.40 M/uL    Hemoglobin 14.1 12.0 - 16.0 g/dL    Hematocrit 42.5 37.0 - 47.0 %    MCV 99.8 (H) 81.4 - 97.8 fL    MCH 33.1 (H) 27.0 - 33.0 pg    MCHC 33.2 (L) 33.6 - 35.0 g/dL    RDW 50.5 (H) 35.9 - 50.0 fL    Platelet Count 170 164 - 446 K/uL    MPV 9.4 9.0 - 12.9 fL    Neutrophils-Polys 76.40 (H) 44.00 - 72.00 %    Lymphocytes 14.40 (L) 22.00 - 41.00 %    Monocytes 7.90 0.00 - 13.40 %    Eosinophils 0.50 0.00 - 6.90 %    Basophils 0.40 0.00 - 1.80 %    Immature Granulocytes 0.40 0.00 - 0.90 %    Nucleated RBC 0.00 /100 WBC    Neutrophils (Absolute) 5.88 2.00 - 7.15 K/uL    Lymphs (Absolute) 1.11 1.00 - 4.80 K/uL    Monos (Absolute) 0.61 0.00 - 0.85 K/uL    Eos (Absolute) 0.04 0.00 - 0.51 K/uL    Baso (Absolute) 0.03 0.00 - 0.12 K/uL    Immature Granulocytes (abs) 0.03 0.00 - 0.11 K/uL    NRBC (Absolute) 0.00 K/uL   Comp Metabolic Panel   Result Value Ref Range    Sodium 143 135 - 145 mmol/L    Potassium 4.1 3.6 - 5.5 mmol/L    Chloride 107 96 - 112 mmol/L    Co2 28 20 - 33 mmol/L    Anion Gap 8.0 7.0 - 16.0    Glucose 116 (H) 65 - 99 mg/dL    Bun 16 8 - 22 mg/dL    Creatinine 0.61 0.50 - 1.40 mg/dL    Calcium 9.5 8.5 - 10.5 mg/dL    AST(SGOT) 16 12 - 45 U/L    ALT(SGPT) 13 2 - 50 U/L    Alkaline Phosphatase 51 30 - 99 U/L    Total Bilirubin 0.5 0.1 - 1.5 mg/dL    Albumin 3.9 3.2 - 4.9 g/dL    Total Protein 6.7 6.0 - 8.2 g/dL    Globulin 2.8 1.9 - 3.5 g/dL    A-G Ratio 1.4 g/dL   TROPONIN   Result Value Ref Range    Troponin T 6 6 - 19 ng/L   ESTIMATED GFR   Result Value Ref Range    GFR If  >60 >60  mL/min/1.73 m 2    GFR If Non African American >60 >60 mL/min/1.73 m 2   EKG (NOW)   Result Value Ref Range    Report       [facility] Emergency Dept.    Test Date:  2021-10-07  Pt Name:    ANAY HARDIN              Department: ER  MRN:        [id number]                      Room:       RD 02 H  Gender:     Female                       Technician: [id number]  :        [id number]                   Requested By:ROSELINE CONDON  Order #:    [id number]                    Reading MD: Roseline Chavez MD    Measurements  Intervals                                Axis  Rate:       68                           P:          75  NE:         156                          QRS:        -45  QRSD:       90                           T:          23  QT:         412  QTc:        439    Interpretive Statements  SINUS RHYTHM  LEFT ANTERIOR FASCICULAR BLOCK  CONSIDER ANTEROSEPTAL INFARCT  Compared to ECG 05/15/2020 11:14:50  Electronically Signed On 10-7-2021 14:39:24 PDT by Roseline Chavez MD       All labs reviewed by me.     EKG  12 lead EKG interpreted by me as above.     RADIOLOGY  DX-WRIST-LIMITED 2- LEFT   Final Result      1.  Interval cast placement with improved alignment of the distal radioulnar joint.      DX-WRIST-COMPLETE 3+ LEFT   Final Result      1.  There is a comminuted intra-articular impacted fracture of the distal left radius.   2.  There is a distal ulnar styloid fracture.   3.  There is disruption of the distal radioulnar joint.      CT-HEAD W/O   Final Result      1.  There is no acute intracranial hemorrhage or infarct.      2.  White matter lucencies most consistent with small vessel ischemic change versus demyelination or gliosis.      3.  There is cerebral atrophy.        The radiologist's interpretation of all radiological studies have been reviewed by me.    COURSE & MEDICAL DECISION MAKING  Nursing notes, VS, PMSFHx reviewed in chart.     It was initially evaluated for the TBI. A CT was  ordered due to her age. She is not on anticoagulation. She denies any neck pain or low back pain. She has obvious deformity to her left wrist but no other injuries noted. History does not suggest syncope. She does neurovascular intact.    10:47 AM Patient seen and examined at bedside. The patient presents with left wrist pain following a ground level fall. No history to suggest syncope. Ordered for CT-head and DX-wrist to evaluate.     11:14 AM - Paged orthopedics.     11:15 AM - Patient reevaluated at bedside and updated on results of the studies.     11:28 AM I discussed the patient's case and the above findings with Dr. Bryan (Ortho) who recommends me reducing the fracture.     11:30 AM - After walking to the bathroom the patient became dizzy. Unclear if this is due to pain or another etiology. Will add labs and EKG to evaluate.     11:32 AM - Patient medicated with lidocaine 2%. A hematoma block was performed due to the patient's age.    11:42 AM - Fracture reduction performed by me. See the procedure note. It is neurovascular intact after reduction. She complains of some tingling in her fingers but I think that's likely due to the hematoma block.    Labs showed no evidence of anemia. A normal white count. No significant electrolyte abnormalities in a normal troponin. Head CT showed no acute abnormalities. Patient has been able to be up and ambulating and has a stable gait. She seems better now that her wrist is splinted. She had partial reduction of her wrist on x-ray. She will follow-up with Dr. Bryan. She remains neurovascular intact except for some tingling in her fingers which again I think is due to the hematoma block. She has good cap refill. Her thumb now has regained feeling.    2:38 PM - Patient was able to ambulate with walker.     The patient will return for new or worsening symptoms and is stable at the time of discharge.    The patient is referred to a primary physician for blood pressure  management, diabetic screening, and for all other preventative health concerns.    DISPOSITION:  Patient will be discharged home in stable condition.    FOLLOW UP:  Jean Bryan M.D.  [address] [facility] [address]  [phone]    Schedule an appointment as soon as possible for a visit   If symptoms worsen, return to the er.      FINAL IMPRESSION  1. Fall, initial encounter    2. Closed head injury, initial encounter    3. Left wrist fracture, closed, initial encounter          Adelaide ROLDAN (Sophia), am scribing for, and in the presence of, Roseline Harding M.D..    Electronically signed by: Adelaide Schuster (Sophia), 10/7/2021    Roseline ROLDAN M.D. personally performed the services described in this documentation, as scribed by Adelaide Schuster in my presence, and it is both accurate and complete.    The note accurately reflects work and decisions made by me.  Roseline Harding M.D.  10/7/2021  6:07 PM

## 2021-10-08 ENCOUNTER — TELEPHONE (OUTPATIENT)
Dept: MEDICAL GROUP | Facility: PHYSICIAN GROUP | Age: 86
End: 2021-10-08

## 2021-10-08 NOTE — TELEPHONE ENCOUNTER
Phone Number Called: [phone]    Call outcome: Left detailed message for patient. Informed to call back Nurse Martines at [phone] with any additional questions.    Message:  Need to schedule a follow-up visit with your primary care provider following your emergency room visit on 10/7.

## 2021-10-08 NOTE — TELEPHONE ENCOUNTER
Phone conversation with patient's son & scheduled follow-up visit with primary care provider related to recent ED visit.

## 2021-10-12 ENCOUNTER — OFFICE VISIT (OUTPATIENT)
Dept: MEDICAL GROUP | Facility: PHYSICIAN GROUP | Age: 86
End: 2021-10-12
Payer: MEDICARE

## 2021-10-12 VITALS
WEIGHT: 146 LBS | BODY MASS INDEX: 24.92 KG/M2 | RESPIRATION RATE: 24 BRPM | HEIGHT: 64 IN | SYSTOLIC BLOOD PRESSURE: 100 MMHG | HEART RATE: 72 BPM | TEMPERATURE: 97.4 F | OXYGEN SATURATION: 93 % | DIASTOLIC BLOOD PRESSURE: 70 MMHG
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
## 2021-10-12 DIAGNOSIS — R26.89 BALANCE PROBLEM: ICD-10-CM

## 2021-10-12 PROCEDURE — 99213 OFFICE O/P EST LOW 20 MIN: CPT | Performed by: STUDENT IN AN ORGANIZED HEALTH CARE EDUCATION/TRAINING PROGRAM

## 2021-10-12 ASSESSMENT — FIBROSIS 4 INDEX: FIB4 SCORE: 2.35

## 2021-10-13 NOTE — PROGRESS NOTES
CC:  Diagnoses of Balance problem and Fall, subsequent encounter were pertinent to this visit.    HISTORY OF THE PRESENT ILLNESS: Patient is a 89 y.o. female. This pleasant patient is here today to discuss some balance problems with falls.  She is here today with her son who cares for her.  Her  PCP is Marcelo SHORT.    1.  Balance problems with falls  This has been an increasing problem over the last several weeks.  On 10/7/2021 the patient ported to ER to a fall that resulted in a distal left radius fracture and a distal ulnar styloid fracture.  The patient states that frequently when rising from sitting she has short period of time where she has a predisposition towards losing her balance.  She describes this as a feeling of lightheadedness.  None of these recent falls have been the result of throw rugs or other similar obstacles within her home.    She does wear eyeglasses with progressive lenses.    No problem-specific Assessment & Plan notes found for this encounter.      Current Outpatient Medications Ordered in Epic   Medication Sig Dispense Refill   • PANTOPRAZOLE SODIUM PO Take 40 mg by mouth every day.     • atorvastatin (LIPITOR) 20 MG Tab TAKE 1 TABLET BY MOUTH EVERY DAY FOR CHOLESTEROL 90 Tablet 3   • escitalopram (LEXAPRO) 10 MG Tab TAKE 1 TABLET BY MOUTH EVERY DAY 90 Tablet 3   • levothyroxine (SYNTHROID) 25 MCG Tab TAKE 1 TABLET BY MOUTH EVERY MORNING ON AN EMPTY STOMACH FOR THYROID 90 Tablet 3   • fluticasone (FLONASE) 50 MCG/ACT nasal spray ADMINISTER 1 SPRAY INTO AFFECTED NOSTRIL(S) EVERY DAY 16 g 0   • metoprolol SR (TOPROL XL) 25 MG TABLET SR 24 HR TAKE 1 TABLET BY MOUTH EVERY DAY 90 tablet 3   • ezetimibe (ZETIA) 10 MG Tab TAKE 1 TABLET BY MOUTH EVERY DAY FOR CHOLESTEROL/ STROKE PREVENTION 90 tablet 3   • Cholecalciferol (VITAMIN D3) 2000 UNIT Cap Take 1 Cap by mouth every day.     • Cyanocobalamin (VITAMIN B-12) 1000 MCG Tab Take 1,000 mcg by mouth every day.     • ASPIRIN 81 PO Take  "81 mg by mouth every day.     • Multiple Vitamins-Minerals (MULTIVITAMIN ADULT PO) Take 1 Tab by mouth every day. Indications: Centrum     • albuterol 108 (90 Base) MCG/ACT Aero Soln inhalation aerosol INHALE 2 PUFFS BY MOUTH EVERY 6 HOURS AS NEEDED FOR SHORTNESS OF BREATH 1 Inhaler 6     No current Epic-ordered facility-administered medications on file.     ROS:   Gen: no fevers/chills, unplanned changes in weight  Eyes: Vision at baseline  Pulm: no sob, no cough  CV: no chest pain/pressure, no palpitations  MSk: no myalgias  Skin: no rash  Neuro: no headaches, no numbness/tingling    Objective:     Exam: /70 (BP Location: Right arm, Patient Position: Standing, BP Cuff Size: Adult)   Pulse 72   Temp 36.3 °C (97.4 °F) (Temporal)   Resp (!) 24   Ht 1.626 m (5' 4\")   Wt 66.2 kg (146 lb)   SpO2 93%  Body mass index is 25.06 kg/m².    Orthostatic measurements   1) 144/78 heart rate 74  2) 124/68 hr 80  3) 100/70 hr80    General: Normal appearing. No distress.  HEENT: Normocephalic. Eyes conjunctiva clear lids without ptosis, pupils equal and reactive to light accommodation, ears normal shape and contour, canals are clear bilaterally, tympanic membranes are benign, nasal mucosa benign, oropharynx is without erythema, edema or exudates.   Pulmonary: Clear to ausculation.  Normal effort. No rales, ronchi, or wheezing.  Cardiovascular: Regular rate and rhythm without murmur. Radial pulses are intact and equal bilaterally.  Neurologic: Grossly nonfocal  Skin: Warm and dry.  No obvious lesions.  Musculoskeletal: Normal gait. No extremity cyanosis, clubbing, or edema.    Labs:   10/7/2021:  -CBC showing elevated MCV of 99.8  -CMP showing elevated fasting blood glucose of 116    Assessment & Plan:   89 y.o. female with the following -    1. Balance problem  -Acute.  -Strong indication that this is orthostatic hypotension.  -I have asked the patient to increase her sodium chloride intake.  She had been on a sodium " restricted diet due to her community living circumstances where pretty much everyone was sodium restricted therefore she was sodium restricted her as well.  Maintain good hydration.  Move between laying and sitting sitting and standing slowly and with support.  - REFERRAL TO PHYSICAL THERAPY for wheelchair evaluation.      Return if symptoms worsen or fail to improve.    Please note that this dictation was created using voice recognition software. I have made every reasonable attempt to correct obvious errors, but I expect that there are errors of grammar and possibly content that I did not discover before finalizing the note.    Jadiel Tolentino PA-C 10/12/2021

## 2021-10-21 ENCOUNTER — PATIENT MESSAGE (OUTPATIENT)
Dept: MEDICAL GROUP | Facility: PHYSICIAN GROUP | Age: 86
End: 2021-10-21

## 2021-10-21 DIAGNOSIS — R26.89 BALANCE PROBLEM: ICD-10-CM
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
## 2021-10-21 DIAGNOSIS — G31.84 MILD COGNITIVE IMPAIRMENT: ICD-10-CM

## 2021-10-21 RX ORDER — PANTOPRAZOLE SODIUM 40 MG/1
40 TABLET, DELAYED RELEASE ORAL DAILY
COMMUNITY
Start: 2021-10-04

## 2021-10-21 NOTE — TELEPHONE ENCOUNTER
Phone Number Called: [phone] (home)     Call outcome: Did not leave a detailed message. Requested patient to call back.    Message: CB to schedule sooner appt

## 2021-10-25 ENCOUNTER — TELEPHONE (OUTPATIENT)
Dept: MEDICAL GROUP | Facility: PHYSICIAN GROUP | Age: 86
End: 2021-10-25

## 2021-10-26 NOTE — TELEPHONE ENCOUNTER
Nimesh Nunn is a 65 y.o.  male patient, who presents for a 6 minute walk test ordered by MD Zach.  The diagnosis is Qualify for Oxygen.  The patient's BMI is 23.5kg/m2. Predicted distance (lower limit of normal) is 404.06 meters.    Test Results:    The test was completed without stopping.   The total time walked was 360 seconds.  During walking, the patient reported:  Dyspnea, Other (Comment)(muscle weakness). The patient used a wheelchair during testing.     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      CLINICAL INTERPRETATION:  Six minute walk distance is 223.11 meters (732 feet) with somewhat heavy dyspnea.  During exercise, there was no significant desaturation while breathing supplemental oxygen.  Both blood pressure and heart rate remained stable with walking.  Tachycardia was present prior to exercise.  The patient did not report non-pulmonary symptoms during exercise.  Significant exercise impairment is likely due to subjective symptoms.  No previous study performed.  Based upon age and body mass index, exercise capacity is less than predicted.   Oxygen saturation did improve while breathing supplemental oxygen.     VOICEMAIL  1. Caller Name: Paras Méndez                      Call Back Number: [phone]    2. Message: Called to inform Pt would need an update of phone numbers on file, in the future for assistance with booking appointments.

## 2021-11-02 ENCOUNTER — OFFICE VISIT (OUTPATIENT)
Dept: MEDICAL GROUP | Facility: PHYSICIAN GROUP | Age: 86
End: 2021-11-02
Payer: MEDICARE

## 2021-11-02 VITALS
WEIGHT: 145.2 LBS | OXYGEN SATURATION: 93 % | DIASTOLIC BLOOD PRESSURE: 68 MMHG | SYSTOLIC BLOOD PRESSURE: 122 MMHG | HEIGHT: 64 IN | TEMPERATURE: 97.4 F | HEART RATE: 84 BPM | BODY MASS INDEX: 24.79 KG/M2

## 2021-11-02 DIAGNOSIS — S42.302D CLOSED FRACTURE OF LEFT UPPER EXTREMITY WITH ROUTINE HEALING: ICD-10-CM

## 2021-11-02 DIAGNOSIS — I10 ESSENTIAL HYPERTENSION: ICD-10-CM

## 2021-11-02 DIAGNOSIS — R55 NEAR SYNCOPE: ICD-10-CM
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
## 2021-11-02 DIAGNOSIS — I95.1 ORTHOSTATIC HYPOTENSION: ICD-10-CM

## 2021-11-02 DIAGNOSIS — R26.89 BALANCE PROBLEM: ICD-10-CM

## 2021-11-02 DIAGNOSIS — R39.15 URINARY URGENCY: ICD-10-CM

## 2021-11-02 LAB
APPEARANCE UR: CLEAR
BILIRUB UR STRIP-MCNC: NORMAL MG/DL
COLOR UR AUTO: NORMAL
GLUCOSE UR STRIP.AUTO-MCNC: NEGATIVE MG/DL
KETONES UR STRIP.AUTO-MCNC: NEGATIVE MG/DL
LEUKOCYTE ESTERASE UR QL STRIP.AUTO: NEGATIVE
NITRITE UR QL STRIP.AUTO: NEGATIVE
PH UR STRIP.AUTO: 5.5 [PH] (ref 5–8)
PROT UR QL STRIP: NEGATIVE MG/DL
RBC UR QL AUTO: NEGATIVE
SP GR UR STRIP.AUTO: 1.03
UROBILINOGEN UR STRIP-MCNC: 1 MG/DL

## 2021-11-02 PROCEDURE — 99215 OFFICE O/P EST HI 40 MIN: CPT | Performed by: NURSE PRACTITIONER

## 2021-11-02 PROCEDURE — 81002 URINALYSIS NONAUTO W/O SCOPE: CPT | Performed by: NURSE PRACTITIONER

## 2021-11-02 ASSESSMENT — FIBROSIS 4 INDEX: FIB4 SCORE: 2.35

## 2021-11-03 ENCOUNTER — TELEPHONE (OUTPATIENT)
Dept: MEDICAL GROUP | Facility: PHYSICIAN GROUP | Age: 86
End: 2021-11-03

## 2021-11-03 NOTE — ASSESSMENT & PLAN NOTE
-Urinalysis is within normal limits no evidence of infection  -We discussed possible medication contributors and discussed that she is not on anything specific that would likely be causing her symptoms  -We discussed possible urology referral and follow-up which patient declines at this time  -She is requesting medication, provider will research options and see if there is anything that she would be able to take safely

## 2021-11-03 NOTE — ASSESSMENT & PLAN NOTE
-Decrease metoprolol to 12.5 mg p.o. daily  -Send blood pressures and pulses via MyChart, will discuss the potential of discontinuing the medication  -Discussed potential cardiology referral related to symptoms potentially consistent with syncope or near syncopal episodes/possible arrhythmia patient has been resistant to cardiology referral and work-up in the past.

## 2021-11-03 NOTE — TELEPHONE ENCOUNTER
Phone Number Called: [phone] (home)     Call outcome: Spoke with Katherine Lin    Message: updated phone numbers per request.

## 2021-11-03 NOTE — ASSESSMENT & PLAN NOTE
-Referral to home health for evaluation  -Patient does require some PT for strengthening  -Referral to cardiology related to near syncopal episodes and some concern for arrhythmia

## 2021-11-03 NOTE — TELEPHONE ENCOUNTER
VOICEMAIL  1. Caller Name: Malinda Nurse Manager with [facility]                      Call Back Number: [phone]    2. Message: Asked for a cb about the Pt visit from yesterday.

## 2021-11-03 NOTE — TELEPHONE ENCOUNTER
Phone Number Called: [phone]    Call outcome: Spoke with Malinda    Message: She asked for a copy of the Progress/visit notes from the office appt with the PCP on 11/02/21.  Informed her would fax them for her today.  Confirmed that the notes did state it was a face to face visit with need for Home Health.  Informed Malinda would fax, and asked for the fax number.  Fax number provided [phone].

## 2021-11-03 NOTE — PROGRESS NOTES
Subjective:     Chief Complaint   Patient presents with   • Follow-Up     Broken Wrist , Oct 7th, left wrist   • Bladder Problem     on going, recently worsening,   • Medication Management     Pantoprazole,         HPI:   Thalia presents today with     Problem   Urinary Urgency    This is a new issue. States having significant leakage as well as urge incontinence.  Per the report of her son she does hold that for long amounts of times when she initially feels like she has to pee but she states she is unable to hold it. Not drinking enough water per her report to avoid urinary leakage she is only drinking a couple of cups of water a day.  States that it has been happening for a few months, states worse since it started.  But that it may have gotten slowly worse over time.     Closed Fracture of Left Upper Extremity With Routine Healing    Patient continues follow-up with orthopedics.  This is overall healing well and patient is using over-the-counter medications for pain.  This is contributing to her balance issues as she is having difficulty using her walker.  As well as stating that the cast is weighing her down on that side.     Near Syncope    This is a worsening issue.  Despite patient report and ER that she did not experience syncopal symptoms she is currently reporting, dizziness on standing as well as darkening vision contributing to her recent fall as well as other falls.  We have discussed potential cardiology In the past and patient is finally amenable to follow-up.     Fall    Most recent fall 10/7/2021     Balance Problem    Chronic in nature.  Worsening.  Despite denying any syncopal-like symptoms in the emergency room she does state that she has been having issues with severe dizziness and darkening of vision on standing, states that this generally happens when she stands up quickly and states that she does have trouble remembering to sit and then stand slowly.  She has had a couple of episodes in the  past that are concerning for possible arrhythmia, but has refused cardiology follow-up in the fast.  She has been more sedentary and does have some deconditioning, gait is overall stable. currently she states she is staying with family on the [place] side of [place]. States she is doing some exercises for strengthening in her hand.  Is requesting further PT for balance/stability this was helpful a few years ago.  Will need OT essentially, states she is having more difficulty with ambulation as she is unable to use walker r/t the cast. Has help at home. States that she is healing ok.      Essential Hypertension    Chronic in nature.  Blood pressure today is 122/68 and her pulse is 84.  She is taking metoprolol 25 mg p.o. daily.  We discussed decreasing the dose of metoprolol today related to lower blood pressures, recent falls.  At her last appointment Jadiel Tolentino did indicate concern for orthostatic hypotension and did recommend that she increase salt in her diet as she had been on a no salt diet in the facility she was living.  Patient denies any chest pain, palpitations, blurry vision.         Current Outpatient Medications Ordered in Epic   Medication Sig Dispense Refill   • pantoprazole (PROTONIX) 40 MG Tablet Delayed Response Take 40 mg by mouth every day.     • PANTOPRAZOLE SODIUM PO Take 40 mg by mouth every day.     • atorvastatin (LIPITOR) 20 MG Tab TAKE 1 TABLET BY MOUTH EVERY DAY FOR CHOLESTEROL 90 Tablet 3   • escitalopram (LEXAPRO) 10 MG Tab TAKE 1 TABLET BY MOUTH EVERY DAY 90 Tablet 3   • levothyroxine (SYNTHROID) 25 MCG Tab TAKE 1 TABLET BY MOUTH EVERY MORNING ON AN EMPTY STOMACH FOR THYROID 90 Tablet 3   • fluticasone (FLONASE) 50 MCG/ACT nasal spray ADMINISTER 1 SPRAY INTO AFFECTED NOSTRIL(S) EVERY DAY 16 g 0   • metoprolol SR (TOPROL XL) 25 MG TABLET SR 24 HR TAKE 1 TABLET BY MOUTH EVERY DAY 90 tablet 3   • ezetimibe (ZETIA) 10 MG Tab TAKE 1 TABLET BY MOUTH EVERY DAY FOR CHOLESTEROL/ STROKE PREVENTION  "90 tablet 3   • Cholecalciferol (VITAMIN D3) 2000 UNIT Cap Take 1 Cap by mouth every day.     • Cyanocobalamin (VITAMIN B-12) 1000 MCG Tab Take 1,000 mcg by mouth every day.     • ASPIRIN 81 PO Take 81 mg by mouth every day.     • Multiple Vitamins-Minerals (MULTIVITAMIN ADULT PO) Take 1 Tab by mouth every day. Indications: Centrum     • albuterol 108 (90 Base) MCG/ACT Aero Soln inhalation aerosol INHALE 2 PUFFS BY MOUTH EVERY 6 HOURS AS NEEDED FOR SHORTNESS OF BREATH 1 Inhaler 6     No current [facility]-ordered facility-administered medications on file.       Health Maintenance: Not addressed at this visit due to multiple other problems to discuss    ROS:  Gen: no fevers/chills, no changes in weight  Eyes: no changes in vision  ENT: no sore throat, no hearing loss, no bloody nose  Pulm: no sob, no cough  CV: no chest pain, no palpitations  GI: no nausea/vomiting, no diarrhea  : no dysuria  MSk: no myalgias  Skin: no rash  Neuro: no headaches, no numbness/tingling  Heme/Lymph: no easy bruising      Objective:     Exam:  /68 (BP Location: Right arm, Patient Position: Sitting, BP Cuff Size: Adult)   Pulse 84   Temp 36.3 °C (97.4 °F) (Temporal)   Ht 1.626 m (5' 4\")   Wt 65.9 kg (145 lb 3.2 oz)   LMP  (LMP Unknown)   SpO2 93%   BMI 24.92 kg/m²  Body mass index is 24.92 kg/m².    Gen: Alert and oriented, No apparent distress.  Shuffling gait.  Neck: Neck is supple without lymphadenopathy.  Lungs: Normal effort, CTA bilaterally, no wheezes, rhonchi, or rales  CV: Regular rate and rhythm. No murmurs, rubs, or gallops.  Ext: No clubbing, cyanosis, edema.  Neurological:  Alert and oriented x 3. DTRs 2+ and symmetric. No cranial nerve deficit. Strength and sensation intact.       Assessment & Plan:     89 y.o. female with the following -     Problem List Items Addressed This Visit     Balance problem     -Referral to home health for evaluation  -Patient does require some PT for strengthening  -Referral to cardiology " related to near syncopal episodes and some concern for arrhythmia         Closed fracture of left upper extremity with routine healing     -Referral to PT for balance and gait issues  -Continue follow-up with orthopedics.         Essential hypertension     -Decrease metoprolol to 12.5 mg p.o. daily  -Send blood pressures and pulses via MyChart, will discuss the potential of discontinuing the medication  -Discussed potential cardiology referral related to symptoms potentially consistent with syncope or near syncopal episodes/possible arrhythmia patient has been resistant to cardiology referral and work-up in the past.         Fall    Near syncope     -Referral to cardiology.         Urinary urgency     -Urinalysis is within normal limits no evidence of infection  -We discussed possible medication contributors and discussed that she is not on anything specific that would likely be causing her symptoms  -We discussed possible urology referral and follow-up which patient declines at this time  -She is requesting medication, provider will research options and see if there is anything that she would be able to take safely         Relevant Orders    POCT Urinalysis (Completed)          I spent a total of 40 minutes with record review, exam, communication with the patient, communication with other providers, and documentation of this encounter.      No follow-ups on file.    Please note that this dictation was created using voice recognition software. I have made every reasonable attempt to correct obvious errors, but I expect that there are errors of grammar and possibly content that I did not discover before finalizing the note.        Face to Face Supporting Documentation - Home Health    The encounter with this patient was in whole or in part the primary reason for home health admission.    Date of encounter:   Patient:                    MRN:                       YOB: 2021  Thalia Weems  [facility] [place]  [id number]  2/8/1931     Home health to see patient for:  Skilled Nursing care for assessment, interventions & education, Physical Therapy evaluation and treatment and Occupational therapy evaluation and treatment    Skilled need for:  New Onset Medical Diagnosis Recent fracture related to falls and Recent Deterioration of Health Status Multiple recent falls, difficulty with activities of daily living, debility    Skilled nursing interventions to include:  Comment: Evaluation for home health aide    Homebound status evidenced by:  Need the aid of supportive devices such as crutches, canes, wheelchairs or walkers or Needs the assistance of another person in order to leave the home. Leaving home requires a considerable and taxing effort. There is a normal inability to leave the home.    Community Physician to provide follow up care: JAVIER Paiz.     Optional Interventions? No      I certify the face to face encounter for this home health care referral meets the CMS requirements and the encounter/clinical assessment with the patient was, in whole, or in part, for the medical condition(s) listed above, which is the primary reason for home health care. Based on my clinical findings: the service(s) are medically necessary, support the need for home health care, and the homebound criteria are met.  I certify that this patient has had a face to face encounter by myself.  JAVIER Paiz. - NPI: [id number]

## 2021-11-15 ENCOUNTER — PATIENT MESSAGE (OUTPATIENT)
Dept: MEDICAL GROUP | Facility: PHYSICIAN GROUP | Age: 86
End: 2021-11-15

## 2021-11-19 ENCOUNTER — TELEPHONE (OUTPATIENT)
Dept: MEDICAL GROUP | Facility: PHYSICIAN GROUP | Age: 86
End: 2021-11-19

## 2021-11-19 NOTE — TELEPHONE ENCOUNTER
VOICEMAIL  1. Caller Name: Jluis Nurse from [facility]                      Call Back Number: [phone]    2. Message: Jluis a nurse from [facility] left vm regarding Thalia Wendehost, Jluis stated that Thalia was drinking coffee and then was not able able to breathe for 30-40 seconds, pt was gasping for air. Pt reported she had one other episode, pt's daughter reports more. Jluis stated her orthostatic readings were 92/60 sitting, 80/60 standing pt reported slight dizziness while standing. Pt has had low BP for the past 3 days. Jluis is requesting Marcelo to call [phone] and ask to speak with Linda regarding this pt.     3. Patient approves office to leave a detailed voicemail/MyChart message: N\A

## 2021-11-22 NOTE — TELEPHONE ENCOUNTER
Can you please call home health and follow-up on this/triage her as I was out of the office on Friday and this was not addressed.

## 2021-11-23 ENCOUNTER — APPOINTMENT (OUTPATIENT)
Dept: RADIOLOGY | Facility: MEDICAL CENTER | Age: 86
DRG: 189 | End: 2021-11-23
Attending: STUDENT IN AN ORGANIZED HEALTH CARE EDUCATION/TRAINING PROGRAM
Payer: MEDICARE

## 2021-11-23 ENCOUNTER — APPOINTMENT (OUTPATIENT)
Dept: RADIOLOGY | Facility: MEDICAL CENTER | Age: 86
DRG: 189 | End: 2021-11-23
Attending: EMERGENCY MEDICINE
Payer: MEDICARE

## 2021-11-23 ENCOUNTER — TELEPHONE (OUTPATIENT)
Dept: MEDICAL GROUP | Facility: PHYSICIAN GROUP | Age: 86
End: 2021-11-23

## 2021-11-23 ENCOUNTER — HOSPITAL ENCOUNTER (INPATIENT)
Facility: MEDICAL CENTER | Age: 86
LOS: 4 days | DRG: 189 | End: 2021-11-27
Attending: EMERGENCY MEDICINE | Admitting: STUDENT IN AN ORGANIZED HEALTH CARE EDUCATION/TRAINING PROGRAM
Payer: MEDICARE

## 2021-11-23 DIAGNOSIS — A41.9 SEPSIS, DUE TO UNSPECIFIED ORGANISM, UNSPECIFIED WHETHER ACUTE ORGAN DYSFUNCTION PRESENT (HCC): ICD-10-CM

## 2021-11-23 DIAGNOSIS — J44.1 CHRONIC OBSTRUCTIVE PULMONARY DISEASE WITH ACUTE EXACERBATION (HCC): ICD-10-CM

## 2021-11-23 DIAGNOSIS — J18.9 PNEUMONIA DUE TO INFECTIOUS ORGANISM, UNSPECIFIED LATERALITY, UNSPECIFIED PART OF LUNG: ICD-10-CM

## 2021-11-23 DIAGNOSIS — R53.1 GENERALIZED WEAKNESS: ICD-10-CM

## 2021-11-23 DIAGNOSIS — I27.20 PULMONARY HYPERTENSION (HCC): ICD-10-CM

## 2021-11-23 DIAGNOSIS — J96.01 ACUTE RESPIRATORY FAILURE WITH HYPOXIA (HCC): ICD-10-CM

## 2021-11-23 PROBLEM — F32.A ANXIETY AND DEPRESSION: Status: ACTIVE | Noted: 2021-11-23

## 2021-11-23 PROBLEM — F41.9 ANXIETY AND DEPRESSION: Status: ACTIVE | Noted: 2021-11-23

## 2021-11-23 LAB
ALBUMIN SERPL BCP-MCNC: 4.3 G/DL (ref 3.2–4.9)
ALBUMIN/GLOB SERPL: 1.3 G/DL
ALP SERPL-CCNC: 68 U/L (ref 30–99)
ALT SERPL-CCNC: 11 U/L (ref 2–50)
ANION GAP SERPL CALC-SCNC: 13 MMOL/L (ref 7–16)
AST SERPL-CCNC: 13 U/L (ref 12–45)
BASOPHILS # BLD AUTO: 0.5 % (ref 0–1.8)
BASOPHILS # BLD: 0.05 K/UL (ref 0–0.12)
BILIRUB SERPL-MCNC: 0.8 MG/DL (ref 0.1–1.5)
BUN SERPL-MCNC: 14 MG/DL (ref 8–22)
CALCIUM SERPL-MCNC: 9.6 MG/DL (ref 8.5–10.5)
CHLORIDE SERPL-SCNC: 101 MMOL/L (ref 96–112)
CO2 SERPL-SCNC: 25 MMOL/L (ref 20–33)
CREAT SERPL-MCNC: 0.69 MG/DL (ref 0.5–1.4)
D DIMER PPP IA.FEU-MCNC: 2.39 UG/ML (FEU) (ref 0–0.5)
EKG IMPRESSION: NORMAL
EOSINOPHIL # BLD AUTO: 0.02 K/UL (ref 0–0.51)
EOSINOPHIL NFR BLD: 0.2 % (ref 0–6.9)
ERYTHROCYTE [DISTWIDTH] IN BLOOD BY AUTOMATED COUNT: 46.7 FL (ref 35.9–50)
FLUAV RNA SPEC QL NAA+PROBE: NEGATIVE
FLUBV RNA SPEC QL NAA+PROBE: NEGATIVE
GLOBULIN SER CALC-MCNC: 3.2 G/DL (ref 1.9–3.5)
GLUCOSE SERPL-MCNC: 149 MG/DL (ref 65–99)
HCT VFR BLD AUTO: 42.8 % (ref 37–47)
HGB BLD-MCNC: 13.8 G/DL (ref 12–16)
IMM GRANULOCYTES # BLD AUTO: 0.03 K/UL (ref 0–0.11)
IMM GRANULOCYTES NFR BLD AUTO: 0.3 % (ref 0–0.9)
LACTATE BLD-SCNC: 1.7 MMOL/L (ref 0.5–2)
LYMPHOCYTES # BLD AUTO: 1.55 K/UL (ref 1–4.8)
LYMPHOCYTES NFR BLD: 14.3 % (ref 22–41)
MCH RBC QN AUTO: 31.4 PG (ref 27–33)
MCHC RBC AUTO-ENTMCNC: 32.2 G/DL (ref 33.6–35)
MCV RBC AUTO: 97.3 FL (ref 81.4–97.8)
MONOCYTES # BLD AUTO: 0.96 K/UL (ref 0–0.85)
MONOCYTES NFR BLD AUTO: 8.8 % (ref 0–13.4)
NEUTROPHILS # BLD AUTO: 8.26 K/UL (ref 2–7.15)
NEUTROPHILS NFR BLD: 75.9 % (ref 44–72)
NRBC # BLD AUTO: 0 K/UL
NRBC BLD-RTO: 0 /100 WBC
PLATELET # BLD AUTO: 181 K/UL (ref 164–446)
PMV BLD AUTO: 9.2 FL (ref 9–12.9)
POTASSIUM SERPL-SCNC: 3.6 MMOL/L (ref 3.6–5.5)
PROT SERPL-MCNC: 7.5 G/DL (ref 6–8.2)
RBC # BLD AUTO: 4.4 M/UL (ref 4.2–5.4)
RSV RNA SPEC QL NAA+PROBE: NEGATIVE
SARS-COV-2 RNA RESP QL NAA+PROBE: NOTDETECTED
SODIUM SERPL-SCNC: 139 MMOL/L (ref 135–145)
SPECIMEN SOURCE: NORMAL
WBC # BLD AUTO: 10.9 K/UL (ref 4.8–10.8)

## 2021-11-23 PROCEDURE — 85379 FIBRIN DEGRADATION QUANT: CPT

## 2021-11-23 PROCEDURE — 96375 TX/PRO/DX INJ NEW DRUG ADDON: CPT

## 2021-11-23 PROCEDURE — 80053 COMPREHEN METABOLIC PANEL: CPT

## 2021-11-23 PROCEDURE — 36415 COLL VENOUS BLD VENIPUNCTURE: CPT

## 2021-11-23 PROCEDURE — C9803 HOPD COVID-19 SPEC COLLECT: HCPCS | Performed by: EMERGENCY MEDICINE

## 2021-11-23 PROCEDURE — 96365 THER/PROPH/DIAG IV INF INIT: CPT

## 2021-11-23 PROCEDURE — 770004 HCHG ROOM/CARE - ONCOLOGY PRIVATE *

## 2021-11-23 PROCEDURE — 99285 EMERGENCY DEPT VISIT HI MDM: CPT

## 2021-11-23 PROCEDURE — 71045 X-RAY EXAM CHEST 1 VIEW: CPT

## 2021-11-23 PROCEDURE — 71275 CT ANGIOGRAPHY CHEST: CPT | Mod: MF

## 2021-11-23 PROCEDURE — 83605 ASSAY OF LACTIC ACID: CPT

## 2021-11-23 PROCEDURE — 700111 HCHG RX REV CODE 636 W/ 250 OVERRIDE (IP): Performed by: EMERGENCY MEDICINE

## 2021-11-23 PROCEDURE — 93005 ELECTROCARDIOGRAM TRACING: CPT | Performed by: EMERGENCY MEDICINE

## 2021-11-23 PROCEDURE — 99223 1ST HOSP IP/OBS HIGH 75: CPT | Mod: AI | Performed by: STUDENT IN AN ORGANIZED HEALTH CARE EDUCATION/TRAINING PROGRAM

## 2021-11-23 PROCEDURE — 0241U HCHG SARS-COV-2 COVID-19 NFCT DS RESP RNA 4 TRGT MIC: CPT

## 2021-11-23 PROCEDURE — 87040 BLOOD CULTURE FOR BACTERIA: CPT

## 2021-11-23 PROCEDURE — 700105 HCHG RX REV CODE 258: Performed by: STUDENT IN AN ORGANIZED HEALTH CARE EDUCATION/TRAINING PROGRAM

## 2021-11-23 PROCEDURE — 84145 PROCALCITONIN (PCT): CPT

## 2021-11-23 PROCEDURE — 700117 HCHG RX CONTRAST REV CODE 255: Performed by: STUDENT IN AN ORGANIZED HEALTH CARE EDUCATION/TRAINING PROGRAM

## 2021-11-23 PROCEDURE — 84484 ASSAY OF TROPONIN QUANT: CPT

## 2021-11-23 PROCEDURE — 85025 COMPLETE CBC W/AUTO DIFF WBC: CPT

## 2021-11-23 RX ORDER — POTASSIUM CHLORIDE 20 MEQ/1
40 TABLET, EXTENDED RELEASE ORAL ONCE
Status: COMPLETED | OUTPATIENT
Start: 2021-11-23 | End: 2021-11-24

## 2021-11-23 RX ORDER — AMOXICILLIN 250 MG
2 CAPSULE ORAL 2 TIMES DAILY
Status: DISCONTINUED | OUTPATIENT
Start: 2021-11-23 | End: 2021-11-27 | Stop reason: HOSPADM

## 2021-11-23 RX ORDER — LEVOTHYROXINE SODIUM 0.03 MG/1
25 TABLET ORAL
Status: DISCONTINUED | OUTPATIENT
Start: 2021-11-24 | End: 2021-11-27 | Stop reason: HOSPADM

## 2021-11-23 RX ORDER — SODIUM CHLORIDE 9 MG/ML
1000 INJECTION, SOLUTION INTRAVENOUS ONCE
Status: COMPLETED | OUTPATIENT
Start: 2021-11-23 | End: 2021-11-24

## 2021-11-23 RX ORDER — ACETAMINOPHEN 325 MG/1
650 TABLET ORAL EVERY 6 HOURS PRN
Status: DISCONTINUED | OUTPATIENT
Start: 2021-11-23 | End: 2021-11-27 | Stop reason: HOSPADM

## 2021-11-23 RX ORDER — OMEPRAZOLE 20 MG/1
20 CAPSULE, DELAYED RELEASE ORAL DAILY
Status: DISCONTINUED | OUTPATIENT
Start: 2021-11-24 | End: 2021-11-27 | Stop reason: HOSPADM

## 2021-11-23 RX ORDER — IPRATROPIUM BROMIDE AND ALBUTEROL SULFATE 2.5; .5 MG/3ML; MG/3ML
3 SOLUTION RESPIRATORY (INHALATION)
Status: DISCONTINUED | OUTPATIENT
Start: 2021-11-23 | End: 2021-11-24

## 2021-11-23 RX ORDER — BISACODYL 10 MG
10 SUPPOSITORY, RECTAL RECTAL
Status: DISCONTINUED | OUTPATIENT
Start: 2021-11-23 | End: 2021-11-27 | Stop reason: HOSPADM

## 2021-11-23 RX ORDER — FLUTICASONE PROPIONATE 50 MCG
1 SPRAY, SUSPENSION (ML) NASAL DAILY
Status: DISCONTINUED | OUTPATIENT
Start: 2021-11-24 | End: 2021-11-27 | Stop reason: HOSPADM

## 2021-11-23 RX ORDER — METOPROLOL SUCCINATE 25 MG/1
25 TABLET, EXTENDED RELEASE ORAL DAILY
Status: DISCONTINUED | OUTPATIENT
Start: 2021-11-24 | End: 2021-11-27 | Stop reason: HOSPADM

## 2021-11-23 RX ORDER — CEFTRIAXONE 1 G/1
1 INJECTION, POWDER, FOR SOLUTION INTRAMUSCULAR; INTRAVENOUS ONCE
Status: COMPLETED | OUTPATIENT
Start: 2021-11-23 | End: 2021-11-23

## 2021-11-23 RX ORDER — AZITHROMYCIN 500 MG/1
500 INJECTION, POWDER, LYOPHILIZED, FOR SOLUTION INTRAVENOUS ONCE
Status: COMPLETED | OUTPATIENT
Start: 2021-11-23 | End: 2021-11-23

## 2021-11-23 RX ORDER — EZETIMIBE 10 MG/1
10 TABLET ORAL DAILY
Status: DISCONTINUED | OUTPATIENT
Start: 2021-11-24 | End: 2021-11-27 | Stop reason: HOSPADM

## 2021-11-23 RX ORDER — ESCITALOPRAM OXALATE 10 MG/1
10 TABLET ORAL DAILY
Status: DISCONTINUED | OUTPATIENT
Start: 2021-11-24 | End: 2021-11-27 | Stop reason: HOSPADM

## 2021-11-23 RX ORDER — AZITHROMYCIN 250 MG/1
500 TABLET, FILM COATED ORAL DAILY
Status: COMPLETED | OUTPATIENT
Start: 2021-11-24 | End: 2021-11-25

## 2021-11-23 RX ORDER — ENALAPRILAT 1.25 MG/ML
1.25 INJECTION INTRAVENOUS EVERY 6 HOURS PRN
Status: DISCONTINUED | OUTPATIENT
Start: 2021-11-23 | End: 2021-11-27 | Stop reason: HOSPADM

## 2021-11-23 RX ORDER — ATORVASTATIN CALCIUM 20 MG/1
20 TABLET, FILM COATED ORAL DAILY
Status: DISCONTINUED | OUTPATIENT
Start: 2021-11-24 | End: 2021-11-27 | Stop reason: HOSPADM

## 2021-11-23 RX ORDER — POLYETHYLENE GLYCOL 3350 17 G/17G
1 POWDER, FOR SOLUTION ORAL
Status: DISCONTINUED | OUTPATIENT
Start: 2021-11-23 | End: 2021-11-27 | Stop reason: HOSPADM

## 2021-11-23 RX ORDER — LABETALOL HYDROCHLORIDE 5 MG/ML
10 INJECTION, SOLUTION INTRAVENOUS EVERY 4 HOURS PRN
Status: DISCONTINUED | OUTPATIENT
Start: 2021-11-23 | End: 2021-11-27 | Stop reason: HOSPADM

## 2021-11-23 RX ADMIN — IOHEXOL 45 ML: 350 INJECTION, SOLUTION INTRAVENOUS at 23:25

## 2021-11-23 RX ADMIN — AZITHROMYCIN MONOHYDRATE 500 MG: 500 INJECTION, POWDER, LYOPHILIZED, FOR SOLUTION INTRAVENOUS at 22:57

## 2021-11-23 RX ADMIN — CEFTRIAXONE SODIUM 1 G: 1 INJECTION, POWDER, FOR SOLUTION INTRAMUSCULAR; INTRAVENOUS at 22:57

## 2021-11-23 RX ADMIN — SODIUM CHLORIDE 1000 ML: 9 INJECTION, SOLUTION INTRAVENOUS at 23:41

## 2021-11-23 ASSESSMENT — ENCOUNTER SYMPTOMS
FALLS: 1
ABDOMINAL PAIN: 0
HEADACHES: 0
VOMITING: 0
NAUSEA: 0
SPUTUM PRODUCTION: 1
DIARRHEA: 0
DIZZINESS: 0
FEVER: 0
SHORTNESS OF BREATH: 1
COUGH: 1
SORE THROAT: 0
CHILLS: 0

## 2021-11-23 ASSESSMENT — FIBROSIS 4 INDEX: FIB4 SCORE: 2.35

## 2021-11-23 NOTE — TELEPHONE ENCOUNTER
Spoke with son Freddie, last week between 8 to 9 a.m. while his mother was drinking coffee it was almost like coffee went down the wrong way but son does not think that was the case (incident was reported to him by his wife who was with patient).  Patient had her esophagus scoped about 1 1/2 months ago and testing was fairly normal.      Currently the concern is her blood pressure which drops when she stands up.  All members of her home health care team are finding that her BP drops when she stands up.  The patient does not report dizziness when she stands up; however, she regularly seems shaky and is slow & deliberate when she walks with cane & assistance from family.  BP med was reduced 50% by PCP @ last visit.      Patient used walker before injury to arm, unable to use walker now.      Patient takes her meds @ 0900.  This morning before taking her meds her BP was around 157/80 (sitting) and 133/70 (standing).  OT reported her BP was 103/50 (sitting) after meds yesterday morning.      Family also want to follow-up w/PCP about bladder control issue.      Follow-up appointment with PCP has been moved from 12/2 to 11/29.      Family agreed to call 911 if patient has another episode of choking and will not allow patient to ambulate unassisted.

## 2021-11-23 NOTE — TELEPHONE ENCOUNTER
Phone Number Called: [phone]    Call outcome: Left detailed message for patient. Informed to call back with any additional questions.    Message: Please call Nurse Bobbi at [phone] to discuss home BP readings that were recently sent to the office via a [employer] message.

## 2021-11-24 PROBLEM — J44.1 CHRONIC OBSTRUCTIVE PULMONARY DISEASE WITH ACUTE EXACERBATION (HCC): Status: ACTIVE | Noted: 2018-07-19

## 2021-11-24 PROBLEM — R53.1 GENERALIZED WEAKNESS: Status: ACTIVE | Noted: 2021-11-24

## 2021-11-24 LAB
ANION GAP SERPL CALC-SCNC: 8 MMOL/L (ref 7–16)
APPEARANCE UR: CLEAR
BACTERIA #/AREA URNS HPF: NEGATIVE /HPF
BASOPHILS # BLD AUTO: 0.7 % (ref 0–1.8)
BASOPHILS # BLD: 0.07 K/UL (ref 0–0.12)
BILIRUB UR QL STRIP.AUTO: NEGATIVE
BUN SERPL-MCNC: 11 MG/DL (ref 8–22)
CALCIUM SERPL-MCNC: 8.9 MG/DL (ref 8.5–10.5)
CHLORIDE SERPL-SCNC: 103 MMOL/L (ref 96–112)
CO2 SERPL-SCNC: 26 MMOL/L (ref 20–33)
COLOR UR: YELLOW
CREAT SERPL-MCNC: 0.45 MG/DL (ref 0.5–1.4)
EOSINOPHIL # BLD AUTO: 0.09 K/UL (ref 0–0.51)
EOSINOPHIL NFR BLD: 0.9 % (ref 0–6.9)
EPI CELLS #/AREA URNS HPF: ABNORMAL /HPF
ERYTHROCYTE [DISTWIDTH] IN BLOOD BY AUTOMATED COUNT: 46.1 FL (ref 35.9–50)
GLUCOSE SERPL-MCNC: 147 MG/DL (ref 65–99)
GLUCOSE UR STRIP.AUTO-MCNC: NEGATIVE MG/DL
HCT VFR BLD AUTO: 40.9 % (ref 37–47)
HGB BLD-MCNC: 13.5 G/DL (ref 12–16)
HYALINE CASTS #/AREA URNS LPF: ABNORMAL /LPF
IMM GRANULOCYTES # BLD AUTO: 0.04 K/UL (ref 0–0.11)
IMM GRANULOCYTES NFR BLD AUTO: 0.4 % (ref 0–0.9)
KETONES UR STRIP.AUTO-MCNC: ABNORMAL MG/DL
LEUKOCYTE ESTERASE UR QL STRIP.AUTO: NEGATIVE
LYMPHOCYTES # BLD AUTO: 1.41 K/UL (ref 1–4.8)
LYMPHOCYTES NFR BLD: 13.5 % (ref 22–41)
MCH RBC QN AUTO: 31.9 PG (ref 27–33)
MCHC RBC AUTO-ENTMCNC: 33 G/DL (ref 33.6–35)
MCV RBC AUTO: 96.7 FL (ref 81.4–97.8)
MICRO URNS: ABNORMAL
MONOCYTES # BLD AUTO: 0.83 K/UL (ref 0–0.85)
MONOCYTES NFR BLD AUTO: 7.9 % (ref 0–13.4)
NEUTROPHILS # BLD AUTO: 8.02 K/UL (ref 2–7.15)
NEUTROPHILS NFR BLD: 76.6 % (ref 44–72)
NITRITE UR QL STRIP.AUTO: NEGATIVE
NRBC # BLD AUTO: 0 K/UL
NRBC BLD-RTO: 0 /100 WBC
PH UR STRIP.AUTO: 5.5 [PH] (ref 5–8)
PLATELET # BLD AUTO: 165 K/UL (ref 164–446)
PMV BLD AUTO: 9.5 FL (ref 9–12.9)
POTASSIUM SERPL-SCNC: 4.1 MMOL/L (ref 3.6–5.5)
PROCALCITONIN SERPL-MCNC: <0.05 NG/ML
PROCALCITONIN SERPL-MCNC: <0.05 NG/ML
PROT UR QL STRIP: NEGATIVE MG/DL
RBC # BLD AUTO: 4.23 M/UL (ref 4.2–5.4)
RBC # URNS HPF: ABNORMAL /HPF
RBC UR QL AUTO: ABNORMAL
SODIUM SERPL-SCNC: 137 MMOL/L (ref 135–145)
SP GR UR STRIP.AUTO: 1.02
TROPONIN T SERPL-MCNC: 7 NG/L (ref 6–19)
TROPONIN T SERPL-MCNC: <6 NG/L (ref 6–19)
UROBILINOGEN UR STRIP.AUTO-MCNC: 0.2 MG/DL
WBC # BLD AUTO: 10.5 K/UL (ref 4.8–10.8)
WBC #/AREA URNS HPF: ABNORMAL /HPF

## 2021-11-24 PROCEDURE — 700111 HCHG RX REV CODE 636 W/ 250 OVERRIDE (IP): Performed by: NURSE PRACTITIONER

## 2021-11-24 PROCEDURE — 81001 URINALYSIS AUTO W/SCOPE: CPT

## 2021-11-24 PROCEDURE — 99233 SBSQ HOSP IP/OBS HIGH 50: CPT | Performed by: NURSE PRACTITIONER

## 2021-11-24 PROCEDURE — 700101 HCHG RX REV CODE 250: Performed by: STUDENT IN AN ORGANIZED HEALTH CARE EDUCATION/TRAINING PROGRAM

## 2021-11-24 PROCEDURE — 80048 BASIC METABOLIC PNL TOTAL CA: CPT

## 2021-11-24 PROCEDURE — 700111 HCHG RX REV CODE 636 W/ 250 OVERRIDE (IP): Performed by: STUDENT IN AN ORGANIZED HEALTH CARE EDUCATION/TRAINING PROGRAM

## 2021-11-24 PROCEDURE — 770004 HCHG ROOM/CARE - ONCOLOGY PRIVATE *

## 2021-11-24 PROCEDURE — 84484 ASSAY OF TROPONIN QUANT: CPT

## 2021-11-24 PROCEDURE — 87086 URINE CULTURE/COLONY COUNT: CPT

## 2021-11-24 PROCEDURE — 94669 MECHANICAL CHEST WALL OSCILL: CPT

## 2021-11-24 PROCEDURE — 99221 1ST HOSP IP/OBS SF/LOW 40: CPT | Mod: GC | Performed by: INTERNAL MEDICINE

## 2021-11-24 PROCEDURE — A9270 NON-COVERED ITEM OR SERVICE: HCPCS | Performed by: STUDENT IN AN ORGANIZED HEALTH CARE EDUCATION/TRAINING PROGRAM

## 2021-11-24 PROCEDURE — 85025 COMPLETE CBC W/AUTO DIFF WBC: CPT

## 2021-11-24 PROCEDURE — 700102 HCHG RX REV CODE 250 W/ 637 OVERRIDE(OP): Performed by: NURSE PRACTITIONER

## 2021-11-24 PROCEDURE — 97162 PT EVAL MOD COMPLEX 30 MIN: CPT

## 2021-11-24 PROCEDURE — A9270 NON-COVERED ITEM OR SERVICE: HCPCS | Performed by: NURSE PRACTITIONER

## 2021-11-24 PROCEDURE — 84145 PROCALCITONIN (PCT): CPT

## 2021-11-24 PROCEDURE — 700102 HCHG RX REV CODE 250 W/ 637 OVERRIDE(OP): Performed by: STUDENT IN AN ORGANIZED HEALTH CARE EDUCATION/TRAINING PROGRAM

## 2021-11-24 PROCEDURE — 94760 N-INVAS EAR/PLS OXIMETRY 1: CPT

## 2021-11-24 PROCEDURE — 36415 COLL VENOUS BLD VENIPUNCTURE: CPT

## 2021-11-24 PROCEDURE — 97166 OT EVAL MOD COMPLEX 45 MIN: CPT

## 2021-11-24 PROCEDURE — 94640 AIRWAY INHALATION TREATMENT: CPT

## 2021-11-24 RX ORDER — METHYLPREDNISOLONE SODIUM SUCCINATE 40 MG/ML
40 INJECTION, POWDER, LYOPHILIZED, FOR SOLUTION INTRAMUSCULAR; INTRAVENOUS EVERY 12 HOURS
Status: DISCONTINUED | OUTPATIENT
Start: 2021-11-24 | End: 2021-11-25

## 2021-11-24 RX ORDER — BUDESONIDE AND FORMOTEROL FUMARATE DIHYDRATE 160; 4.5 UG/1; UG/1
2 AEROSOL RESPIRATORY (INHALATION)
Status: DISCONTINUED | OUTPATIENT
Start: 2021-11-24 | End: 2021-11-27 | Stop reason: HOSPADM

## 2021-11-24 RX ORDER — PREDNISONE 20 MG/1
40 TABLET ORAL DAILY
Status: DISCONTINUED | OUTPATIENT
Start: 2021-11-24 | End: 2021-11-24

## 2021-11-24 RX ADMIN — AZITHROMYCIN MONOHYDRATE 500 MG: 250 TABLET ORAL at 05:48

## 2021-11-24 RX ADMIN — OMEPRAZOLE 20 MG: 20 CAPSULE, DELAYED RELEASE ORAL at 05:48

## 2021-11-24 RX ADMIN — METHYLPREDNISOLONE SODIUM SUCCINATE 40 MG: 40 INJECTION, POWDER, FOR SOLUTION INTRAMUSCULAR; INTRAVENOUS at 18:27

## 2021-11-24 RX ADMIN — IPRATROPIUM BROMIDE AND ALBUTEROL SULFATE 3 ML: .5; 2.5 SOLUTION RESPIRATORY (INHALATION) at 01:26

## 2021-11-24 RX ADMIN — ASPIRIN 81 MG: 81 TABLET, COATED ORAL at 05:47

## 2021-11-24 RX ADMIN — PREDNISONE 40 MG: 20 TABLET ORAL at 05:47

## 2021-11-24 RX ADMIN — METOPROLOL SUCCINATE 25 MG: 25 TABLET, EXTENDED RELEASE ORAL at 05:48

## 2021-11-24 RX ADMIN — LEVOTHYROXINE SODIUM 25 MCG: 0.03 TABLET ORAL at 05:48

## 2021-11-24 RX ADMIN — SENNOSIDES AND DOCUSATE SODIUM 2 TABLET: 50; 8.6 TABLET ORAL at 00:15

## 2021-11-24 RX ADMIN — POTASSIUM CHLORIDE 40 MEQ: 1500 TABLET, EXTENDED RELEASE ORAL at 00:15

## 2021-11-24 RX ADMIN — IPRATROPIUM BROMIDE AND ALBUTEROL SULFATE 3 ML: .5; 2.5 SOLUTION RESPIRATORY (INHALATION) at 06:27

## 2021-11-24 RX ADMIN — ATORVASTATIN CALCIUM 20 MG: 20 TABLET, FILM COATED ORAL at 05:48

## 2021-11-24 RX ADMIN — ENOXAPARIN SODIUM 40 MG: 40 INJECTION SUBCUTANEOUS at 05:49

## 2021-11-24 RX ADMIN — EZETIMIBE 10 MG: 10 TABLET ORAL at 18:31

## 2021-11-24 RX ADMIN — ESCITALOPRAM OXALATE 10 MG: 10 TABLET ORAL at 05:48

## 2021-11-24 ASSESSMENT — COGNITIVE AND FUNCTIONAL STATUS - GENERAL
DRESSING REGULAR LOWER BODY CLOTHING: A LITTLE
DRESSING REGULAR UPPER BODY CLOTHING: A LITTLE
DAILY ACTIVITIY SCORE: 19
WALKING IN HOSPITAL ROOM: A LITTLE
HELP NEEDED FOR BATHING: A LITTLE
DRESSING REGULAR LOWER BODY CLOTHING: A LITTLE
MOBILITY SCORE: 19
MOBILITY SCORE: 12
STANDING UP FROM CHAIR USING ARMS: A LITTLE
SUGGESTED CMS G CODE MODIFIER MOBILITY: CL
SUGGESTED CMS G CODE MODIFIER MOBILITY: CK
EATING MEALS: A LITTLE
CLIMB 3 TO 5 STEPS WITH RAILING: A LITTLE
DAILY ACTIVITIY SCORE: 18
MOVING FROM LYING ON BACK TO SITTING ON SIDE OF FLAT BED: UNABLE
MOVING TO AND FROM BED TO CHAIR: UNABLE
PERSONAL GROOMING: A LITTLE
CLIMB 3 TO 5 STEPS WITH RAILING: A LITTLE
DRESSING REGULAR UPPER BODY CLOTHING: A LITTLE
PERSONAL GROOMING: A LITTLE
TURNING FROM BACK TO SIDE WHILE IN FLAT BAD: A LITTLE
STANDING UP FROM CHAIR USING ARMS: A LITTLE
WALKING IN HOSPITAL ROOM: A LITTLE
SUGGESTED CMS G CODE MODIFIER DAILY ACTIVITY: CK
TOILETING: A LITTLE
SUGGESTED CMS G CODE MODIFIER DAILY ACTIVITY: CK
MOVING FROM LYING ON BACK TO SITTING ON SIDE OF FLAT BED: A LITTLE
HELP NEEDED FOR BATHING: A LITTLE
TOILETING: A LITTLE
TURNING FROM BACK TO SIDE WHILE IN FLAT BAD: UNABLE

## 2021-11-24 ASSESSMENT — LIFESTYLE VARIABLES
HOW MANY TIMES IN THE PAST YEAR HAVE YOU HAD 5 OR MORE DRINKS IN A DAY: 0
ALCOHOL_USE: NO
TOTAL SCORE: 0
DOES PATIENT WANT TO STOP DRINKING: NO
TOTAL SCORE: 0
EVER FELT BAD OR GUILTY ABOUT YOUR DRINKING: NO
EVER HAD A DRINK FIRST THING IN THE MORNING TO STEADY YOUR NERVES TO GET RID OF A HANGOVER: NO
HAVE YOU EVER FELT YOU SHOULD CUT DOWN ON YOUR DRINKING: NO
ON A TYPICAL DAY WHEN YOU DRINK ALCOHOL HOW MANY DRINKS DO YOU HAVE: 0
HAVE PEOPLE ANNOYED YOU BY CRITICIZING YOUR DRINKING: NO
TOTAL SCORE: 0
CONSUMPTION TOTAL: NEGATIVE
AVERAGE NUMBER OF DAYS PER WEEK YOU HAVE A DRINK CONTAINING ALCOHOL: 0

## 2021-11-24 ASSESSMENT — ENCOUNTER SYMPTOMS
SPUTUM PRODUCTION: 1
MYALGIAS: 0
TREMORS: 0
CHILLS: 0
HEADACHES: 0
SHORTNESS OF BREATH: 1
ORTHOPNEA: 0
PND: 0
DIARRHEA: 0
DIZZINESS: 0
TINGLING: 0
DOUBLE VISION: 0
WHEEZING: 1
BACK PAIN: 0
BLURRED VISION: 0
PALPITATIONS: 0
WEAKNESS: 1
VOMITING: 0
COUGH: 1
NAUSEA: 0
FEVER: 0
ABDOMINAL PAIN: 0
NECK PAIN: 0
CONSTIPATION: 0

## 2021-11-24 ASSESSMENT — COPD QUESTIONNAIRES
HAVE YOU SMOKED AT LEAST 100 CIGARETTES IN YOUR ENTIRE LIFE: YES
DO YOU EVER COUGH UP ANY MUCUS OR PHLEGM?: NO/ONLY WITH OCCASIONAL COLDS OR INFECTIONS
DURING THE PAST 4 WEEKS HOW MUCH DID YOU FEEL SHORT OF BREATH: SOME OF THE TIME
COPD SCREENING SCORE: 6

## 2021-11-24 ASSESSMENT — PAIN DESCRIPTION - PAIN TYPE
TYPE: ACUTE PAIN

## 2021-11-24 ASSESSMENT — FIBROSIS 4 INDEX: FIB4 SCORE: 2.14

## 2021-11-24 ASSESSMENT — PATIENT HEALTH QUESTIONNAIRE - PHQ9
SUM OF ALL RESPONSES TO PHQ9 QUESTIONS 1 AND 2: 0
2. FEELING DOWN, DEPRESSED, IRRITABLE, OR HOPELESS: NOT AT ALL
1. LITTLE INTEREST OR PLEASURE IN DOING THINGS: NOT AT ALL

## 2021-11-24 ASSESSMENT — GAIT ASSESSMENTS: GAIT LEVEL OF ASSIST: UNABLE TO PARTICIPATE

## 2021-11-24 ASSESSMENT — ACTIVITIES OF DAILY LIVING (ADL): TOILETING: INDEPENDENT

## 2021-11-24 NOTE — ASSESSMENT & PLAN NOTE
Saturating in the 80s at rest on room air, 70s with ambulation. Requiring 2 L O2 in the ED at rest  Likely due to COPD exacerbation  Doubt pneumonia, procalcitonin negative, no obvious consolidation seen on CXR  CTA negative for PE  Continue plan as above and wean oxygen as tolerated.  11/26:  Acute worsening of respiratory status overnight.  CXR consistent with pulmonary edema.  Echo findings consistent with pulmonary HTN.  Initiated on lasix.  Continue supplemental oxygen.

## 2021-11-24 NOTE — CARE PLAN
The patient is Watcher - Medium risk of patient condition declining or worsening    Shift Goals  Clinical Goals: Monitor O2, abx, fluids, safety   Patient Goals: Rest    Progress made toward(s) clinical / shift goals:    Patient arrived to unit from ED. Patient is A&Ox4, forgetful. PIV to R AC CDI with positive blood return. PIV to L AC CDI with no blood return. Patient up with one person assist to BSC. Patient has purewick in place. Bed alarm on and sounding. Patient declines pain at this time. Hourly rounding in place. Call light within reach.     Problem: Knowledge Deficit - Standard  Goal: Patient and family/care givers will demonstrate understanding of plan of care, disease process/condition, diagnostic tests and medications  11/24/2021 0106 by Ayde Swartz R.N.  Outcome: Progressing  11/24/2021 0105 by Ayde Swartz R.N.  Outcome: Progressing     Problem: Fall Risk  Goal: Patient will remain free from falls  11/24/2021 0106 by Ayde Swatrz R.N.  Outcome: Progressing  11/24/2021 0105 by Ayde Swartz R.N.  Outcome: Progressing     Problem: Respiratory  Goal: Patient will achieve/maintain optimum respiratory ventilation and gas exchange  Outcome: Progressing   COVID-19 Surge.

## 2021-11-24 NOTE — ED TRIAGE NOTES
Chief Complaint   Patient presents with   • Shortness of Breath   • Sore Throat   • Cough     BIBA from home for above complaints. Pt reports difficulty breathing while lying flat, sore throat and productive cough with green sputum that started this morning. Vaccinated for COVID and flu, not vaccinated for PNA. Hypotensive with REMSA, 80/60s, given 200cc in route and normotensive upon arrival.    History of COPD, tried albuterol at home without improvement.  Pt at 86% on RA, placed on 2L NC

## 2021-11-24 NOTE — ED NOTES
Med rec updated and complete. Allergies reviewed.  Pt denies antibiotic use  In last 30 days.    Last dose of ASA 11/23/21 @ 4038      [place] [facility] [phone]

## 2021-11-24 NOTE — THERAPY
Occupational Therapy   Initial Evaluation     Patient Name: Thalia Horton  Age:  89 y.o., Sex:  female  Medical Record #: [id number]  Today's Date: 11/24/2021     Precautions  Precautions: Fall Risk  Comments: need to confirm weightbearing on LUE (assumed NWB)    Assessment  Patient is 89 y.o. female with PMHx of hypothyroidism, hypertension, GERD, dyslipidemia, orthostatic, COPD, hypotension, recurrent falls with recent L wrist fracture. Pt admitted for SOB; found to have acute respiratory failure. Pt presents to occupational therapy today with impaired balance, decreased L wrist/hand ROM, decreased L wrist/hand strength, and decreased activity tolerance. Pt required Rhonda to get to edge of bed, Rhonda for LB dressing, and supervision for eating. Pt became orthostatic when standing, but was able to transfer from EOB to chair with Rhonda and SPC. Pt lives with her son and daughter in law who are availble to assist at all times at home. Pt recieves assist with IADLs and some ADLs from her son and daughter inlaw at baseline. Pt would benefit from continued acute occupational therapy to address ADL performance, ADL transfers, and functinal mobility.    Plan    Recommend Occupational Therapy 4 times per week until therapy goals are met for the following treatments:  Adaptive Equipment, Cognitive Skill Development, Community Re-integration, Manual Therapy Techniques, Neuro Re-Education / Balance, Orthotics Treatment, Self Care/Activities of Daily Living, Therapeutic Activities and Therapeutic Exercises.    DC Equipment Recommendations: Unable to determine at this time  Discharge Recommendations: Recommend home health for continued occupational therapy services      Objective       11/24/21 0938   Prior Living Situation   Prior Services Intermittent Physical Support for ADL Per Family;Skilled Home Health Services   Housing / Facility 2 Story House   Steps Into Home 2   Steps In Home   (FOS)   Bathroom Set up Shower  Chair;Walk In Shower   Equipment Owned Single Point Cane;Front-Wheel Walker;Wheelchair;Tub / Shower Seat   Lives with - Patient's Self Care Capacity Adult Children  (son and daughter in law)   Comments Pt live with her son and bouchra in law who are able to help at all times. Pt live on the second story but does not need to negotiate steps once in the home   Prior Level of ADL Function   Self Feeding Independent   Grooming / Hygiene Independent   Bathing Requires Assist   Dressing Requires Assist   Toileting Independent   Prior Level of IADL Function   Medication Management Requires Assist   Laundry Requires Assist   Kitchen Mobility Requires Assist   Finances Requires Assist   Home Management Requires Assist   Shopping Requires Assist   Prior Level Of Mobility Supervision With Device in Community;Supervision With Device in Home   Driving / Transportation Relatives / Others Provide Transportation   Occupation (Pre-Hospital Vocational) Retired Due To Age   History of Falls   History of Falls Yes   Date of Last Fall   (5 in the last 3 years)   Precautions   Precautions Fall Risk   Comments need to confirm weightbearing on LUE  (assumed NWB)   Vitals   O2 (LPM) 5   O2 Delivery Device Nasal Cannula   Vitals Comments BP dropped from 105/47 when sitting to 79/52 when standing   Pain 0 - 10 Group   Therapist Pain Assessment During Activity;Post Activity Pain Same as Prior to Activity  (pt reports mild pain in L wrist)   Cognition    Cognition / Consciousness WDL   Level of Consciousness Alert   Comments pleasant and cooperative   Active ROM Upper Body   Active ROM Upper Body  X   Comments L wrist and hand limited by recent wrist fracture, all other joints WNL   Strength Upper Body   Upper Body Strength  X   Comments L wrist and hand limited by recent wrist fracture, RUE WNL   Sensation Upper Body   Upper Extremity Sensation  WDL   Comments Pt reports no loss of sessation   Upper Body Muscle Tone   Upper Body Muscle Tone   WDL   Coordination Upper Body   Coordination X   Comments L hand limited by recent wrist fracture   Balance Assessment   Sitting Balance (Static) Fair   Sitting Balance (Dynamic) Fair   Standing Balance (Static) Poor   Standing Balance (Dynamic) Poor -   Weight Shift Sitting Fair   Weight Shift Standing Poor   Comments w/ SPC   Bed Mobility    Supine to Sit Minimal Assist   Sit to Supine   (up in chair post session)   Scooting Minimal Assist  (seated)   Comments HOB elevated, use of bed rail   ADL Assessment   Eating Supervision  (set up)   Lower Body Dressing Minimal Assist  (pants)   Toileting   (declined need)   How much help from another person does the patient currently need...   Putting on and taking off regular lower body clothing? 3   Bathing (including washing, rinsing, and drying)? 3   Toileting, which includes using a toilet, bedpan, or urinal? 3   Putting on and taking off regular upper body clothing? 3   Taking care of personal grooming such as brushing teeth? 3   Eating meals? 4   6 Clicks Daily Activity Score 19   Functional Mobility   Sit to Stand Minimal Assist   Bed, Chair, Wheelchair Transfer Minimal Assist   Toilet Transfers Unable to Participate   Transfer Method Stand Step   Mobility sit to stand x2, in room w/ SPC, no LOB   Comments pt was orthostatic when standing   Visual Perception   Visual Perception  WDL   Comments able to read therapist name tag   Edema / Skin Assessment   Edema / Skin  X   Comments mild swelling in L hand   Activity Tolerance   Sitting in Chair 5+ min   Sitting Edge of Bed 6 min   Standing 4 min   Comments limited by orthostatic when standing   Patient / Family Goals   Patient / Family Goal #1 To go home   Short Term Goals   Short Term Goal # 1 Pt will complete ADL transfer with supervision by discharge   Short Term Goal # 2 Pt will complete full body dressing with Rhonda by discharge   Short Term Goal # 3 Pt will complete standing grooming/hygiene with rest breaks and  Rhonda by discharge   Education Group   Education Provided Role of Occupational Therapist   Role of Occupational Therapist Patient Response Patient;Acceptance;Explanation;Verbal Demonstration   Problem List   Problem List Decreased Active Daily Living Skills;Decreased Homemaking Skills;Decreased Upper Extremity Strength Left;Decreased Upper Extremity AROM Left;Decreased Upper Extremity PROM Left;Decreased Functional Mobility;Decreased Activity Tolerance;Impaired Postural Control / Balance;Limited Knowledge of Post Op Precautions

## 2021-11-24 NOTE — ED NOTES
Pt transported to the floor via gurney by transport tech. Pt alert and oriented at this time. Pt belongings and paper work sent with patient. Pt transported on 4L oxygen NC.

## 2021-11-24 NOTE — ED NOTES
Assist RN:  Patient medicated per MAR, tolerated well, assist patient with repositioning, no additional needs at this time.

## 2021-11-24 NOTE — FACE TO FACE
Face to Face Supporting Documentation - Home Health    The encounter with this patient was in whole or in part the primary reason for home health admission.    Date of encounter:   Patient:                    MRN:                       YOB: 2021  Thalia Horton  [id number]  2/8/1931     Home health to see patient for:  Skilled Nursing care for assessment, interventions & education, Physical Therapy evaluation and treatment and Occupational therapy evaluation and treatment    Skilled need for:  Recent Deterioration of Health Status Generalized weakness    Skilled nursing interventions to include:  Comment: Assistance with ADLs    Homebound status evidenced by:  Need the aid of supportive devices such as crutches, canes, wheelchairs or walkers. Leaving home requires a considerable and taxing effort. There is a normal inability to leave the home.    Community Physician to provide follow up care: DOE Paiz     Optional Interventions? No      I certify the face to face encounter for this home health care referral meets the CMS requirements and the encounter/clinical assessment with the patient was, in whole, or in part, for the medical condition(s) listed above, which is the primary reason for home health care. Based on my clinical findings: the service(s) are medically necessary, support the need for home health care, and the homebound criteria are met.  I certify that this patient has had a face to face encounter by myself.  JAVIER Burton. - NPI: [id number]

## 2021-11-24 NOTE — THERAPY
Physical Therapy   Initial Evaluation     Patient Name: Thalia Horton  Age:  89 y.o., Sex:  female  Medical Record #: [id number]  Today's Date: 11/24/2021     Precautions: Fall Risk  Comments: orthostatic, need to confirm LUE WB status    Assessment  Patient is a 89 y.o. female admitted with acute respiratory failure with hypoxia, sepsis, and PNA. Hx of HTN, COPD, and orthostatic hypotension with recent falls. Pt recently fractured L wrist on Oct 7th, cast was removed however WB status not provided. PT eval complete. Min A for bed mobility tasks with VCs for sequencing. Min A for transfers with SPC, VCs for sequencing and hand positioning. Ambulation not assessed d/t pt experiencing orthostatic hypotension upon standing (details below).  Pt will benefit from continued acute care therapy to increase functional independence and standing activity tolerance. Pt currently orthostatic as previously stated, however she demonstrates good BLE strength; anticipate pt will be able to progress in hospital as orthostatic hypotension is controlled and will be able to DC with HHPT with supervision from family for all mobility. Pt will have family at home at all times to assist as needed per report. Will follow for acute PT needs.    Plan  Recommend Physical Therapy 4 times per week until therapy goals are met for the following treatments:  Bed Mobility, Gait Training, Neuro Re-Education / Balance, Self Care/Home Evaluation, Stair Training, Therapeutic Activities and Therapeutic Exercises  DC Equipment Recommendations: None (pt has recommended DME already)  Discharge Recommendations: Recommend home health for continued physical therapy services (though is currently orthostatic)          11/24/21 0936   Vitals   Patient BP Position Supine   Blood Pressure  116/46   Pulse Oximetry 90 %   O2 (LPM) 5   O2 Delivery Device Silicone Nasal Cannula   Vitals Comments sitting BP: 105/47, standing BP: 79/52. pt reported feeling mildly  weak and dizzy upon standing   Pain 0 - 10 Group   Therapist Pain Assessment no c/o pain   Prior Living Situation   Prior Services Skilled Home Health Services;Intermittent Physical Support for ADL Per Family   Housing / Facility 2 Story House   Steps Into Home 2   Steps In Home FOS   Bathroom Set up Walk In Shower;Shower Chair   Equipment Owned Front-Wheel Walker;Single Point Cane;Wheelchair;Tub / Shower Seat   Lives with - Patient's Self Care Capacity Adult Children  (son and DIL)   Comments pt lives with son and DIL, one of whom will be home at all times to assist if needed. Pt lives on 2nd floor, however she will have access to bed and bath on 1st floor   Prior Level of Functional Mobility   Bed Mobility Independent   Transfer Status Independent   Ambulation Independent   Distance Ambulation (Feet) household   Assistive Devices Used Single Point Cane   Stairs Independent   Comments pt reports that she has been able to complete functionally mobility on her own, she states her HHPT reported she can walk at home with spv from family.   History of Falls   History of Falls Yes   Date of Last Fall 5 falls in past 3 years   Cognition    Cognition / Consciousness WDL   Level of Consciousness Alert   Comments pleasant and participatory   Active ROM Lower Body    Active ROM Lower Body  WDL   Strength Lower Body   Lower Body Strength  WDL   Balance Assessment   Sitting Balance (Static) Fair +   Sitting Balance (Dynamic) Fair   Standing Balance (Static) Poor   Standing Balance (Dynamic) Poor -   Weight Shift Sitting Fair   Weight Shift Standing Poor   Comments SPC   Gait Analysis   Gait Level Of Assist Unable to Participate   Weight Bearing Status LUE WB not clarified   Comments amb NT d/t orthostatic hypotension   Bed Mobility    Supine to Sit Minimal Assist   Scooting Minimal Assist  (EOB)   Comments HOB elevated, used rails   Functional Mobility   Sit to Stand Minimal Assist   Bed, Chair, Wheelchair Transfer Minimal  Assist   Transfer Method Stand Step   Mobility EOB>chair   Comments SPC. VCs during stand step transfer for hand positioning and sequencing   Short Term Goals    Short Term Goal # 1 pt will move supine<>EOB with HOB and spv in 6 visits to facilitate getting in/out of bed   Short Term Goal # 2 pt will complete all transfers with spc and spv in 6 visits to increase household independence.   Short Term Goal # 3 pt will amb 150' w spc and spv in 6 visits for household mobility.   Short Term Goal # 4 pt will negotiate 2 stairs with spv in 6 visits for home entry.

## 2021-11-24 NOTE — CONSULTS
Pulmonary Consultation    Date of Service: 11/24/2021    Date of Admission:  11/23/2021  8:20 PM    Consulting Provider:  DOE Burton     Chief Complaint:  Shortness of Breath, Sore Throat, and Cough      History of Present Illness/Hospital Course: Thlaia Horton is a 89 y.o. female with a past medical history of hypothyroidism, hypertension, ?COPD presented to the hospital with complaints of shortness of breath and cough.    She woke up yesterday morning with symptoms of sorethroat, productive cough and intermittent shortness of breath. She denies any fever/chills, myalgia/chills and pedal edema. She reports smoking for 25-30 years about 1 pack/day and quit 30 years ago. She has history of nocturnal hypoxia diagnosed via overnight pulse oximetry on 8/2021 and was advised to use oxygen at night which she is not compliant on. She has no documented PFT's on chart and uses only albuterol as needed. D-dimer was elevated at 2.39 and CTA chest done in the hospital was negative for PE and COVID negative. She is requiring about 5L O2 to maintain o2 sats in low 90's. Pulmonology was consulted for possible suspected COPD exacerbation.    Review of Systems   Respiratory: Positive for cough and shortness of breath.    Cardiovascular: Negative for chest pain, orthopnea and PND.   Gastrointestinal: Negative for abdominal pain, nausea and vomiting.   Genitourinary: Negative for frequency and urgency.   Musculoskeletal: Negative for back pain and neck pain.       Home Medications     Reviewed by Ayde Swartz R.N. (Registered Nurse) on 11/24/21 at 0022  Med List Status: Complete   Medication Last Dose Status   albuterol 108 (90 Base) MCG/ACT Aero Soln inhalation aerosol 11/24/2021 Active   aspirin EC (ECOTRIN) 81 MG Tablet Delayed Response 11/23/2021 Active   atorvastatin (LIPITOR) 20 MG Tab 11/23/2021 Active   Cholecalciferol (VITAMIN D3) 2000 UNIT Cap 11/23/2021 Active   Cyanocobalamin (VITAMIN B-12) 1000 MCG  "Tab 2021 Active   escitalopram (LEXAPRO) 10 MG Tab 2021 Active   ezetimibe (ZETIA) 10 MG Tab 2021 Active   fluticasone (FLONASE) 50 MCG/ACT nasal spray 2021 Active   levothyroxine (SYNTHROID) 25 MCG Tab 2021 Active   metoprolol SR (TOPROL XL) 25 MG TABLET SR 24 HR 2021 Active   multivitamin (THERAGRAN) Tab 2021 Active   pantoprazole (PROTONIX) 40 MG Tablet Delayed Response 2021 Active                Social History     Tobacco Use   • Smoking status: Former Smoker     Packs/day: 1.00     Years: 55.00     Pack years: 55.00     Types: Cigarettes     Quit date: 2013     Years since quittin.3   • Smokeless tobacco: Never Used   Vaping Use   • Vaping Use: Never used   Substance Use Topics   • Alcohol use: Yes     Alcohol/week: 6.0 oz     Types: 10 Glasses of wine per week     Comment: 1 -2    • Drug use: No        Past Medical History:   Diagnosis Date   • Chickenpox    • COPD (chronic obstructive pulmonary disease) (HCC)    • GERD (gastroesophageal reflux disease)    • [place] measles    • Heart palpitations    • Hyperlipidemia    • Osteoarthritis    • Thyroid disease        Past Surgical History:   Procedure Laterality Date   • HIP REPLACEMENT, TOTAL Left        Allergies: Penicillins    Family History   Problem Relation Age of Onset   • Stroke Mother 69       Pulmonary-Specific Vital Signs  Vitals  Blood Pressure : 116/46  Temperature: 36.1 °C (97 °F)  Temp src: Temporal  Pulse: 68  Respiration: 16  Pulse Oximetry: 90 %  Height: 162.6 cm (5' 4\")  Weight: 65.4 kg (144 lb 2.9 oz)    Physical Examination  Physical Exam  Constitutional:       Appearance: Normal appearance.   HENT:      Head: Normocephalic and atraumatic.   Cardiovascular:      Rate and Rhythm: Normal rate and regular rhythm.   Pulmonary:      Effort: Pulmonary effort is normal. No respiratory distress.      Breath sounds: No wheezing.      Comments: Crackles heard in B/l lower lobes  Abdominal:      " General: Bowel sounds are normal.      Palpations: Abdomen is soft.   Musculoskeletal:         General: No swelling.      Right lower leg: No edema.      Left lower leg: No edema.   Skin:     General: Skin is warm and dry.   Neurological:      General: No focal deficit present.      Mental Status: She is alert and oriented to person, place, and time.           No intake or output data in the 24 hours ending 11/24/21 1521    Recent Labs     11/23/21 2044 11/24/21  0707   WBC 10.9* 10.5   NEUTSPOLYS 75.90* 76.60*   LYMPHOCYTES 14.30* 13.50*   MONOCYTES 8.80 7.90   EOSINOPHILS 0.20 0.90   BASOPHILS 0.50 0.70   ASTSGOT 13  --    ALTSGPT 11  --    ALKPHOSPHAT 68  --    TBILIRUBIN 0.8  --      Recent Labs     11/23/21 2044 11/24/21  0707   SODIUM 139 137   POTASSIUM 3.6 4.1   CHLORIDE 101 103   CO2 25 26   BUN 14 11   CREATININE 0.69 0.45*   CALCIUM 9.6 8.9     Recent Labs     11/23/21 2044 11/24/21  0707   ALTSGPT 11  --    ASTSGOT 13  --    ALKPHOSPHAT 68  --    TBILIRUBIN 0.8  --    GLUCOSE 149* 147*         CT-CTA CHEST PULMONARY ARTERY W/ RECONS   Final Result      1.  No CT evidence of pulmonary embolism      2.  Aspiration with new mediastinal adenopathy that ist most likely reactive      3.  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  Multichamber cardiac enlargement similar to comparison      DX-CHEST-PORTABLE (1 VIEW)   Final Result      Cardiac silhouette enlargement without edema identified      Subpleural biapical opacity likely indicates pleural parenchymal scarring. This could obscure an underlying nodule.      Suspicious for a 7 mm pulmonary nodule lateral to the right hilus. Alternatively this could be a vessel on end. CT could clarify if clinically indicated          Patient Active Problem List   Diagnosis   • Osteoarthritis of multiple joints   • Acquired hypothyroidism   • Mixed hyperlipidemia   • Atherosclerosis of both carotid arteries   •  Gastroesophageal reflux disease without esophagitis   • Chronic obstructive pulmonary disease with acute exacerbation (HCC)   • History of angina pectoris   • TIA (transient ischemic attack)   • Mild cognitive impairment   • Neck pain   • Essential hypertension   • Chronic pain of both shoulders   • Balance problem   • Excessive daytime sleepiness   • Fall   • NSVT (nonsustained ventricular tachycardia) (HCC)   • Near syncope   • DNR (do not resuscitate)   • Prediabetes   • Nonsustained ventricular tachycardia (HCC)   • Atrial tachycardia (HCC)   • Nocturnal hypoxia   • Pharyngoesophageal dysphagia   • Urinary urgency   • Closed fracture of left upper extremity with routine healing   • Acute respiratory failure with hypoxia (HCC)   • Anxiety and depression   • Generalized weakness       Assessment and Recommendations:  # Acute hypoxic respiratory failure  # Self reported COPD  # Noctural Hypoxia/DOMINGO  - COPD exacerbation cannot be ruled out even though no official PFT's are available for diagnosis. CXR showing hyperextended lung fields and she did have 30 pck yr history smoking  - She reports sensation of food getting stuck in throat, recommend speech eval  - Continue steroids and antibiotics  - Duonebs and Incentive spirometry Q1 hour while awake  - Consider lasix 20mg qday considering crackles heard at bases in b/l lower lobes  - PT/OT      The patient was seen and plan discussed with my attending, Dr. Buenrostro.

## 2021-11-24 NOTE — H&P
Hospital Medicine History & Physical Note    Date of Service  11/23/2021    Primary Care Physician  Marcelo Thornton \[facility] [place] [facility]\""raquel, ASIM.P.RJOSEMANUEL.    Code Status  DNAR/DNI    Chief Complaint  Chief Complaint   Patient presents with   • Shortness of Breath   • Sore Throat   • Cough       History of Presenting Illness  Thalia Horton is a 89 y.o. female who presented 11/23/2021 with SOB. PMH of hypothyroidism, hypertension, GERD, dyslipidemia, orthostatic, COPD, hypotension with recurrent falls.  She woke up this morning with a sore throat, productive cough with green sputum, no hemoptysis, intermittent shortness of breath.  Her son brought her in because he said she is feeling too tired and weak and is having difficulty getting up out of her recliner.  Denies fever/chills, chest pain, lower extremity edema, orthopnea, abdominal pain, bowel or urinary changes.     She is vaccinated for Covid.  Has a history of COPD, only needing as needed albuterol, quit smoking 30 years ago.  Per chart review has nocturnal hypoxia but has not undergone a sleep study and does not use home oxygen.    I discussed the plan of care with patient and bedside RN.    Review of Systems  Review of Systems   Constitutional: Negative for chills and fever.   HENT: Negative for sore throat.    Respiratory: Positive for cough, sputum production and shortness of breath.    Cardiovascular: Negative for chest pain.   Gastrointestinal: Negative for abdominal pain, diarrhea, nausea and vomiting.   Genitourinary: Negative for dysuria and urgency.   Musculoskeletal: Positive for falls and joint pain.   Neurological: Negative for dizziness and headaches.   All other systems reviewed and are negative.      Past Medical History   has a past medical history of Chickenpox, COPD (chronic obstructive pulmonary disease) (HCC), GERD (gastroesophageal reflux disease), Kinyarwanda measles, Heart palpitations, Hyperlipidemia, Osteoarthritis, and Thyroid disease.    Surgical  History   has a past surgical history that includes hip replacement, total (Left).     Family History  family history includes Stroke (age of onset: 69) in her mother.   Family history reviewed with patient. There is no family history that is pertinent to the chief complaint.     Social History   reports that she quit smoking about 8 years ago. Her smoking use included cigarettes. She has a 55.00 pack-year smoking history. She has never used smokeless tobacco. She reports current alcohol use of about 6.0 oz of alcohol per week. She reports that she does not use drugs.    Allergies  Allergies   Allergen Reactions   • Penicillins Vomiting       Medications  Prior to Admission Medications   Prescriptions Last Dose Informant Patient Reported? Taking?   ASPIRIN 81 PO  Patient's Home Pharmacy Yes No   Sig: Take 81 mg by mouth every day.   Cholecalciferol (VITAMIN D3) 2000 UNIT Cap   Yes No   Sig: Take 1 Cap by mouth every day.   Cyanocobalamin (VITAMIN B-12) 1000 MCG Tab   Yes No   Sig: Take 1,000 mcg by mouth every day.   Multiple Vitamins-Minerals (MULTIVITAMIN ADULT PO)  Patient's Home Pharmacy Yes No   Sig: Take 1 Tab by mouth every day. Indications: Centrum   PANTOPRAZOLE SODIUM PO   Yes No   Sig: Take 40 mg by mouth every day.   albuterol 108 (90 Base) MCG/ACT Aero Soln inhalation aerosol  Patient's Home Pharmacy No No   Sig: INHALE 2 PUFFS BY MOUTH EVERY 6 HOURS AS NEEDED FOR SHORTNESS OF BREATH   atorvastatin (LIPITOR) 20 MG Tab   No No   Sig: TAKE 1 TABLET BY MOUTH EVERY DAY FOR CHOLESTEROL   escitalopram (LEXAPRO) 10 MG Tab   No No   Sig: TAKE 1 TABLET BY MOUTH EVERY DAY   ezetimibe (ZETIA) 10 MG Tab   No No   Sig: TAKE 1 TABLET BY MOUTH EVERY DAY FOR CHOLESTEROL/ STROKE PREVENTION   fluticasone (FLONASE) 50 MCG/ACT nasal spray   No No   Sig: ADMINISTER 1 SPRAY INTO AFFECTED NOSTRIL(S) EVERY DAY   levothyroxine (SYNTHROID) 25 MCG Tab   No No   Sig: TAKE 1 TABLET BY MOUTH EVERY MORNING ON AN EMPTY STOMACH FOR  THYROID   metoprolol SR (TOPROL XL) 25 MG TABLET SR 24 HR   No No   Sig: TAKE 1 TABLET BY MOUTH EVERY DAY   pantoprazole (PROTONIX) 40 MG Tablet Delayed Response   Yes No   Sig: Take 40 mg by mouth every day.      Facility-Administered Medications: None       Physical Exam  Temp:  [36.6 °C (97.9 °F)-37.5 °C (99.5 °F)] 37.5 °C (99.5 °F)  Pulse:  [] 104  Resp:  [20-36] 20  BP: (124-159)/(64-88) 159/79  SpO2:  [78 %-94 %] 90 %  Blood Pressure : 142/76   Temperature: 36.6 °C (97.9 °F)   Pulse: (!) 109   Respiration: (!) 36   Pulse Oximetry: 92 %       Physical Exam  Constitutional:       Appearance: Normal appearance.   HENT:      Head: Normocephalic and atraumatic.      Mouth/Throat:      Mouth: Mucous membranes are moist.      Pharynx: No oropharyngeal exudate or posterior oropharyngeal erythema.   Eyes:      General: No scleral icterus.  Cardiovascular:      Rate and Rhythm: Normal rate and regular rhythm.      Pulses: Normal pulses.      Heart sounds: Murmur heard.       Pulmonary:      Effort: Respiratory distress present.      Breath sounds: Normal breath sounds. No wheezing or rales.   Abdominal:      Palpations: Abdomen is soft.      Tenderness: There is no abdominal tenderness.   Musculoskeletal:         General: No swelling or tenderness. Normal range of motion.      Cervical back: Normal range of motion and neck supple.   Skin:     General: Skin is warm and dry.   Neurological:      General: No focal deficit present.      Mental Status: She is alert and oriented to person, place, and time. Mental status is at baseline.   Psychiatric:         Mood and Affect: Mood normal.         Laboratory:  Recent Labs     11/23/21 2044   WBC 10.9*   RBC 4.40   HEMOGLOBIN 13.8   HEMATOCRIT 42.8   MCV 97.3   MCH 31.4   MCHC 32.2*   RDW 46.7   PLATELETCT 181   MPV 9.2     Recent Labs     11/23/21 2044   SODIUM 139   POTASSIUM 3.6   CHLORIDE 101   CO2 25   GLUCOSE 149*   BUN 14   CREATININE 0.69   CALCIUM 9.6     Recent  Labs     11/23/21  2044   ALTSGPT 11   ASTSGOT 13   ALKPHOSPHAT 68   TBILIRUBIN 0.8   GLUCOSE 149*         No results for input(s): NTPROBNP in the last 72 hours.      Recent Labs     11/24/21  0056   TROPONINT 7       Imaging:  CT-CTA CHEST PULMONARY ARTERY W/ RECONS   Final Result      1.  No CT evidence of pulmonary embolism      2.  Aspiration with new mediastinal adenopathy that ist most likely reactive      3.  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  Multichamber cardiac enlargement similar to comparison      DX-CHEST-PORTABLE (1 VIEW)   Final Result      Cardiac silhouette enlargement without edema identified      Subpleural biapical opacity likely indicates pleural parenchymal scarring. This could obscure an underlying nodule.      Suspicious for a 7 mm pulmonary nodule lateral to the right hilus. Alternatively this could be a vessel on end. CT could clarify if clinically indicated          X-Ray:  I have personally reviewed the images and compared with prior images.  EKG:  I have personally reviewed the images and compared with prior images.    Assessment/Plan:  I anticipate this patient will require at least two midnights for appropriate medical management, necessitating inpatient admission.    * Acute respiratory failure with hypoxia (HCC)- (present on admission)  Assessment & Plan  Saturating in the 80s at rest on room air, 70s with ambulation. Requiring 2 L O2 in the ED at rest  Likely due to COPD exacerbation  Doubt pneumonia, procalcitonin negative, no obvious consolidation seen on CXR  CTA pending      Chronic obstructive pulmonary disease with acute exacerbation (HCC)- (present on admission)  Assessment & Plan  Has chronic cough, increased with sputum production. Increased shortness of breath  Procalcitonin negative, no PE or obvious consolidation seen on CT, pending read  Start azithromycin and prednisone    Essential hypertension- (present on  admission)  Assessment & Plan  Continue metoprolol     DNR (do not resuscitate)- (present on admission)  Assessment & Plan  Confirmed DNR/DNA with pt    Acquired hypothyroidism- (present on admission)  Assessment & Plan  Continue levothyroxine     Anxiety and depression- (present on admission)  Assessment & Plan  Continue escitalopram     Gastroesophageal reflux disease without esophagitis- (present on admission)  Assessment & Plan  Continue pantoprazole     Mixed hyperlipidemia- (present on admission)  Assessment & Plan  Continue atorvastatin and ezetimibe       VTE prophylaxis: enoxaparin ppx

## 2021-11-24 NOTE — ED NOTES
Walked pt to bathroom without oxygen and she went down to 78%. Put her back on oxygen and she went back up to 92%.

## 2021-11-24 NOTE — PROGRESS NOTES
Hospital Medicine Daily Progress Note    Date of Service  11/24/2021    Chief Complaint  Thalia Horton is a 89 y.o. female admitted 11/23/2021 with shortness of breath and productive cough    Hospital Course  This is a 89-year-old female with a past medical history of hypothyroidism, hypertension, GERD, dyslipidemia, COPD admitted with complaints of worsening shortness of breath, sore throat, and productive cough.  On admission patient found to have a an acute COPD exacerbation requiring submental oxygen.  She does not use oxygen at home.  Initiated on aggressive RT protocol.    Interval Problem Update  11/24: Continues to require 5 L supplemental oxygen.  She does report overall shortness of breath improved.  Does continue to have productive cough.  Initiate IV steroids.  Continue aggressive RT protocol.  Patient found to be generally weak.  Continue to work with therapy.    I have personally seen and examined the patient at bedside. I discussed the plan of care with patient.    Consultants/Specialty  None    Code Status  DNAR/DNI    Disposition  Patient is not medically cleared.   Anticipate discharge to to home with close outpatient follow-up.  Home health referral placed.  I have placed the appropriate orders for post-discharge needs.    Review of Systems  Review of Systems   Constitutional: Positive for malaise/fatigue. Negative for chills and fever.   HENT: Positive for congestion.    Eyes: Negative for blurred vision and double vision.   Respiratory: Positive for cough, sputum production, shortness of breath and wheezing.    Cardiovascular: Negative for chest pain, palpitations and leg swelling.   Gastrointestinal: Negative for abdominal pain, constipation, diarrhea, nausea and vomiting.   Genitourinary: Negative for dysuria.   Musculoskeletal: Negative for joint pain and myalgias.   Skin: Negative for itching and rash.   Neurological: Positive for weakness. Negative for dizziness, tingling, tremors  and headaches.        Physical Exam  Temp:  [36.1 °C (97 °F)-37.5 °C (99.5 °F)] 36.1 °C (97 °F)  Pulse:  [] 68  Resp:  [16-36] 16  BP: (104-159)/(46-88) 116/46  SpO2:  [78 %-95 %] 90 %    Physical Exam  Vitals and nursing note reviewed.   Constitutional:       General: She is not in acute distress.     Appearance: Normal appearance. She is not ill-appearing.   HENT:      Head: Normocephalic and atraumatic.      Nose: Nose normal. No congestion.      Mouth/Throat:      Mouth: Mucous membranes are dry.      Pharynx: Oropharynx is clear.   Eyes:      General:         Right eye: No discharge.         Left eye: No discharge.      Conjunctiva/sclera: Conjunctivae normal.   Cardiovascular:      Rate and Rhythm: Normal rate and regular rhythm.      Pulses: Normal pulses.      Heart sounds: Normal heart sounds. No murmur heard.      Pulmonary:      Effort: Pulmonary effort is normal. No respiratory distress.      Breath sounds: Wheezing present. No rales.   Abdominal:      General: Bowel sounds are normal. There is no distension.      Palpations: Abdomen is soft.      Tenderness: There is no abdominal tenderness. There is no guarding.   Musculoskeletal:      Cervical back: Normal range of motion and neck supple. No rigidity. No muscular tenderness.      Right lower leg: No edema.      Left lower leg: No edema.      Comments: Generalized weakness   Skin:     General: Skin is warm and dry.      Coloration: Skin is not jaundiced or pale.   Neurological:      General: No focal deficit present.      Mental Status: She is alert and oriented to person, place, and time. Mental status is at baseline.   Psychiatric:         Mood and Affect: Mood normal.         Behavior: Behavior normal.         Thought Content: Thought content normal.         Judgment: Judgment normal.         Fluids  No intake or output data in the 24 hours ending 11/24/21 1457    Laboratory  Recent Labs     11/23/21 2044 11/24/21  0707   WBC 10.9* 10.5   RBC  4.40 4.23   HEMOGLOBIN 13.8 13.5   HEMATOCRIT 42.8 40.9   MCV 97.3 96.7   MCH 31.4 31.9   MCHC 32.2* 33.0*   RDW 46.7 46.1   PLATELETCT 181 165   MPV 9.2 9.5     Recent Labs     11/23/21  2044 11/24/21  0707   SODIUM 139 137   POTASSIUM 3.6 4.1   CHLORIDE 101 103   CO2 25 26   GLUCOSE 149* 147*   BUN 14 11   CREATININE 0.69 0.45*   CALCIUM 9.6 8.9                   Imaging  CT-CTA CHEST PULMONARY ARTERY W/ RECONS   Final Result      1.  No CT evidence of pulmonary embolism      2.  Aspiration with new mediastinal adenopathy that ist most likely reactive      3.  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  Multichamber cardiac enlargement similar to comparison      DX-CHEST-PORTABLE (1 VIEW)   Final Result      Cardiac silhouette enlargement without edema identified      Subpleural biapical opacity likely indicates pleural parenchymal scarring. This could obscure an underlying nodule.      Suspicious for a 7 mm pulmonary nodule lateral to the right hilus. Alternatively this could be a vessel on end. CT could clarify if clinically indicated           Assessment/Plan  * Acute respiratory failure with hypoxia (HCC)- (present on admission)  Assessment & Plan  Saturating in the 80s at rest on room air, 70s with ambulation. Requiring 2 L O2 in the ED at rest  Likely due to COPD exacerbation  Doubt pneumonia, procalcitonin negative, no obvious consolidation seen on CXR  CTA negative for PE  Continue plan as above and wean oxygen as tolerated.      Generalized weakness  Assessment & Plan  PT/OT recommending home health.  Referral placed.    Anxiety and depression- (present on admission)  Assessment & Plan  Continue escitalopram     DNR (do not resuscitate)- (present on admission)  Assessment & Plan  Confirmed DNR/DNA with pt    Essential hypertension- (present on admission)  Assessment & Plan  Continue metoprolol     Chronic obstructive pulmonary disease with acute exacerbation  (HCC)- (present on admission)  Assessment & Plan  Has chronic cough, increased with sputum production. Increased shortness of breath  Procalcitonin negative, no PE or obvious consolidation seen on CT, pending read  Initiate IV Solu-Medrol.  Continue azithromycin.  Initiate Symbicort.  Continue as needed breathing treatments.  Wean oxygen as tolerated.  May need to discharge with supplemental oxygen.    Gastroesophageal reflux disease without esophagitis- (present on admission)  Assessment & Plan  Continue pantoprazole     Mixed hyperlipidemia- (present on admission)  Assessment & Plan  Continue atorvastatin and ezetimibe     Acquired hypothyroidism- (present on admission)  Assessment & Plan  Continue levothyroxine          VTE prophylaxis: SCDs/TEDs and enoxaparin ppx    I have performed a physical exam and reviewed and updated ROS and Plan today (11/24/2021). In review of yesterday's note (11/23/2021), there are no changes except as documented above.

## 2021-11-24 NOTE — ASSESSMENT & PLAN NOTE
Reported frequent episodes of orthostatic hypotension at home.  Blood pressure while in the hospital appears stable.  She is on metoprolol at home secondary to history of arrhythmia which appears to have been in the setting of electrolyte imbalance.  Patient may no longer require metoprolol.  Discontinue this medication may improve episodes of orthostatic hypotension.  Patient has outpatient appointment with cardiology in 1 week.  Recommended to address ongoing need for metoprolol.

## 2021-11-24 NOTE — ED PROVIDER NOTES
"ED Provider Note    CHIEF COMPLAINT  Chief Complaint   Patient presents with   • Shortness of Breath   • Sore Throat   • Cough       HPI  Thalia Horton is a 89 y.o. female who presents for evaluation of shortness of breath and a productive cough that began today.  She also has some generalized weakness associated with this.  She woke up with a sore throat, that is been slowly improving.  The patient is vaccinated against Covid.  She has not had a fever although the past couple days has been feeling chills.  No abdominal pain, no chest pain, no vomiting or diarrhea.  No back pain.  No unilateral leg pain or swelling, no history of DVT or PE, does have a history of COPD.  She has oxygen at home but has not been requiring oxygen \"for a long time.\"  She comes in by ambulance tonight, her son at the bedside reports that she was too weak to get out of the kitchen chair to get her back to her recliner so he called EMS.  The patient is staying with her son as she recovers from a left wrist fracture, she just had her cast removed.    REVIEW OF SYSTEMS  Negative for rash, chest pain, abdominal pain, nausea, vomiting, diarrhea, focal weakness, focal numbness, focal tingling. All other systems are negative.     PAST MEDICAL HISTORY  Past Medical History:   Diagnosis Date   • Chickenpox    • COPD (chronic obstructive pulmonary disease) (HCC)    • GERD (gastroesophageal reflux disease)    • Italian measles    • Heart palpitations    • Hyperlipidemia    • Osteoarthritis    • Thyroid disease        FAMILY HISTORY  Family History   Problem Relation Age of Onset   • Stroke Mother 69       SOCIAL HISTORY  Social History     Tobacco Use   • Smoking status: Former Smoker     Packs/day: 1.00     Years: 55.00     Pack years: 55.00     Types: Cigarettes     Quit date: 2013     Years since quittin.3   • Smokeless tobacco: Never Used   Vaping Use   • Vaping Use: Never used   Substance Use Topics   • Alcohol use: Yes     " "Alcohol/week: 6.0 oz     Types: 10 Glasses of wine per week     Comment: 1 -2    • Drug use: No       SURGICAL HISTORY  Past Surgical History:   Procedure Laterality Date   • HIP REPLACEMENT, TOTAL Left        CURRENT MEDICATIONS  I personally reviewed the medication list in the charting documentation.     ALLERGIES  Allergies   Allergen Reactions   • Penicillins Vomiting       MEDICAL RECORD  I have reviewed patient's medical record and pertinent results are listed above.      PHYSICAL EXAM  VITAL SIGNS: /70   Pulse 96   Temp 36.6 °C (97.9 °F)   Resp (!) 22   Ht 1.626 m (5' 4\")   Wt 67.1 kg (148 lb)   LMP  (LMP Unknown)   SpO2 93%   BMI 25.40 kg/m²    Constitutional: Elderly, frail, increased respiratory effort but no linda distress  HENT: Normocephalic, no obvious evidence of acute trauma.  Eyes: No scleral icterus. Normal conjunctiva   Neck: Comfortable movement without any obvious restriction in the range of motion.  Cardiovascular: Upon ascultation I appreciate a regular heart rhythm and a normal rate.   Thorax & Lungs: Normal nonlabored respirations.  Upon application of the stethoscope for auscultation I find there to be no associated chest wall tenderness.  I appreciate no wheezing, rhonchi or rales. There is normal air movement.    Abdomen: The abdomen is not visibly distended. Upon palpation, I find it to be without tenderness.  No mass appreciated.  Skin: The exposed portions of skin reveal no obvious rash or other abnormalities.  Extremities/Musculoskeletal: No obvious sign of acute trauma. No asymmetric calf tenderness or edema.   Neurologic: Alert & oriented. No focal deficits observed.   Psychiatric: Normal affect appropriate for the clinical situation.    DIAGNOSTIC STUDIES / PROCEDURES    LABS/EKGs  Results for orders placed or performed during the hospital encounter of 11/23/21   Lactic acid (lactate)   Result Value Ref Range    Lactic Acid 1.7 0.5 - 2.0 mmol/L   CBC WITH DIFFERENTIAL "   Result Value Ref Range    WBC 10.9 (H) 4.8 - 10.8 K/uL    RBC 4.40 4.20 - 5.40 M/uL    Hemoglobin 13.8 12.0 - 16.0 g/dL    Hematocrit 42.8 37.0 - 47.0 %    MCV 97.3 81.4 - 97.8 fL    MCH 31.4 27.0 - 33.0 pg    MCHC 32.2 (L) 33.6 - 35.0 g/dL    RDW 46.7 35.9 - 50.0 fL    Platelet Count 181 164 - 446 K/uL    MPV 9.2 9.0 - 12.9 fL    Neutrophils-Polys 75.90 (H) 44.00 - 72.00 %    Lymphocytes 14.30 (L) 22.00 - 41.00 %    Monocytes 8.80 0.00 - 13.40 %    Eosinophils 0.20 0.00 - 6.90 %    Basophils 0.50 0.00 - 1.80 %    Immature Granulocytes 0.30 0.00 - 0.90 %    Nucleated RBC 0.00 /100 WBC    Neutrophils (Absolute) 8.26 (H) 2.00 - 7.15 K/uL    Lymphs (Absolute) 1.55 1.00 - 4.80 K/uL    Monos (Absolute) 0.96 (H) 0.00 - 0.85 K/uL    Eos (Absolute) 0.02 0.00 - 0.51 K/uL    Baso (Absolute) 0.05 0.00 - 0.12 K/uL    Immature Granulocytes (abs) 0.03 0.00 - 0.11 K/uL    NRBC (Absolute) 0.00 K/uL   COMP METABOLIC PANEL   Result Value Ref Range    Sodium 139 135 - 145 mmol/L    Potassium 3.6 3.6 - 5.5 mmol/L    Chloride 101 96 - 112 mmol/L    Co2 25 20 - 33 mmol/L    Anion Gap 13.0 7.0 - 16.0    Glucose 149 (H) 65 - 99 mg/dL    Bun 14 8 - 22 mg/dL    Creatinine 0.69 0.50 - 1.40 mg/dL    Calcium 9.6 8.5 - 10.5 mg/dL    AST(SGOT) 13 12 - 45 U/L    ALT(SGPT) 11 2 - 50 U/L    Alkaline Phosphatase 68 30 - 99 U/L    Total Bilirubin 0.8 0.1 - 1.5 mg/dL    Albumin 4.3 3.2 - 4.9 g/dL    Total Protein 7.5 6.0 - 8.2 g/dL    Globulin 3.2 1.9 - 3.5 g/dL    A-G Ratio 1.3 g/dL   COV-2, FLU A/B, AND RSV BY PCR (2-4 HOURS CEPHEID): Collect NP swab in VTM    Specimen: Respirate   Result Value Ref Range    Influenza virus A RNA Negative Negative    Influenza virus B, PCR Negative Negative    RSV, PCR Negative Negative    SARS-CoV-2 by PCR NotDetected     SARS-CoV-2 Source NP Swab    ESTIMATED GFR   Result Value Ref Range    GFR If African American >60 >60 mL/min/1.73 m 2    GFR If Non African American >60 >60 mL/min/1.73 m 2   EKG   Result Value  Ref Range    Report       [facility] Emergency Dept.    Test Date:  2021  Pt Name:    ANAY HARDIN              Department: ER  MRN:        [id number]                      Room:       RD 11  Gender:     Female                       Technician:  :        [id number]                   Requested By:ER TRIAGE PROTOCOL  Order #:    [id number]                    Reading MD: LEORA TEJADA MD    Measurements  Intervals                                Axis  Rate:       97                           P:          93  OR:         200                          QRS:        -39  QRSD:       106                          T:          98  QT:         376  QTc:        478    Interpretive Statements  12 Lead EKG interpreted by me to show: -- Rate 97 -- Rhythm: Normal sinus  rhythm  -- Axis: Normal -- OR and QRS Intervals: Normal -- T waves: No acute changes  --  ST segments: No acute changes -- Ectopy: None. My impression of this EKG:  Does  not indicate acute ischemia at this time.  Electronically Signed  On 2021 22:01:05 PST by LEORA TEJADA MD          RADIOLOGY  DX-CHEST-PORTABLE (1 VIEW)   Final Result      Cardiac silhouette enlargement without edema identified      Subpleural biapical opacity likely indicates pleural parenchymal scarring. This could obscure an underlying nodule.      Suspicious for a 7 mm pulmonary nodule lateral to the right hilus. Alternatively this could be a vessel on end. CT could clarify if clinically indicated            COURSE & MEDICAL DECISION MAKING  I have reviewed any medical record information, laboratory studies and radiographic results as noted above.  Differential diagnoses includes: COVID-19, pneumonia, pneumothorax, pulmonary embolism, dehydration, electrolyte abnormalities, anemia    Encounter Summary: This is a very pleasant 89 y.o. female who unfortunately required evaluation in the emergency department today with shortness of breath and a productive  cough that began today, she is hypoxic here in the emergency department requiring supplemental oxygen.  Her lungs are clear and an x-ray does not reveal obvious infiltrate.  Recently had a left arm fracture, increased risk for a pulmonary embolism so a D-dimer will be obtained.  She does have history of COPD but no wheezing on exam.  Vaccinated against Covid but will still obtain a Covid swab, will check regular septic blood work as the patient does have evidence of sepsis with a heart rate greater than 90 and a respiratory rate greater than 20, pneumonia is most likely at this point.  In the absence of a PE or Covid she will be treated for pneumonia and given her hypoxia will require hospitalization ------- room air ambulatory saturation 78% ------- Covid is negative, D-dimer is pending at this point.  I have discussed the case with the hospitalist who is admitting the patient with the D-dimer pending, however with a negative Covid I suspect at this point that the patient does not fact have an early pneumonia.    CRITICAL CARE  The very real possibilty of a deterioration of this patient's condition required the highest level of my preparedness for sudden, emergent intervention.  I provided critical care services, which included medication orders, frequent reevaluations of the patient's condition and response to treatment, ordering and reviewing test results, and discussing the case with various consultants.  The critical care time associated with the care of the patient was 39 minutes. Review chart for interventions. This time is exclusive of any other billable procedures.         DISPOSITION: Admit in guarded condition      FINAL IMPRESSION  1. Acute respiratory failure with hypoxia (HCC)    2. Sepsis, due to unspecified organism, unspecified whether acute organ dysfunction present (HCC)    3. Pneumonia due to infectious organism, unspecified laterality, unspecified part of lung           This dictation was  created using voice recognition software. The accuracy of the dictation is limited to the abilities of the software. I expect there may be some errors of grammar and possibly content. The nursing notes were reviewed and certain aspects of this information were incorporated into this note.    Electronically signed by: Chris Broussard M.D., 11/23/2021 9:32 PM

## 2021-11-24 NOTE — ASSESSMENT & PLAN NOTE
Has chronic cough, increased with sputum production. Increased shortness of breath  Procalcitonin negative, no PE or obvious consolidation seen on CT, pending read  Initiate IV Solu-Medrol.  Continue azithromycin.  Initiate Symbicort.  Continue as needed breathing treatments.  Wean oxygen as tolerated.  May need to discharge with supplemental oxygen.  11/25: Oxygen requirements have mildly decreased overnight.  Lung sounds have improved.  Transition back to oral prednisone.  Anticipate patient will need home oxygen at discharge.  She will be continued on maintenance inhaler with Symbicort.  Outpatient pulmonary follow-up.  11/26:  Exacerbation appears much improved.

## 2021-11-24 NOTE — PROGRESS NOTES
Hospital Medicine Daily Progress Note    Date of Service  11/24/2021    Chief Complaint  Thalia Horton is a 89 y.o. female admitted 11/23/2021 with shortness of breath and productive cough    Hospital Course  This is a 89-year-old female with a past medical history of hypothyroidism, hypertension, GERD, dyslipidemia, COPD admitted with complaints of worsening shortness of breath, sore throat, and productive cough.  On admission patient found to have a an acute COPD exacerbation requiring submental oxygen.  She does not use oxygen at home.  Initiated on aggressive RT protocol.    Interval Problem Update  11/24: Continues to require 5 L supplemental oxygen.  She does report overall shortness of breath improved.  Does continue to have productive cough.  Initiate IV steroids.  Continue aggressive RT protocol.  Patient found to be generally weak.  Continue to work with therapy.    I have personally seen and examined the patient at bedside. I discussed the plan of care with patient.    Consultants/Specialty  None    Code Status  DNAR/DNI    Disposition  Patient is not medically cleared.   Anticipate discharge to to home with close outpatient follow-up.  Home health referral placed.  I have placed the appropriate orders for post-discharge needs.    Review of Systems  Review of Systems   Constitutional: Positive for malaise/fatigue. Negative for chills and fever.   HENT: Positive for congestion.    Eyes: Negative for blurred vision and double vision.   Respiratory: Positive for cough, sputum production, shortness of breath and wheezing.    Cardiovascular: Negative for chest pain, palpitations and leg swelling.   Gastrointestinal: Negative for abdominal pain, constipation, diarrhea, nausea and vomiting.   Genitourinary: Negative for dysuria.   Musculoskeletal: Negative for joint pain and myalgias.   Skin: Negative for itching and rash.   Neurological: Positive for weakness. Negative for dizziness, tingling, tremors  and headaches.        Physical Exam  Temp:  [36.1 °C (97 °F)-37.5 °C (99.5 °F)] 36.1 °C (97 °F)  Pulse:  [] 68  Resp:  [16-36] 16  BP: (104-159)/(46-88) 116/46  SpO2:  [78 %-95 %] 90 %    Physical Exam  Vitals and nursing note reviewed.   Constitutional:       General: She is not in acute distress.     Appearance: Normal appearance. She is not ill-appearing.   HENT:      Head: Normocephalic and atraumatic.      Nose: Nose normal. No congestion.      Mouth/Throat:      Mouth: Mucous membranes are dry.      Pharynx: Oropharynx is clear.   Eyes:      General:         Right eye: No discharge.         Left eye: No discharge.      Conjunctiva/sclera: Conjunctivae normal.   Cardiovascular:      Rate and Rhythm: Normal rate and regular rhythm.      Pulses: Normal pulses.      Heart sounds: Normal heart sounds. No murmur heard.      Pulmonary:      Effort: Pulmonary effort is normal. No respiratory distress.      Breath sounds: Wheezing present. No rales.   Abdominal:      General: Bowel sounds are normal. There is no distension.      Palpations: Abdomen is soft.      Tenderness: There is no abdominal tenderness. There is no guarding.   Musculoskeletal:      Cervical back: Normal range of motion and neck supple. No rigidity. No muscular tenderness.      Right lower leg: No edema.      Left lower leg: No edema.      Comments: Generalized weakness   Skin:     General: Skin is warm and dry.      Coloration: Skin is not jaundiced or pale.   Neurological:      General: No focal deficit present.      Mental Status: She is alert and oriented to person, place, and time. Mental status is at baseline.   Psychiatric:         Mood and Affect: Mood normal.         Behavior: Behavior normal.         Thought Content: Thought content normal.         Judgment: Judgment normal.         Fluids  No intake or output data in the 24 hours ending 11/24/21 1500    Laboratory  Recent Labs     11/23/21 2044 11/24/21  0707   WBC 10.9* 10.5   RBC  4.40 4.23   HEMOGLOBIN 13.8 13.5   HEMATOCRIT 42.8 40.9   MCV 97.3 96.7   MCH 31.4 31.9   MCHC 32.2* 33.0*   RDW 46.7 46.1   PLATELETCT 181 165   MPV 9.2 9.5     Recent Labs     11/23/21  2044 11/24/21  0707   SODIUM 139 137   POTASSIUM 3.6 4.1   CHLORIDE 101 103   CO2 25 26   GLUCOSE 149* 147*   BUN 14 11   CREATININE 0.69 0.45*   CALCIUM 9.6 8.9                   Imaging  CT-CTA CHEST PULMONARY ARTERY W/ RECONS   Final Result      1.  No CT evidence of pulmonary embolism      2.  Aspiration with new mediastinal adenopathy that ist most likely reactive      3.  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  Multichamber cardiac enlargement similar to comparison      DX-CHEST-PORTABLE (1 VIEW)   Final Result      Cardiac silhouette enlargement without edema identified      Subpleural biapical opacity likely indicates pleural parenchymal scarring. This could obscure an underlying nodule.      Suspicious for a 7 mm pulmonary nodule lateral to the right hilus. Alternatively this could be a vessel on end. CT could clarify if clinically indicated           Assessment/Plan  * Acute respiratory failure with hypoxia (HCC)- (present on admission)  Assessment & Plan  Saturating in the 80s at rest on room air, 70s with ambulation. Requiring 2 L O2 in the ED at rest  Likely due to COPD exacerbation  Doubt pneumonia, procalcitonin negative, no obvious consolidation seen on CXR  CTA negative for PE  Continue plan as above and wean oxygen as tolerated.      Generalized weakness  Assessment & Plan  PT/OT recommending home health.  Referral placed.    Anxiety and depression- (present on admission)  Assessment & Plan  Continue escitalopram     DNR (do not resuscitate)- (present on admission)  Assessment & Plan  Confirmed DNR/DNA with pt    Essential hypertension- (present on admission)  Assessment & Plan  Continue metoprolol     Chronic obstructive pulmonary disease with acute exacerbation  (HCC)- (present on admission)  Assessment & Plan  Has chronic cough, increased with sputum production. Increased shortness of breath  Procalcitonin negative, no PE or obvious consolidation seen on CT, pending read  Initiate IV Solu-Medrol.  Continue azithromycin.  Initiate Symbicort.  Continue as needed breathing treatments.  Wean oxygen as tolerated.  May need to discharge with supplemental oxygen.    Gastroesophageal reflux disease without esophagitis- (present on admission)  Assessment & Plan  Continue pantoprazole     Mixed hyperlipidemia- (present on admission)  Assessment & Plan  Continue atorvastatin and ezetimibe     Acquired hypothyroidism- (present on admission)  Assessment & Plan  Continue levothyroxine        VTE prophylaxis: SCDs/TEDs and enoxaparin ppx    I have performed a physical exam and reviewed and updated ROS and Plan today (11/24/2021). In review of yesterday's note (11/23/2021), there are no changes except as documented above.

## 2021-11-25 PROBLEM — I71.9 AORTIC ANEURYSM, DESCENDING (HCC): Status: ACTIVE | Noted: 2021-11-25

## 2021-11-25 LAB — CORTIS SERPL-MCNC: 8.7 UG/DL (ref 0–23)

## 2021-11-25 PROCEDURE — 94669 MECHANICAL CHEST WALL OSCILL: CPT

## 2021-11-25 PROCEDURE — A9270 NON-COVERED ITEM OR SERVICE: HCPCS | Performed by: NURSE PRACTITIONER

## 2021-11-25 PROCEDURE — 99232 SBSQ HOSP IP/OBS MODERATE 35: CPT | Performed by: NURSE PRACTITIONER

## 2021-11-25 PROCEDURE — 770004 HCHG ROOM/CARE - ONCOLOGY PRIVATE *

## 2021-11-25 PROCEDURE — 82533 TOTAL CORTISOL: CPT

## 2021-11-25 PROCEDURE — A9270 NON-COVERED ITEM OR SERVICE: HCPCS | Performed by: STUDENT IN AN ORGANIZED HEALTH CARE EDUCATION/TRAINING PROGRAM

## 2021-11-25 PROCEDURE — 700111 HCHG RX REV CODE 636 W/ 250 OVERRIDE (IP): Performed by: STUDENT IN AN ORGANIZED HEALTH CARE EDUCATION/TRAINING PROGRAM

## 2021-11-25 PROCEDURE — 700102 HCHG RX REV CODE 250 W/ 637 OVERRIDE(OP): Performed by: STUDENT IN AN ORGANIZED HEALTH CARE EDUCATION/TRAINING PROGRAM

## 2021-11-25 PROCEDURE — 700102 HCHG RX REV CODE 250 W/ 637 OVERRIDE(OP): Performed by: NURSE PRACTITIONER

## 2021-11-25 PROCEDURE — 700111 HCHG RX REV CODE 636 W/ 250 OVERRIDE (IP): Performed by: NURSE PRACTITIONER

## 2021-11-25 PROCEDURE — 36415 COLL VENOUS BLD VENIPUNCTURE: CPT

## 2021-11-25 RX ORDER — PREDNISONE 20 MG/1
40 TABLET ORAL DAILY
Status: DISCONTINUED | OUTPATIENT
Start: 2021-11-26 | End: 2021-11-26

## 2021-11-25 RX ADMIN — METHYLPREDNISOLONE SODIUM SUCCINATE 40 MG: 40 INJECTION, POWDER, FOR SOLUTION INTRAMUSCULAR; INTRAVENOUS at 05:22

## 2021-11-25 RX ADMIN — AZITHROMYCIN MONOHYDRATE 500 MG: 250 TABLET ORAL at 05:21

## 2021-11-25 RX ADMIN — EZETIMIBE 10 MG: 10 TABLET ORAL at 17:27

## 2021-11-25 RX ADMIN — ASPIRIN 81 MG: 81 TABLET, COATED ORAL at 05:22

## 2021-11-25 RX ADMIN — ENOXAPARIN SODIUM 40 MG: 40 INJECTION SUBCUTANEOUS at 05:20

## 2021-11-25 RX ADMIN — LEVOTHYROXINE SODIUM 25 MCG: 0.03 TABLET ORAL at 05:21

## 2021-11-25 RX ADMIN — OMEPRAZOLE 20 MG: 20 CAPSULE, DELAYED RELEASE ORAL at 05:22

## 2021-11-25 RX ADMIN — METOPROLOL SUCCINATE 25 MG: 25 TABLET, EXTENDED RELEASE ORAL at 05:20

## 2021-11-25 RX ADMIN — ESCITALOPRAM OXALATE 10 MG: 10 TABLET ORAL at 05:21

## 2021-11-25 RX ADMIN — ATORVASTATIN CALCIUM 20 MG: 20 TABLET, FILM COATED ORAL at 05:21

## 2021-11-25 ASSESSMENT — ENCOUNTER SYMPTOMS
MYALGIAS: 0
COUGH: 1
WEAKNESS: 1
FEVER: 0
SPUTUM PRODUCTION: 1
SHORTNESS OF BREATH: 0
CONSTIPATION: 0
DIARRHEA: 0
DOUBLE VISION: 0
VOMITING: 0
HEADACHES: 0
NAUSEA: 0
BLURRED VISION: 0
WHEEZING: 0
PALPITATIONS: 0
DIZZINESS: 0
ABDOMINAL PAIN: 0
CHILLS: 0

## 2021-11-25 ASSESSMENT — PAIN DESCRIPTION - PAIN TYPE
TYPE: ACUTE PAIN

## 2021-11-25 NOTE — PROGRESS NOTES
Assumed care of patient, bedside report received from VISHNU Messer. Updated on POC, call light within reach and fall precautions in place. Bed locked and in lowest position. Patient instructed to call for assistance before getting out of bed. All questions answered, no other needs at this time.

## 2021-11-25 NOTE — DISCHARGE PLANNING
Anticipated Discharge Disposition: Home with HH and home O2     Action: LSW met with pt and pt's son Freddie at bedside to complete assessment and collect choice for home health. Pt was on service with [facility] prior, and had home O2 but wasn't using it. Pt does not remember which O2 company she was using, LSW completed chart review but unable to locate home O2 company. LSW will call around on Friday to locate home O2 company. LSW sent     LSW faxed HH choice for resumption of care: [facility]     Barriers to Discharge: Medical clearance, home O2 set up, accepting home health agencies    Plan: LSW will follow up on home O2 on Friday when home O2 companies are open    Care Transition Team Assessment  Pt's DPOA is son Freddie Méndez #[phone]  Pt reports living at [facility]. Son states that pt has had many falls and they are considering removing her from their care and live with son. Pt was not independent with daily living, requires assistance with medication management, driving, shopping, finances. Pt has had HH but never been to SNF. Pt is current with PCP. Pt earns SSI $1439, Pension of $1275, and another daily living savings of $1500 a month's worth of expenses.     Information Source  Orientation Level: Oriented X4  Information Given By: Patient  Informant's Name: Son assisted in information at bedside   Who is responsible for making decisions for patient? : Patient    Readmission Evaluation  Is this a readmission?: No    Elopement Risk  Legal Hold: No  Ambulatory or Self Mobile in Wheelchair: Yes  Disoriented: No  Psychiatric Symptoms: None  History of Wandering: No  Elopement this Admit: No  Vocalizing Wanting to Leave: No  Displays Behaviors, Body Language Wanting to Leave: No-Not at Risk for Elopement    Interdisciplinary Discharge Planning  Primary Care Physician: Dr Marcelo Koch   Lives with - Patient's Self Care Capacity: Adult Children  Patient or legal guardian  wants to designate a caregiver: No  Support Systems: Children  Housing / Facility: [address]  Prior Services: Intermittent Physical Support for ADL Per Family,Skilled Home Health Services  Patient Prefers to be Discharged to:: Home with family  Durable Medical Equipment: Home Oxygen  DME Provider / Phone: Unsure of company LSW will call around to find O2 company    Discharge Preparedness  What is your plan after discharge?: Home health care  What are your discharge supports?: Child  Prior Functional Level: Independent with Activities of Daily Living  Difficulity with ADLs: None    Functional Assesment  Prior Functional Level: Independent with Activities of Daily Living    Finances  Financial Barriers to Discharge: No  Prescription Coverage: Yes    Vision / Hearing Impairment  Vision Impairment : Yes  Right Eye Vision: Wears Glasses  Left Eye Vision: Wears Glasses  Hearing Impairment : No         Advance Directive  [place]vance Directive?: DPOA for Health Care  Durable Power of  Name and Contact : Son: Freddie listed on facesheet    Domestic Abuse  Have you ever been the victim of abuse or violence?: No  Physical Abuse or Sexual Abuse: No  Verbal Abuse or Emotional Abuse: No  Possible Abuse/Neglect Reported to:: Not Applicable    Psychological Assessment  History of Substance Abuse: None  History of Psychiatric Problems: No  Non-compliant with Treatment: No  Newly Diagnosed Illness: No         Anticipated Discharge Information  Discharge Disposition: D/T to home under HHA care in anticipation of covered skilled care (06)  Discharge Address: [address] [place], [place] [address] [id number]  Discharge Contact Phone Number: [phone]

## 2021-11-25 NOTE — RESPIRATORY CARE
COPD EDUCATION by COPD CLINICAL EDUCATOR  11/25/2021  at  10:40 AM by Noy Delaney, DEB     Patient interviewed by COPD education team.  Patient unable to participate in full program.   A comprehensive packet including information about COPD, types of treatments to manage their disease and safe home Oxygen usage was provided and reviewed with patient at the bedside. She is a bit confused and needing redirection with our conversation. Unable to determine understanding. Was able to determine determine that lives independently at [facility]. She is suppose to be on Oxygen at night for Hypoxia. She denies this (EMR with [facility] visits). She denies respiratory medications but has a rescue inhaler on her active medication list. Left information at bedside for COPD and [facility] for family to review. Pulmonary Team saw patient at this admission.  Bedside conversation discussed with SANJEEV Rodriguez and ABDULLAHI Perez    COPD Screen  COPD Risk Screening  Do you have a history of COPD?: Yes  Do you have a Pulmonologist?: No  COPD Population Screener  During the past 4 weeks, how much did you feel short of breath?: Some of the time  Do you ever cough up any mucus or phlegm?: No/only with occasional colds or infections  In the past 12 months, you do less than you used to because of your breathing problems: Agree  Have you smoked at least 100 cigarettes in your entire life?: Yes  How old are you?: 60+  COPD Screening Score: 6  COPD Coordinator Not Recommended: Yes    COPD Assessment  COPD Clinical Specialists ONLY  COPD Education Initiated: Yes--Short Intervention (Pleasant and confused.Unclear answers for Oxygen use at home or medications Pulmonary team saw patient)  DME Company: [employer]  DME Equipment Type: Oxygen 2 LPM at noc  Physician Follow Up Appointment: 11/29/21  Physician Name: Antonio Riddle  Pulmonary Follow Up Appointment: 12/27/21  Appt Time: 0820  Pulmonologist Name:  "[facility]  Referrals Initiated: Yes  Pulmonary Rehab: Yes (sent by care team)  Smoking Cessation: N/A  Palliative Care: Yes  Hospice: N/A  Home Health Care: Yes (On service with [facility]  prior to admit; Resides at [facility])  [facility] Outreach: N/A  [facility]: N/A  [facility]: Declined (left information at bedside for family)  Private In-Home Care Agency: N/A  Is this a COPD exacerbation patient?: Yes  $ Demo/Eval of SVN's, MDI's and Aerosols:  (unable to review due to confusion)    Meds to Beds  Would the patient like to opt in for Bedside Medication Delivery at Discharge?: Yes, interested     MY COPD ACTION PLAN     It is recommended that patients and physicians /healthcare providers complete this action plan together. This plan should be discussed at each physician visit and updated as needed.    The green, yellow and red zones show groups of symptoms of COPD. This list of symptoms is not comprehensive, and you may experience other symptoms. In the \"Actions\" column, your healthcare provider has recommended actions for you to take based on your symptoms.    Patient Name: Thalia Horton   YOB: 1931   Last Updated on:     Green Zone:  I am doing well today Actions   •  Usual activitiy and exercise level •  Take daily medications   •  Usual amounts of cough and phlegm/mucus •  Use oxygen as prescribed   •  Sleep well at night •  Continue regular exercise/diet plan   •  Appetite is good •  At all times avoid cigarette smoke, inhaled irritants     Daily Medications (these medications are taken every day):                Yellow Zone:  I am having a bad day or a COPD flare Actions   •  More breathless than usual •  Continue daily medications   •  I have less energy for my daily activities •  Use quick relief inhaler as ordered   •  Increased or thicker phlegm/mucus •  Use oxygen as prescribed   •  Using quick relief inhaler/nebulizer more " "often •  Get plenty of rest   •  Swelling of ankles more than usual •  Use pursed lip breathing   •  More coughing than usual •  At all times avoid cigarette smoke, inhaled irritants   •  I feel like I have a \"chest cold\"   •  Poor sleep and my symptoms woke me up   •  My appetite is not good   •  My medicine is not helping    •  Call provider immediately if symptoms don’t improve     Continue daily medications, add rescue medications:   Albuterol 2 Puffs Every 6 hours PRN       Medications to be used during a flare up, (as Discussed with Provider):              Red Zone:  I need urgent medical care Actions   •  Severe shortness of breath even at rest •  Call 911 or seek medical care immediately   •  Not able to do any activity because of breathing    •  Fever or shaking chills    •  Feeling confused or very drowsy     •  Chest pains    •  Coughing up blood              "

## 2021-11-25 NOTE — PROGRESS NOTES
Hospital Medicine Daily Progress Note    Date of Service  11/25/2021    Chief Complaint  Thalia Horton is a 89 y.o. female admitted 11/23/2021 with shortness of breath and productive cough    Hospital Course  This is a 89-year-old female with a past medical history of hypothyroidism, hypertension, GERD, dyslipidemia, COPD admitted with complaints of worsening shortness of breath, sore throat, and productive cough.  On admission patient found to have a an acute COPD exacerbation requiring submental oxygen.  She does not use oxygen at home.  Initiated on aggressive RT protocol.    Interval Problem Update  11/24: Continues to require 5 L supplemental oxygen.  She does report overall shortness of breath improved.  Does continue to have productive cough.  Initiate IV steroids.  Continue aggressive RT protocol.  Patient found to be generally weak.  Continue to work with therapy.  11/25: Respiratory status much improved this morning.  Oxygen has been weaned to 3 L.  Anticipate patient will need home oxygen on discharge.  Long discussion with family concerning other comorbidities.  Family reports intermittent orthostatic hypotension at home.  Her blood pressure has remained stable during this hospital stay.  She currently takes metoprolol which was prescribed in the past for episode of arrhythmia and what appears to be at the setting of electrolyte imbalance.  We discussed the probability that metoprolol may dropping her blood pressure at times.  The patient does have a upcoming appointment with cardiology to discuss ongoing need for metoprolol.  Will monitor blood pressure closely in the hospital.  Family would also like SLP evaluation due to concern of aspiration.  Consult pending.  Patient appears to be at his mental baseline.  She reports breathing easier.  Denies any acute needs at this time.    I have personally seen and examined the patient at bedside. I discussed the plan of care with  patient.    Consultants/Specialty  None    Code Status  DNAR/DNI    Disposition  Patient is not medically cleared.   Anticipate discharge to to home with close outpatient follow-up.  Home health referral placed.  I have placed the appropriate orders for post-discharge needs.    Review of Systems  Review of Systems   Constitutional: Positive for malaise/fatigue. Negative for chills and fever.   HENT: Negative for congestion.    Eyes: Negative for blurred vision and double vision.   Respiratory: Positive for cough and sputum production. Negative for shortness of breath and wheezing.    Cardiovascular: Negative for chest pain and palpitations.   Gastrointestinal: Negative for abdominal pain, constipation, diarrhea, nausea and vomiting.   Genitourinary: Negative for dysuria.   Musculoskeletal: Negative for myalgias.   Skin: Negative for itching and rash.   Neurological: Positive for weakness. Negative for dizziness and headaches.        Physical Exam  Temp:  [35.9 °C (96.6 °F)-36.5 °C (97.7 °F)] 35.9 °C (96.6 °F)  Pulse:  [68-87] 85  Resp:  [16-18] 18  BP: (113-148)/(58-73) 148/73  SpO2:  [90 %-93 %] 92 %    Physical Exam  Vitals and nursing note reviewed.   Constitutional:       General: She is not in acute distress.     Appearance: Normal appearance. She is not ill-appearing.   HENT:      Head: Normocephalic and atraumatic.      Nose: Nose normal. No congestion.      Mouth/Throat:      Mouth: Mucous membranes are dry.      Pharynx: Oropharynx is clear.   Eyes:      General: No scleral icterus.        Right eye: No discharge.         Left eye: No discharge.      Conjunctiva/sclera: Conjunctivae normal.   Cardiovascular:      Rate and Rhythm: Normal rate and regular rhythm.      Pulses: Normal pulses.      Heart sounds: Normal heart sounds. No murmur heard.      Pulmonary:      Effort: Pulmonary effort is normal. No respiratory distress.      Breath sounds: No wheezing.   Abdominal:      General: Bowel sounds are  normal. There is no distension.      Palpations: Abdomen is soft.      Tenderness: There is no abdominal tenderness. There is no guarding.   Musculoskeletal:      Cervical back: Normal range of motion and neck supple. No rigidity or tenderness. No muscular tenderness.      Right lower leg: No edema.      Left lower leg: No edema.      Comments: Generalized weakness   Skin:     General: Skin is warm and dry.      Coloration: Skin is not jaundiced or pale.   Neurological:      General: No focal deficit present.      Mental Status: She is alert and oriented to person, place, and time. Mental status is at baseline.   Psychiatric:         Mood and Affect: Mood normal.         Behavior: Behavior normal.         Thought Content: Thought content normal.         Judgment: Judgment normal.         Fluids    Intake/Output Summary (Last 24 hours) at 11/25/2021 1203  Last data filed at 11/24/2021 1800  Gross per 24 hour   Intake 250 ml   Output --   Net 250 ml       Laboratory  Recent Labs     11/23/21 2044 11/24/21  0707   WBC 10.9* 10.5   RBC 4.40 4.23   HEMOGLOBIN 13.8 13.5   HEMATOCRIT 42.8 40.9   MCV 97.3 96.7   MCH 31.4 31.9   MCHC 32.2* 33.0*   RDW 46.7 46.1   PLATELETCT 181 165   MPV 9.2 9.5     Recent Labs     11/23/21 2044 11/24/21  0707   SODIUM 139 137   POTASSIUM 3.6 4.1   CHLORIDE 101 103   CO2 25 26   GLUCOSE 149* 147*   BUN 14 11   CREATININE 0.69 0.45*   CALCIUM 9.6 8.9                   Imaging  CT-CTA CHEST PULMONARY ARTERY W/ RECONS   Final Result      1.  No CT evidence of pulmonary embolism      2.  Aspiration with new mediastinal adenopathy that ist most likely reactive      3.  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  Multichamber cardiac enlargement similar to comparison      DX-CHEST-PORTABLE (1 VIEW)   Final Result      Cardiac silhouette enlargement without edema identified      Subpleural biapical opacity likely indicates pleural parenchymal scarring.  This could obscure an underlying nodule.      Suspicious for a 7 mm pulmonary nodule lateral to the right hilus. Alternatively this could be a vessel on end. CT could clarify if clinically indicated           Assessment/Plan  * Acute respiratory failure with hypoxia (HCC)- (present on admission)  Assessment & Plan  Saturating in the 80s at rest on room air, 70s with ambulation. Requiring 2 L O2 in the ED at rest  Likely due to COPD exacerbation  Doubt pneumonia, procalcitonin negative, no obvious consolidation seen on CXR  CTA negative for PE  Continue plan as above and wean oxygen as tolerated.      Aortic aneurysm, descending (HCC)  Assessment & Plan  Incidental finding on CTA  3.5 cm   Maintain optimal BP control  Annual interval imaging.     Generalized weakness  Assessment & Plan  PT/OT recommending home health.  Referral placed.    Anxiety and depression- (present on admission)  Assessment & Plan  Continue escitalopram     Closed fracture of left upper extremity with routine healing- (present on admission)  Assessment & Plan  Left wrist  Present on admit  Follows with Dr. Adame outpatient  [facility].     DNR (do not resuscitate)- (present on admission)  Assessment & Plan  Confirmed DNR/DNA with pt    Essential hypertension- (present on admission)  Assessment & Plan  Reported frequent episodes of orthostatic hypotension at home.  Blood pressure while in the hospital appears stable.  She is on metoprolol at home secondary to history of arrhythmia which appears to have been in the setting of electrolyte imbalance.  Patient may no longer require metoprolol.  Discontinue this medication may improve episodes of orthostatic hypotension.  Patient has outpatient appointment with cardiology in 1 week.  Recommended to address ongoing need for metoprolol.    Chronic obstructive pulmonary disease with acute exacerbation (HCC)- (present on admission)  Assessment & Plan  Has chronic cough, increased with sputum production.  Increased shortness of breath  Procalcitonin negative, no PE or obvious consolidation seen on CT, pending read  Initiate IV Solu-Medrol.  Continue azithromycin.  Initiate Symbicort.  Continue as needed breathing treatments.  Wean oxygen as tolerated.  May need to discharge with supplemental oxygen.  11/25: Oxygen requirements have mildly decreased overnight.  Lung sounds have improved.  Transition back to oral prednisone.  Anticipate patient will need home oxygen at discharge.  She will be continued on maintenance inhaler with Symbicort.  Outpatient pulmonary follow-up.    Gastroesophageal reflux disease without esophagitis- (present on admission)  Assessment & Plan  Continue pantoprazole     Mixed hyperlipidemia- (present on admission)  Assessment & Plan  Continue atorvastatin and ezetimibe     Acquired hypothyroidism- (present on admission)  Assessment & Plan  Continue levothyroxine        VTE prophylaxis: SCDs/TEDs and enoxaparin ppx    I have performed a physical exam and reviewed and updated ROS and Plan today (11/25/2021). In review of yesterday's note (11/24/2021), there are no changes except as documented above.

## 2021-11-25 NOTE — CARE PLAN
The patient is Watcher - Medium risk of patient condition declining or worsening    Shift Goals  Clinical Goals: Monitor O2, titrate O2, safety, rest, remain free from falls  Patient Goals: Rest    Progress made toward(s) clinical / shift goals:    Patient is A&Ox4, forgetful at times. Up with one person assist, calling appropriately. Bed alarm on and sounding. PIV CDI with no blood return noted. Updated on plan. Call light and personal belongings in reach. Hourly rounding in place.   Problem: Fall Risk  Goal: Patient will remain free from falls  Outcome: Progressing     Problem: Respiratory  Goal: Patient will achieve/maintain optimum respiratory ventilation and gas exchange  Outcome: Progressing     Problem: Mobility  Goal: Patient's capacity to carry out activities will improve  Outcome: Progressing     COVID-19 Surge.

## 2021-11-25 NOTE — DISCHARGE PLANNING
[facility] Name: [facility]  Referral sent per [facility] form @ [phone] -[phone]  [facility] Name: [facility]   Outcome: Left vmail for intake regarding referral

## 2021-11-26 ENCOUNTER — APPOINTMENT (OUTPATIENT)
Dept: CARDIOLOGY | Facility: MEDICAL CENTER | Age: 86
DRG: 189 | End: 2021-11-26
Attending: NURSE PRACTITIONER
Payer: MEDICARE

## 2021-11-26 ENCOUNTER — APPOINTMENT (OUTPATIENT)
Dept: RADIOLOGY | Facility: MEDICAL CENTER | Age: 86
DRG: 189 | End: 2021-11-26
Attending: NURSE PRACTITIONER
Payer: MEDICARE

## 2021-11-26 PROBLEM — I27.20 PULMONARY HYPERTENSION (HCC): Status: ACTIVE | Noted: 2021-11-26

## 2021-11-26 LAB
ALBUMIN SERPL BCP-MCNC: 3.7 G/DL (ref 3.2–4.9)
ALBUMIN/GLOB SERPL: 1.2 G/DL
ALP SERPL-CCNC: 59 U/L (ref 30–99)
ALT SERPL-CCNC: 15 U/L (ref 2–50)
ANION GAP SERPL CALC-SCNC: 9 MMOL/L (ref 7–16)
AST SERPL-CCNC: 16 U/L (ref 12–45)
BACTERIA UR CULT: NORMAL
BASOPHILS # BLD AUTO: 0.1 % (ref 0–1.8)
BASOPHILS # BLD: 0.01 K/UL (ref 0–0.12)
BILIRUB SERPL-MCNC: 0.6 MG/DL (ref 0.1–1.5)
BUN SERPL-MCNC: 21 MG/DL (ref 8–22)
CALCIUM SERPL-MCNC: 9.6 MG/DL (ref 8.5–10.5)
CHLORIDE SERPL-SCNC: 107 MMOL/L (ref 96–112)
CO2 SERPL-SCNC: 27 MMOL/L (ref 20–33)
CREAT SERPL-MCNC: 0.53 MG/DL (ref 0.5–1.4)
D DIMER PPP IA.FEU-MCNC: 2.75 UG/ML (FEU) (ref 0–0.5)
EOSINOPHIL # BLD AUTO: 0 K/UL (ref 0–0.51)
EOSINOPHIL NFR BLD: 0 % (ref 0–6.9)
ERYTHROCYTE [DISTWIDTH] IN BLOOD BY AUTOMATED COUNT: 46.9 FL (ref 35.9–50)
GLOBULIN SER CALC-MCNC: 3.2 G/DL (ref 1.9–3.5)
GLUCOSE SERPL-MCNC: 129 MG/DL (ref 65–99)
HCT VFR BLD AUTO: 42.2 % (ref 37–47)
HGB BLD-MCNC: 13.6 G/DL (ref 12–16)
IMM GRANULOCYTES # BLD AUTO: 0.04 K/UL (ref 0–0.11)
IMM GRANULOCYTES NFR BLD AUTO: 0.3 % (ref 0–0.9)
LV EJECT FRACT  99904: 60
LYMPHOCYTES # BLD AUTO: 0.74 K/UL (ref 1–4.8)
LYMPHOCYTES NFR BLD: 5.6 % (ref 22–41)
MAGNESIUM SERPL-MCNC: 1.9 MG/DL (ref 1.5–2.5)
MCH RBC QN AUTO: 31.4 PG (ref 27–33)
MCHC RBC AUTO-ENTMCNC: 32.2 G/DL (ref 33.6–35)
MCV RBC AUTO: 97.5 FL (ref 81.4–97.8)
MONOCYTES # BLD AUTO: 0.69 K/UL (ref 0–0.85)
MONOCYTES NFR BLD AUTO: 5.2 % (ref 0–13.4)
NEUTROPHILS # BLD AUTO: 11.7 K/UL (ref 2–7.15)
NEUTROPHILS NFR BLD: 88.8 % (ref 44–72)
NRBC # BLD AUTO: 0 K/UL
NRBC BLD-RTO: 0 /100 WBC
NT-PROBNP SERPL IA-MCNC: 3032 PG/ML (ref 0–125)
PHOSPHATE SERPL-MCNC: 2.8 MG/DL (ref 2.5–4.5)
PLATELET # BLD AUTO: 188 K/UL (ref 164–446)
PMV BLD AUTO: 9.4 FL (ref 9–12.9)
POTASSIUM SERPL-SCNC: 3.9 MMOL/L (ref 3.6–5.5)
PROT SERPL-MCNC: 6.9 G/DL (ref 6–8.2)
RBC # BLD AUTO: 4.33 M/UL (ref 4.2–5.4)
SIGNIFICANT IND 70042: NORMAL
SITE SITE: NORMAL
SODIUM SERPL-SCNC: 143 MMOL/L (ref 135–145)
SOURCE SOURCE: NORMAL
WBC # BLD AUTO: 13.2 K/UL (ref 4.8–10.8)

## 2021-11-26 PROCEDURE — 83735 ASSAY OF MAGNESIUM: CPT

## 2021-11-26 PROCEDURE — 700102 HCHG RX REV CODE 250 W/ 637 OVERRIDE(OP): Performed by: STUDENT IN AN ORGANIZED HEALTH CARE EDUCATION/TRAINING PROGRAM

## 2021-11-26 PROCEDURE — 99233 SBSQ HOSP IP/OBS HIGH 50: CPT | Mod: GC | Performed by: INTERNAL MEDICINE

## 2021-11-26 PROCEDURE — 94669 MECHANICAL CHEST WALL OSCILL: CPT

## 2021-11-26 PROCEDURE — 84100 ASSAY OF PHOSPHORUS: CPT

## 2021-11-26 PROCEDURE — A9270 NON-COVERED ITEM OR SERVICE: HCPCS | Performed by: STUDENT IN AN ORGANIZED HEALTH CARE EDUCATION/TRAINING PROGRAM

## 2021-11-26 PROCEDURE — 36415 COLL VENOUS BLD VENIPUNCTURE: CPT

## 2021-11-26 PROCEDURE — 93306 TTE W/DOPPLER COMPLETE: CPT

## 2021-11-26 PROCEDURE — 700111 HCHG RX REV CODE 636 W/ 250 OVERRIDE (IP): Performed by: STUDENT IN AN ORGANIZED HEALTH CARE EDUCATION/TRAINING PROGRAM

## 2021-11-26 PROCEDURE — 85379 FIBRIN DEGRADATION QUANT: CPT

## 2021-11-26 PROCEDURE — 700111 HCHG RX REV CODE 636 W/ 250 OVERRIDE (IP): Performed by: NURSE PRACTITIONER

## 2021-11-26 PROCEDURE — 93306 TTE W/DOPPLER COMPLETE: CPT | Mod: 26 | Performed by: INTERNAL MEDICINE

## 2021-11-26 PROCEDURE — 94640 AIRWAY INHALATION TREATMENT: CPT

## 2021-11-26 PROCEDURE — 85025 COMPLETE CBC W/AUTO DIFF WBC: CPT

## 2021-11-26 PROCEDURE — 770004 HCHG ROOM/CARE - ONCOLOGY PRIVATE *

## 2021-11-26 PROCEDURE — 99233 SBSQ HOSP IP/OBS HIGH 50: CPT | Performed by: NURSE PRACTITIONER

## 2021-11-26 PROCEDURE — 80053 COMPREHEN METABOLIC PANEL: CPT

## 2021-11-26 PROCEDURE — 71045 X-RAY EXAM CHEST 1 VIEW: CPT

## 2021-11-26 PROCEDURE — 83880 ASSAY OF NATRIURETIC PEPTIDE: CPT

## 2021-11-26 PROCEDURE — 92610 EVALUATE SWALLOWING FUNCTION: CPT

## 2021-11-26 RX ORDER — FUROSEMIDE 10 MG/ML
40 INJECTION INTRAMUSCULAR; INTRAVENOUS ONCE
Status: COMPLETED | OUTPATIENT
Start: 2021-11-26 | End: 2021-11-26

## 2021-11-26 RX ORDER — FUROSEMIDE 10 MG/ML
40 INJECTION INTRAMUSCULAR; INTRAVENOUS
Status: DISCONTINUED | OUTPATIENT
Start: 2021-11-26 | End: 2021-11-26

## 2021-11-26 RX ORDER — PREDNISONE 20 MG/1
40 TABLET ORAL DAILY
Status: DISCONTINUED | OUTPATIENT
Start: 2021-11-27 | End: 2021-11-27 | Stop reason: HOSPADM

## 2021-11-26 RX ORDER — METHYLPREDNISOLONE SODIUM SUCCINATE 125 MG/2ML
62.5 INJECTION, POWDER, LYOPHILIZED, FOR SOLUTION INTRAMUSCULAR; INTRAVENOUS ONCE
Status: DISCONTINUED | OUTPATIENT
Start: 2021-11-26 | End: 2021-11-26

## 2021-11-26 RX ORDER — METHYLPREDNISOLONE SODIUM SUCCINATE 40 MG/ML
40 INJECTION, POWDER, LYOPHILIZED, FOR SOLUTION INTRAMUSCULAR; INTRAVENOUS EVERY 12 HOURS
Status: DISCONTINUED | OUTPATIENT
Start: 2021-11-26 | End: 2021-11-26

## 2021-11-26 RX ORDER — FUROSEMIDE 10 MG/ML
40 INJECTION INTRAMUSCULAR; INTRAVENOUS
Status: DISCONTINUED | OUTPATIENT
Start: 2021-11-26 | End: 2021-11-27 | Stop reason: HOSPADM

## 2021-11-26 RX ADMIN — BUDESONIDE AND FORMOTEROL FUMARATE DIHYDRATE 2 PUFF: 160; 4.5 AEROSOL RESPIRATORY (INHALATION) at 20:00

## 2021-11-26 RX ADMIN — ATORVASTATIN CALCIUM 20 MG: 20 TABLET, FILM COATED ORAL at 10:44

## 2021-11-26 RX ADMIN — FLUTICASONE PROPIONATE 50 MCG: 50 SPRAY, METERED NASAL at 06:24

## 2021-11-26 RX ADMIN — FUROSEMIDE 40 MG: 10 INJECTION, SOLUTION INTRAMUSCULAR; INTRAVENOUS at 17:52

## 2021-11-26 RX ADMIN — OMEPRAZOLE 20 MG: 20 CAPSULE, DELAYED RELEASE ORAL at 10:43

## 2021-11-26 RX ADMIN — PREDNISONE 40 MG: 20 TABLET ORAL at 06:25

## 2021-11-26 RX ADMIN — BUDESONIDE AND FORMOTEROL FUMARATE DIHYDRATE 2 PUFF: 160; 4.5 AEROSOL RESPIRATORY (INHALATION) at 07:01

## 2021-11-26 RX ADMIN — ENALAPRILAT 1.25 MG: 1.25 INJECTION INTRAVENOUS at 05:21

## 2021-11-26 RX ADMIN — ENOXAPARIN SODIUM 40 MG: 40 INJECTION SUBCUTANEOUS at 06:24

## 2021-11-26 RX ADMIN — EZETIMIBE 10 MG: 10 TABLET ORAL at 17:52

## 2021-11-26 RX ADMIN — ASPIRIN 81 MG: 81 TABLET, COATED ORAL at 10:43

## 2021-11-26 RX ADMIN — METOPROLOL SUCCINATE 25 MG: 25 TABLET, EXTENDED RELEASE ORAL at 06:24

## 2021-11-26 RX ADMIN — FUROSEMIDE 40 MG: 10 INJECTION, SOLUTION INTRAMUSCULAR; INTRAVENOUS at 10:43

## 2021-11-26 RX ADMIN — LEVOTHYROXINE SODIUM 25 MCG: 0.03 TABLET ORAL at 06:25

## 2021-11-26 ASSESSMENT — ENCOUNTER SYMPTOMS
BLURRED VISION: 0
VOMITING: 0
SORE THROAT: 0
PALPITATIONS: 0
ORTHOPNEA: 1
WEAKNESS: 1
DOUBLE VISION: 0
SHORTNESS OF BREATH: 1
HEARTBURN: 0
NAUSEA: 0
COUGH: 1
SPUTUM PRODUCTION: 1
HEMOPTYSIS: 0
ABDOMINAL PAIN: 0
DIZZINESS: 0
MYALGIAS: 0
SINUS PAIN: 0
HEADACHES: 0
CHILLS: 0
DIARRHEA: 0
CONSTIPATION: 0
BACK PAIN: 1
WHEEZING: 0
DEPRESSION: 0
FEVER: 0

## 2021-11-26 ASSESSMENT — PAIN DESCRIPTION - PAIN TYPE
TYPE: ACUTE PAIN
TYPE: ACUTE PAIN

## 2021-11-26 ASSESSMENT — LIFESTYLE VARIABLES: SUBSTANCE_ABUSE: 0

## 2021-11-26 NOTE — THERAPY
Speech Language Pathology   Clinical Swallow Evaluation     Patient Name: Thalia Horton  AGE:  89 y.o., SEX:  female  Medical Record #: [id number]  Today's Date: 11/26/2021     Precautions  Precautions: Fall Risk  Comments: need to confirm weightbearing on LUE (assumed NWB)    Assessment    Patient is  89 y.o. female admitted 11/23/2021 with shortness of breath and productive cough.  On admission patient found to have a an acute COPD exacerbation requiring submental oxygen.     Patient participated in clinical swallow evaluation this date.     Exam    Patient with symmetrical facial features. Tongue protrusion to midline. ROM and strength of oral musculature found to be WFL. Patient with adequate oral acceptance and containment. Adequate bite and mastication of presented bolus. Presumed timely AP transit and timely swallow trigger. The patient consumed PO trials of regular texture meal and thin liquids with no overt s/sx of aspiration. Vocal quality remained clear and the patient denied globus sensation.     Clinical Impressions:    No clinical s/sx of aspiration were appreciated this evaluation. Patient with recent MBSS 6 months ago revealed no aspiration events and patient denies any difficulty swallowing at this time.     Recommendations:    Initiation of diet regular with thin liquids.     Swallowing instructions/precautions:   Supervision: Independent  Positioning: Seat fully upright and midline when eating  Medication: Whole with liquid wash     Plan    Recommend Speech Therapy for Evaluation only for the following treatments:  Dysphagia Training.    Discharge Recommendations: (P) Anticipate that the patient will have no further speech therapy needs after discharge from the hospital     Objective       11/26/21 1041   Oral Motor Eval    Is Patient Able to Complete Oral Motor Eval Yes, Within Normal Limits   Laryngeal Function   Voice Quality Within Functional Limits   Volutional Cough Within Functional  Limits   Excursion Upon Swallow Complete   Oral Food Presentation   Ice Chips Within Functional Limits   Single Swallow Thin (0) Within Functional Limits   Serial Swallow Thin (0) Within Functional Limits   Regular (7) Within Functional Limits   Self Feeding Independent   Tracheostomy   Tracheostomy  No   Dysphagia Strategies / Recommendations   Strategies / Interventions Recommended (Yes / No) Yes   Compensatory Strategies Head of Bed 90 Degrees During Eating / Drinking   Diet / Liquid Recommendation Thin (0)   Dysphagia Rating   Nutritional Liquid Intake Rating Scale Non thickened beverages   Nutritional Food Intake Rating Scale Total oral diet with no restrictions   Education Group   Education Provided Dysphagia   Dysphagia Patient Response Patient;Acceptance;Explanation;Verbal Demonstration   Problem List   Problem List Dysphagia   Anticipated Discharge Needs   Discharge Recommendations Anticipate that the patient will have no further speech therapy needs after discharge from the hospital   Therapy Recommendations Upon DC Dysphagia Training   Interdisciplinary Plan of Care Collaboration   IDT Collaboration with  Nursing   Patient Position at End of Therapy In Bed;Bed Alarm On;Tray Table within Reach;Call Light within Reach   Collaboration Comments RN aware   Session Information   Date / Session Number 11/26: 1 (1/3 12/2)   Priority 0

## 2021-11-26 NOTE — CARE PLAN
The patient is Stable - Low risk of patient condition declining or worsening    Shift Goals  Clinical Goals: Safety  Patient Goals: Rest    Progress made toward(s) clinical / shift goals:    Problem: Knowledge Deficit - Standard  Goal: Patient and family/care givers will demonstrate understanding of plan of care, disease process/condition, diagnostic tests and medications  Outcome: Progressing     Problem: Fall Risk  Goal: Patient will remain free from falls  Outcome: Progressing     Problem: Hemodynamics  Goal: Patient's hemodynamics, fluid balance and neurologic status will be stable or improve  Outcome: Progressing     Problem: Respiratory  Goal: Patient will achieve/maintain optimum respiratory ventilation and gas exchange  Outcome: Progressing     Problem: Mobility  Goal: Patient's capacity to carry out activities will improve  Outcome: Progressing     Problem: Infection - Standard  Goal: Patient will remain free from infection  Outcome: Progressing     Problem: Knowledge Deficit - COPD  Goal: Patient/significant other demonstrates understanding of disease process, utilization of the Action Plan, medications and discharge instruction  Outcome: Progressing     Problem: Risk for Infection - COPD  Goal: Patient will remain free from signs and symptoms of infection  Outcome: Progressing     Problem: Nutrition - Advanced  Goal: Patient will display progressive weight gain toward goal have adequate food and fluid intake  Outcome: Progressing     Problem: Ineffective Airway Clearance  Goal: Patient will maintain patent airway with clear/clearing breath sounds  Outcome: Progressing     Problem: Impaired Gas Exchange  Goal: Patient will demonstrate improved ventilation and adequate oxygenation and participate in treatment regimen within the level of ability/situation.  Outcome: Progressing     Problem: Risk for Aspiration  Goal: Patient's risk for aspiration will be absent or decrease  Outcome: Progressing     Problem:  Self Care  Goal: Patient will have the ability to perform ADLs independently or with assistance (bathe, groom, dress, toilet and feed)  Outcome: Progressing       Patient is not progressing towards the following goals:

## 2021-11-26 NOTE — ASSESSMENT & PLAN NOTE
New diagnosis.  Likely secondary to COPD.   Echo reveals rvsp of 45  BNP 3032  CXR with pulmonary edema  Aggressive diuresis with IV lasix  Wean o2 as tolerated.

## 2021-11-26 NOTE — PROGRESS NOTES
Hospital Medicine Daily Progress Note    Date of Service  11/26/2021    Chief Complaint  Thalia Horton is a 89 y.o. female admitted 11/23/2021 with shortness of breath and productive cough    Hospital Course  This is a 89-year-old female with a past medical history of hypothyroidism, hypertension, GERD, dyslipidemia, COPD admitted with complaints of worsening shortness of breath, sore throat, and productive cough.  On admission patient found to have a an acute COPD exacerbation requiring submental oxygen.  She does not use oxygen at home.  Initiated on aggressive RT protocol.    Interval Problem Update  11/24: Continues to require 5 L supplemental oxygen.  She does report overall shortness of breath improved.  Does continue to have productive cough.  Initiate IV steroids.  Continue aggressive RT protocol.  Patient found to be generally weak.  Continue to work with therapy.  11/25: Respiratory status much improved this morning.  Oxygen has been weaned to 3 L.  Anticipate patient will need home oxygen on discharge.  Long discussion with family concerning other comorbidities.  Family reports intermittent orthostatic hypotension at home.  Her blood pressure has remained stable during this hospital stay.  She currently takes metoprolol which was prescribed in the past for episode of arrhythmia and what appears to be at the setting of electrolyte imbalance.  We discussed the probability that metoprolol may dropping her blood pressure at times.  The patient does have a upcoming appointment with cardiology to discuss ongoing need for metoprolol.  Will monitor blood pressure closely in the hospital.  Family would also like SLP evaluation due to concern of aspiration.  Consult pending.  Patient appears to be at his mental baseline.  She reports breathing easier.  Denies any acute needs at this time.  11/26:  Acute worsening of respiratory status.  Found to have pulmonary HTN and pulmonary edema on workup.  Initiated  on IV lasix.  Wean o2 as tolerated.  Patient reports feeling weaker today.  Work of breathing is increased.  VSS.  Afebrile.     I have personally seen and examined the patient at bedside. I discussed the plan of care with patient.    Consultants/Specialty  None    Code Status  DNAR/DNI    Disposition  Patient is not medically cleared.   Anticipate discharge to to home with close outpatient follow-up.  Home health referral placed.  I have placed the appropriate orders for post-discharge needs.    Review of Systems  Review of Systems   Constitutional: Positive for malaise/fatigue. Negative for chills and fever.   HENT: Negative for congestion and sore throat.    Eyes: Negative for blurred vision and double vision.   Respiratory: Positive for cough, sputum production and shortness of breath.    Cardiovascular: Negative for chest pain and leg swelling.   Gastrointestinal: Negative for abdominal pain, constipation, diarrhea, nausea and vomiting.   Genitourinary: Negative for dysuria.   Musculoskeletal: Negative for myalgias.   Skin: Negative for itching and rash.   Neurological: Positive for weakness. Negative for dizziness and headaches.        Physical Exam  Temp:  [36.1 °C (97 °F)-36.6 °C (97.8 °F)] 36.1 °C (97 °F)  Pulse:  [76-88] 77  Resp:  [17-22] 22  BP: (108-165)/(52-83) 125/67  SpO2:  [93 %-96 %] 93 %    Physical Exam  Vitals and nursing note reviewed.   Constitutional:       General: She is not in acute distress.     Appearance: Normal appearance. She is ill-appearing.   HENT:      Head: Normocephalic and atraumatic.      Nose: Nose normal. No congestion.      Mouth/Throat:      Mouth: Mucous membranes are dry.      Pharynx: Oropharynx is clear.   Eyes:      General:         Right eye: No discharge.         Left eye: No discharge.      Conjunctiva/sclera: Conjunctivae normal.   Cardiovascular:      Rate and Rhythm: Normal rate and regular rhythm.      Pulses: Normal pulses.      Heart sounds: Normal heart  sounds. No murmur heard.      Pulmonary:      Effort: Tachypnea present.      Breath sounds: Examination of the right-middle field reveals decreased breath sounds. Examination of the right-lower field reveals decreased breath sounds. Examination of the left-lower field reveals decreased breath sounds. Decreased breath sounds present.   Abdominal:      General: Bowel sounds are normal. There is no distension.      Palpations: Abdomen is soft.      Tenderness: There is no abdominal tenderness.   Musculoskeletal:      Cervical back: Normal range of motion and neck supple. No rigidity or tenderness. No muscular tenderness.      Right lower leg: No edema.      Left lower leg: No edema.      Comments: Generalized weakness   Skin:     General: Skin is warm and dry.      Coloration: Skin is not jaundiced or pale.   Neurological:      General: No focal deficit present.      Mental Status: She is alert and oriented to person, place, and time. Mental status is at baseline.   Psychiatric:         Mood and Affect: Mood normal.         Behavior: Behavior normal.         Thought Content: Thought content normal.         Judgment: Judgment normal.         Fluids  No intake or output data in the 24 hours ending 11/26/21 1315    Laboratory  Recent Labs     11/23/21  2044 11/24/21  0707 11/26/21  0932   WBC 10.9* 10.5 13.2*   RBC 4.40 4.23 4.33   HEMOGLOBIN 13.8 13.5 13.6   HEMATOCRIT 42.8 40.9 42.2   MCV 97.3 96.7 97.5   MCH 31.4 31.9 31.4   MCHC 32.2* 33.0* 32.2*   RDW 46.7 46.1 46.9   PLATELETCT 181 165 188   MPV 9.2 9.5 9.4     Recent Labs     11/23/21  2044 11/24/21  0707 11/26/21  0932   SODIUM 139 137 143   POTASSIUM 3.6 4.1 3.9   CHLORIDE 101 103 107   CO2 25 26 27   GLUCOSE 149* 147* 129*   BUN 14 11 21   CREATININE 0.69 0.45* 0.53   CALCIUM 9.6 8.9 9.6                   Imaging  EC-ECHOCARDIOGRAM COMPLETE W/O CONT   Final Result      DX-CHEST-LIMITED (1 VIEW)   Final Result         Congestive heart failure.      CT-CTA CHEST  PULMONARY ARTERY W/ RECONS   Final Result      1.  No CT evidence of pulmonary embolism      2.  Aspiration with new mediastinal adenopathy that ist most likely reactive      3.  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  Multichamber cardiac enlargement similar to comparison      DX-CHEST-PORTABLE (1 VIEW)   Final Result      Cardiac silhouette enlargement without edema identified      Subpleural biapical opacity likely indicates pleural parenchymal scarring. This could obscure an underlying nodule.      Suspicious for a 7 mm pulmonary nodule lateral to the right hilus. Alternatively this could be a vessel on end. CT could clarify if clinically indicated           Assessment/Plan  * Acute respiratory failure with hypoxia (HCC)- (present on admission)  Assessment & Plan  Saturating in the 80s at rest on room air, 70s with ambulation. Requiring 2 L O2 in the ED at rest  Likely due to COPD exacerbation  Doubt pneumonia, procalcitonin negative, no obvious consolidation seen on CXR  CTA negative for PE  Continue plan as above and wean oxygen as tolerated.  11/26:  Acute worsening of respiratory status overnight.  CXR consistent with pulmonary edema.  Echo findings consistent with pulmonary HTN.  Initiated on lasix.  Continue supplemental oxygen.       Pulmonary hypertension (HCC)  Assessment & Plan  New diagnosis.  Likely secondary to COPD.   Echo reveals rvsp of 45  BNP 3032  CXR with pulmonary edema  Aggressive diuresis with IV lasix  Wean o2 as tolerated.     Aortic aneurysm, descending (HCC)  Assessment & Plan  Incidental finding on CTA  3.5 cm   Maintain optimal BP control  Annual interval imaging.     Generalized weakness  Assessment & Plan  PT/OT recommending home health.  Referral placed.    Anxiety and depression- (present on admission)  Assessment & Plan  Continue escitalopram     Closed fracture of left upper extremity with routine healing- (present on  admission)  Assessment & Plan  Left wrist  Present on admit  Follows with Dr. Adame outpatient  [facility].     DNR (do not resuscitate)- (present on admission)  Assessment & Plan  Confirmed DNR/DNA with pt    Essential hypertension- (present on admission)  Assessment & Plan  Reported frequent episodes of orthostatic hypotension at home.  Blood pressure while in the hospital appears stable.  She is on metoprolol at home secondary to history of arrhythmia which appears to have been in the setting of electrolyte imbalance.  Patient may no longer require metoprolol.  Discontinue this medication may improve episodes of orthostatic hypotension.  Patient has outpatient appointment with cardiology in 1 week.  Recommended to address ongoing need for metoprolol.    Chronic obstructive pulmonary disease with acute exacerbation (HCC)- (present on admission)  Assessment & Plan  Has chronic cough, increased with sputum production. Increased shortness of breath  Procalcitonin negative, no PE or obvious consolidation seen on CT, pending read  Initiate IV Solu-Medrol.  Continue azithromycin.  Initiate Symbicort.  Continue as needed breathing treatments.  Wean oxygen as tolerated.  May need to discharge with supplemental oxygen.  11/25: Oxygen requirements have mildly decreased overnight.  Lung sounds have improved.  Transition back to oral prednisone.  Anticipate patient will need home oxygen at discharge.  She will be continued on maintenance inhaler with Symbicort.  Outpatient pulmonary follow-up.  11/26:  Exacerbation appears much improved.     Gastroesophageal reflux disease without esophagitis- (present on admission)  Assessment & Plan  Continue pantoprazole     Mixed hyperlipidemia- (present on admission)  Assessment & Plan  Continue atorvastatin and ezetimibe     Acquired hypothyroidism- (present on admission)  Assessment & Plan  Continue levothyroxine        VTE prophylaxis: SCDs/TEDs and enoxaparin ppx    I have performed  a physical exam and reviewed and updated ROS and Plan today (11/26/2021). In review of yesterday's note (11/25/2021), there are no changes except as documented above.

## 2021-11-26 NOTE — PROGRESS NOTES
Assumed care of patient, bedside report received from VISHNU Michelle. Updated on POC, call light within reach and fall precautions in place. Bed locked and in lowest position. Patient instructed to call for assistance before getting out of bed. All questions answered, no other needs at this time.     COVID-19 surge in effect

## 2021-11-27 VITALS
RESPIRATION RATE: 17 BRPM | OXYGEN SATURATION: 94 % | BODY MASS INDEX: 24.62 KG/M2 | HEART RATE: 73 BPM | DIASTOLIC BLOOD PRESSURE: 63 MMHG | WEIGHT: 144.18 LBS | HEIGHT: 64 IN | SYSTOLIC BLOOD PRESSURE: 103 MMHG | TEMPERATURE: 98.5 F

## 2021-11-27 LAB
ANION GAP SERPL CALC-SCNC: 10 MMOL/L (ref 7–16)
BASOPHILS # BLD AUTO: 0.1 % (ref 0–1.8)
BASOPHILS # BLD: 0.01 K/UL (ref 0–0.12)
BUN SERPL-MCNC: 21 MG/DL (ref 8–22)
CALCIUM SERPL-MCNC: 9.1 MG/DL (ref 8.5–10.5)
CHLORIDE SERPL-SCNC: 97 MMOL/L (ref 96–112)
CO2 SERPL-SCNC: 30 MMOL/L (ref 20–33)
CREAT SERPL-MCNC: 0.53 MG/DL (ref 0.5–1.4)
EOSINOPHIL # BLD AUTO: 0 K/UL (ref 0–0.51)
EOSINOPHIL NFR BLD: 0 % (ref 0–6.9)
ERYTHROCYTE [DISTWIDTH] IN BLOOD BY AUTOMATED COUNT: 46 FL (ref 35.9–50)
GLUCOSE SERPL-MCNC: 147 MG/DL (ref 65–99)
HCT VFR BLD AUTO: 40 % (ref 37–47)
HGB BLD-MCNC: 13 G/DL (ref 12–16)
IMM GRANULOCYTES # BLD AUTO: 0.04 K/UL (ref 0–0.11)
IMM GRANULOCYTES NFR BLD AUTO: 0.4 % (ref 0–0.9)
LYMPHOCYTES # BLD AUTO: 1.18 K/UL (ref 1–4.8)
LYMPHOCYTES NFR BLD: 11.4 % (ref 22–41)
MCH RBC QN AUTO: 31.6 PG (ref 27–33)
MCHC RBC AUTO-ENTMCNC: 32.5 G/DL (ref 33.6–35)
MCV RBC AUTO: 97.1 FL (ref 81.4–97.8)
MONOCYTES # BLD AUTO: 0.9 K/UL (ref 0–0.85)
MONOCYTES NFR BLD AUTO: 8.7 % (ref 0–13.4)
NEUTROPHILS # BLD AUTO: 8.26 K/UL (ref 2–7.15)
NEUTROPHILS NFR BLD: 79.4 % (ref 44–72)
NRBC # BLD AUTO: 0 K/UL
NRBC BLD-RTO: 0 /100 WBC
PLATELET # BLD AUTO: 202 K/UL (ref 164–446)
PMV BLD AUTO: 9.8 FL (ref 9–12.9)
POTASSIUM SERPL-SCNC: 3.5 MMOL/L (ref 3.6–5.5)
RBC # BLD AUTO: 4.12 M/UL (ref 4.2–5.4)
SODIUM SERPL-SCNC: 137 MMOL/L (ref 135–145)
WBC # BLD AUTO: 10.4 K/UL (ref 4.8–10.8)

## 2021-11-27 PROCEDURE — 94669 MECHANICAL CHEST WALL OSCILL: CPT

## 2021-11-27 PROCEDURE — 85025 COMPLETE CBC W/AUTO DIFF WBC: CPT

## 2021-11-27 PROCEDURE — 94760 N-INVAS EAR/PLS OXIMETRY 1: CPT

## 2021-11-27 PROCEDURE — 700102 HCHG RX REV CODE 250 W/ 637 OVERRIDE(OP): Performed by: NURSE PRACTITIONER

## 2021-11-27 PROCEDURE — 97110 THERAPEUTIC EXERCISES: CPT

## 2021-11-27 PROCEDURE — A9270 NON-COVERED ITEM OR SERVICE: HCPCS | Performed by: STUDENT IN AN ORGANIZED HEALTH CARE EDUCATION/TRAINING PROGRAM

## 2021-11-27 PROCEDURE — 700111 HCHG RX REV CODE 636 W/ 250 OVERRIDE (IP): Performed by: NURSE PRACTITIONER

## 2021-11-27 PROCEDURE — 94640 AIRWAY INHALATION TREATMENT: CPT

## 2021-11-27 PROCEDURE — 80048 BASIC METABOLIC PNL TOTAL CA: CPT

## 2021-11-27 PROCEDURE — 700111 HCHG RX REV CODE 636 W/ 250 OVERRIDE (IP): Performed by: STUDENT IN AN ORGANIZED HEALTH CARE EDUCATION/TRAINING PROGRAM

## 2021-11-27 PROCEDURE — 36415 COLL VENOUS BLD VENIPUNCTURE: CPT

## 2021-11-27 PROCEDURE — 700102 HCHG RX REV CODE 250 W/ 637 OVERRIDE(OP): Performed by: STUDENT IN AN ORGANIZED HEALTH CARE EDUCATION/TRAINING PROGRAM

## 2021-11-27 PROCEDURE — 97535 SELF CARE MNGMENT TRAINING: CPT

## 2021-11-27 PROCEDURE — 97530 THERAPEUTIC ACTIVITIES: CPT

## 2021-11-27 PROCEDURE — A9270 NON-COVERED ITEM OR SERVICE: HCPCS | Performed by: NURSE PRACTITIONER

## 2021-11-27 RX ORDER — FUROSEMIDE 20 MG/1
20 TABLET ORAL DAILY
Qty: 30 TABLET | Refills: 0 | Status: SHIPPED | OUTPATIENT
Start: 2021-11-27 | End: 2021-12-17

## 2021-11-27 RX ORDER — BUDESONIDE AND FORMOTEROL FUMARATE DIHYDRATE 160; 4.5 UG/1; UG/1
2 AEROSOL RESPIRATORY (INHALATION) 2 TIMES DAILY
Qty: 10.2 G | Refills: 2 | Status: SHIPPED | OUTPATIENT
Start: 2021-11-27 | End: 2021-11-27

## 2021-11-27 RX ORDER — POTASSIUM CHLORIDE 20 MEQ/1
40 TABLET, EXTENDED RELEASE ORAL ONCE
Status: COMPLETED | OUTPATIENT
Start: 2021-11-27 | End: 2021-11-27

## 2021-11-27 RX ADMIN — OMEPRAZOLE 20 MG: 20 CAPSULE, DELAYED RELEASE ORAL at 05:30

## 2021-11-27 RX ADMIN — BUDESONIDE AND FORMOTEROL FUMARATE DIHYDRATE 2 PUFF: 160; 4.5 AEROSOL RESPIRATORY (INHALATION) at 07:28

## 2021-11-27 RX ADMIN — ENOXAPARIN SODIUM 40 MG: 40 INJECTION SUBCUTANEOUS at 05:30

## 2021-11-27 RX ADMIN — PREDNISONE 40 MG: 20 TABLET ORAL at 05:30

## 2021-11-27 RX ADMIN — ASPIRIN 81 MG: 81 TABLET, COATED ORAL at 05:30

## 2021-11-27 RX ADMIN — ESCITALOPRAM OXALATE 10 MG: 10 TABLET ORAL at 05:30

## 2021-11-27 RX ADMIN — POTASSIUM CHLORIDE 40 MEQ: 1500 TABLET, EXTENDED RELEASE ORAL at 09:14

## 2021-11-27 RX ADMIN — METOPROLOL SUCCINATE 25 MG: 25 TABLET, EXTENDED RELEASE ORAL at 05:30

## 2021-11-27 RX ADMIN — FUROSEMIDE 40 MG: 10 INJECTION, SOLUTION INTRAMUSCULAR; INTRAVENOUS at 05:30

## 2021-11-27 RX ADMIN — ATORVASTATIN CALCIUM 20 MG: 20 TABLET, FILM COATED ORAL at 05:30

## 2021-11-27 RX ADMIN — LEVOTHYROXINE SODIUM 25 MCG: 0.03 TABLET ORAL at 05:30

## 2021-11-27 ASSESSMENT — GAIT ASSESSMENTS
DEVIATION: SHUFFLED GAIT
DISTANCE (FEET): 15
ASSISTIVE DEVICE: SINGLE POINT CANE
GAIT LEVEL OF ASSIST: MINIMAL ASSIST

## 2021-11-27 ASSESSMENT — COGNITIVE AND FUNCTIONAL STATUS - GENERAL
STANDING UP FROM CHAIR USING ARMS: A LITTLE
MOVING TO AND FROM BED TO CHAIR: A LITTLE
TURNING FROM BACK TO SIDE WHILE IN FLAT BAD: A LITTLE
MOVING FROM LYING ON BACK TO SITTING ON SIDE OF FLAT BED: A LITTLE
MOBILITY SCORE: 18
WALKING IN HOSPITAL ROOM: A LITTLE
SUGGESTED CMS G CODE MODIFIER MOBILITY: CK
CLIMB 3 TO 5 STEPS WITH RAILING: A LITTLE

## 2021-11-27 NOTE — THERAPY
Physical Therapy   Daily Treatment     Patient Name: Thalia Horton  Age:  89 y.o., Sex:  female  Medical Record #: [id number]  Today's Date: 11/27/2021     Precautions  Precautions: Fall Risk    Assessment    Pt with improving upright tolerance; discussed volume management as it relates to activity tolerance; aerobic exercise and should not be performing stairs for exercise; discussed the talk test and daily weights; pt became distracted when she asked RN if she was going home today and she is planned to do so; perseverative on an MD telling her she can stay (appaers was pulm yesterday from pt description); from a PT perspective, pt will require support from her family anytime she is out of bed, which she confirms; would benefit from home health PT for monitored progression of activity while she is fluid overloaded; pt very upset at the prospect of going home today, reports she would like to speak with physician. RN has confirmed plan with hospitalist service.     Plan    Continue current treatment plan.    DC Equipment Recommendations: None  Discharge Recommendations: Recommend home health for continued physical therapy services      Abridged Subjective/Objective     11/27/21 1240   Cognition    Cognition / Consciousness WDL   Level of Consciousness Alert   Comments pleasant and cooperative; became very upset when RN alerted her that she was going home; stating a physician said she could stay (from her report appears was pulmonary yesterday); eager to shower    Passive ROM Lower Body   Passive ROM Lower Body WDL   Strength Lower Body   Lower Body Strength  WDL   Sensation Lower Body   Lower Extremity Sensation   WDL   Supine Lower Body Exercise   Comments pt concerned about left wrist ROM/pain, performed medial directed ulnar/carpal glide grade 2-3, improved wrist extension post;    Sitting Lower Body Exercises   Sitting Lower Body Exercises Yes   Long Arc Quad 1 set of 10;Bilateral   Marching 1 set of 10    Comments pre gait, denies dizziness throughout    Balance   Sitting Balance (Static) Good   Sitting Balance (Dynamic) Fair +   Standing Balance (Static) Fair   Standing Balance (Dynamic) Fair   Weight Shift Sitting Good   Weight Shift Standing Fair   Skilled Intervention Postural Facilitation;Sequencing;Tactile Cuing;Verbal Cuing   Comments right UE support, no loss of balance in moving enviornment    Gait Analysis   Gait Level Of Assist Minimal Assist   Assistive Device Single Point Cane   Distance (Feet) 15   # of Times Distance was Traveled 3   Deviation Shuffled Gait   Weight Bearing Status LUE WB not clarified   Vision Deficits Impacting Mobility denies    Skilled Intervention Postural Facilitation;Sequencing;Tactile Cuing;Verbal Cuing   Comments distance limited by urinary incontience    Bed Mobility    Supine to Sit Modified Independent   Sit to Supine   (NT, sitting in recliner post)   Functional Mobility   Sit to Stand Supervised   Bed, Chair, Wheelchair Transfer Supervised   Short Term Goals    Short Term Goal # 1 pt will move supine<>EOB with HOB and spv in 6 visits to facilitate getting in/out of bed   Goal Outcome # 1 Goal met   Short Term Goal # 2 pt will complete all transfers with spc and spv in 6 visits to increase household independence.   Goal Outcome # 2 Goal met   Short Term Goal # 3 pt will amb 150' w spc and spv in 6 visits for household mobility.   Goal Outcome # 3 Goal not met   Short Term Goal # 4 pt will negotiate 2 stairs with spv in 6 visits for home entry.   Goal Outcome # 4 Goal not met

## 2021-11-27 NOTE — DISCHARGE SUMMARY
Discharge Summary    CHIEF COMPLAINT ON ADMISSION  Chief Complaint   Patient presents with   • Shortness of Breath   • Sore Throat   • Cough       Reason for Admission  EMS     Admission Date  11/23/2021    CODE STATUS  DNAR/DNI    HPI & HOSPITAL COURSE  This is a 89-year-old female with a past medical history of hypothyroidism, hypertension, GERD, dyslipidemia, COPD admitted with complaints of worsening shortness of breath, sore throat, and productive cough.  On admission patient found to have acute COPD exacerbation requiring submental oxygen.  Infectious workup negative.  She does use nocturnal oxygen at home.  Now requiring continuous supplemental oxygen.  Initiated on aggressive RT protocol, symbicort, and steroids.      Her hospital stay was complicated by development of pulmonary edema.  Echocardiogram revealed evidence of pulmonary hypertension with rvsp of 45.  Suspect secondary to underlying COPD.  She was initiated on lasix with improvement of her respiratory status.  She will continue lasix at discharge.     Family reports intermittent orthostatic hypotension at home.  Her blood pressure has remained stable during this hospital stay.  Cortisol is within normal limits.  She currently takes metoprolol which was prescribed in the past for episode of arrhythmia in what appears to be the setting of electrolyte imbalance which has resolved.  We discussed the probability that metoprolol affecting her blood pressure at home.  The patient does have an upcoming appointment with cardiology to discuss ongoing need for metoprolol.  Patient also incidentally found to have 3.5 cm descending aortic aneurysm.  Recommend to maintain optimal blood pressure control at home and undergo annual interval imaging for monitoring.     At this time the patient has no further need for acute intervention.  She will be discharged home with home health therapy.  Home oxygen has been organized.  Recommend close follow up with outpatient  pulmonary and cardiology.     Therefore, she is discharged in good and stable condition to home with organized home healthcare and close outpatient follow-up.    The patient met 2-midnight criteria for an inpatient stay at the time of discharge.    Discharge Date  11/27/2021    FOLLOW UP ITEMS POST DISCHARGE  -Continuous home oxygen.  Check o2 saturations regularly.  -Follow with cardiology concerning pulmonary hypertension and blood pressure control.  Recommend checking blood pressure daily at home and record values.  -Annual imaging for monitoring of aortic aneurysm.    -Pulmonary follow up.     DISCHARGE DIAGNOSES  Principal Problem:    Acute respiratory failure with hypoxia (HCC) POA: Yes  Active Problems:    Acquired hypothyroidism POA: Yes    Mixed hyperlipidemia POA: Yes    Gastroesophageal reflux disease without esophagitis POA: Yes    Chronic obstructive pulmonary disease with acute exacerbation (HCC) POA: Yes    Essential hypertension POA: Yes      Overview: Chronic in nature.  Blood pressure today is 122/68 and her pulse is 84.        She is taking metoprolol 25 mg p.o. daily.  We discussed decreasing the       dose of metoprolol today related to lower blood pressures, recent falls.        At her last appointment Jadiel Nicolasmon did indicate concern for       orthostatic hypotension and did recommend that she increase salt in her       diet as she had been on a no salt diet in the facility she was living.        Patient denies any chest pain, palpitations, blurry vision.    DNR (do not resuscitate) POA: Yes    Closed fracture of left upper extremity with routine healing POA: Yes      Overview: Patient continues follow-up with orthopedics.  This is overall healing       well and patient is using over-the-counter medications for pain.  This is       contributing to her balance issues as she is having difficulty using her       walker.  As well as stating that the cast is weighing her down on that        side.    Anxiety and depression POA: Yes    Generalized weakness POA: Unknown    Aortic aneurysm, descending (HCC) POA: Unknown    Pulmonary hypertension (HCC) POA: Unknown  Resolved Problems:    * No resolved hospital problems. *      FOLLOW UP  Future Appointments   Date Time Provider Department Center   11/29/2021  9:00 AM DOE [address]   12/1/2021  1:15 PM Joshua Teresa M.D. [facility] None   12/27/2021  8:20 AM Srikanth Dallas M.D. [facility] None     [facility]  [address] [facility] [address]  [phone]        [facility]monary Medicine  [address], [facility] [address]  [facility] [id number] [phone]  Schedule an appointment as soon as possible for a visit in 6 weeks  follow up after hospital admission, suspected COPD, nocturnal hypoxemia, no pfts, no sleep studies      MEDICATIONS ON DISCHARGE     Medication List      START taking these medications      Instructions   budesonide-formoterol 160-4.5 MCG/ACT Aero  Commonly known as: SYMBICORT   Inhale 2 Puffs 2 times a day.  Dose: 2 Puff     furosemide 20 MG Tabs  Commonly known as: LASIX   Take 1 Tablet by mouth every day for 30 days.  Dose: 20 mg        CONTINUE taking these medications      Instructions   albuterol 108 (90 Base) MCG/ACT Aers inhalation aerosol   INHALE 2 PUFFS BY MOUTH EVERY 6 HOURS AS NEEDED FOR SHORTNESS OF BREATH     aspirin EC 81 MG Tbec  Commonly known as: ECOTRIN   Take 81 mg by mouth every day.  Dose: 81 mg     atorvastatin 20 MG Tabs  Commonly known as: LIPITOR   TAKE 1 TABLET BY MOUTH EVERY DAY FOR CHOLESTEROL     escitalopram 10 MG Tabs  Commonly known as: Lexapro   TAKE 1 TABLET BY MOUTH EVERY DAY  Dose: 10 mg     ezetimibe 10 MG Tabs  Commonly known as: ZETIA   TAKE 1 TABLET BY MOUTH EVERY DAY FOR CHOLESTEROL/ STROKE PREVENTION     fluticasone 50 MCG/ACT nasal spray  Commonly known as: FLONASE   ADMINISTER 1 SPRAY INTO AFFECTED NOSTRIL(S) EVERY DAY  Dose: 1 Spray      levothyroxine 25 MCG Tabs  Commonly known as: SYNTHROID   TAKE 1 TABLET BY MOUTH EVERY MORNING ON AN EMPTY STOMACH FOR THYROID     metoprolol SR 25 MG Tb24  Commonly known as: TOPROL XL   TAKE 1 TABLET BY MOUTH EVERY DAY     multivitamin Tabs   Take 1 Tablet by mouth every day.  Dose: 1 Tablet     pantoprazole 40 MG Tbec  Commonly known as: PROTONIX   Take 40 mg by mouth every day.  Dose: 40 mg     vitamin B-12 1000 MCG Tabs   Take 1,000 mcg by mouth every day.  Dose: 1,000 mcg     Vitamin D3 2000 UNIT Caps   Take 2,000 Units by mouth every day.  Dose: 2,000 Units            Allergies  Allergies   Allergen Reactions   • Penicillins Vomiting       DIET  Orders Placed This Encounter   Procedures   • Diet Order Diet: Regular     Standing Status:   Standing     Number of Occurrences:   1     Order Specific Question:   Diet:     Answer:   Regular [1]       ACTIVITY  As tolerated.  Weight bearing as tolerated    CONSULTATIONS  Pulmonary     LABORATORY  Lab Results   Component Value Date    SODIUM 137 11/27/2021    POTASSIUM 3.5 (L) 11/27/2021    CHLORIDE 97 11/27/2021    CO2 30 11/27/2021    GLUCOSE 147 (H) 11/27/2021    BUN 21 11/27/2021    CREATININE 0.53 11/27/2021        Lab Results   Component Value Date    WBC 10.4 11/27/2021    HEMOGLOBIN 13.0 11/27/2021    HEMATOCRIT 40.0 11/27/2021    PLATELETCT 202 11/27/2021        Total time of the discharge process was 35 minutes.

## 2021-11-27 NOTE — PROGRESS NOTES
Pulmonary Progress Note    Date of Service: 11/26/2021    Date of Admission:  11/23/2021  8:20 PM    Chief Complaint:  Shortness of Breath, Sore Throat, and Cough      History of Present Illness/Hospital Course:  Thalia Horton is a 89 y.o. female with a past medical history of hypothyroidism, hypertension, ?COPD presented to the hospital with complaints of shortness of breath and cough.She woke up yesterday morning with symptoms of sorethroat, productive cough and intermittent shortness of breath. She denies any fever/chills, myalgia/chills and pedal edema. She reports smoking for 25-30 years about 1 pack/day and quit 30 years ago. She has history of nocturnal hypoxia diagnosed via overnight pulse oximetry on 8/2021 and was advised to use oxygen at night which she is not compliant on. She has no documented PFT's on chart and uses only albuterol as needed. D-dimer was elevated at 2.39 and CTA chest done in the hospital was negative for PE and COVID negative. She is requiring about 5L O2 to maintain o2 sats in low 90's. Pulmonology was consulted for possible suspected COPD exacerbation.    S:   Patient reported feeling weak and worsening breathing today. States that SOB more when she was lying down in bed. Denies fever,chills,chest pain. No worsening of cough. Till yesterday she required 3L of O2. But  Initially today was placed on 10 L of O2 oxy mask. Received a dose of IV lasix. On reevaluation - O2 requirement came down to 3L     Review of Systems   Constitutional: Positive for malaise/fatigue. Negative for chills and fever.   HENT: Negative for congestion, sinus pain and sore throat.    Respiratory: Positive for cough, sputum production and shortness of breath. Negative for hemoptysis and wheezing.    Cardiovascular: Positive for orthopnea. Negative for chest pain and palpitations.   Gastrointestinal: Negative for heartburn.   Genitourinary: Negative for dysuria and urgency.   Musculoskeletal: Positive for  back pain.   Neurological: Negative for dizziness and headaches.   Psychiatric/Behavioral: Negative for depression, substance abuse and suicidal ideas.       Home Medications     Reviewed by Ayde Swartz R.N. (Registered Nurse) on 21 at 0022  Med List Status: Complete   Medication Last Dose Status   albuterol 108 (90 Base) MCG/ACT Aero Soln inhalation aerosol 2021 Active   aspirin EC (ECOTRIN) 81 MG Tablet Delayed Response 2021 Active   atorvastatin (LIPITOR) 20 MG Tab 2021 Active   Cholecalciferol (VITAMIN D3) 2000 UNIT Cap 2021 Active   Cyanocobalamin (VITAMIN B-12) 1000 MCG Tab 2021 Active   escitalopram (LEXAPRO) 10 MG Tab 2021 Active   ezetimibe (ZETIA) 10 MG Tab 2021 Active   fluticasone (FLONASE) 50 MCG/ACT nasal spray 2021 Active   levothyroxine (SYNTHROID) 25 MCG Tab 2021 Active   metoprolol SR (TOPROL XL) 25 MG TABLET SR 24 HR 2021 Active   multivitamin (THERAGRAN) Tab 2021 Active   pantoprazole (PROTONIX) 40 MG Tablet Delayed Response 2021 Active                Social History     Tobacco Use   • Smoking status: Former Smoker     Packs/day: 1.00     Years: 55.00     Pack years: 55.00     Types: Cigarettes     Quit date: 2013     Years since quittin.3   • Smokeless tobacco: Never Used   Vaping Use   • Vaping Use: Never used   Substance Use Topics   • Alcohol use: Yes     Alcohol/week: 6.0 oz     Types: 10 Glasses of wine per week     Comment: 1 -2    • Drug use: No        Past Medical History:   Diagnosis Date   • Chickenpox    • COPD (chronic obstructive pulmonary disease) (HCC)    • GERD (gastroesophageal reflux disease)    • [place] measles    • Heart palpitations    • Hyperlipidemia    • Osteoarthritis    • Thyroid disease        Past Surgical History:   Procedure Laterality Date   • HIP REPLACEMENT, TOTAL Left        Allergies: Penicillins    Family History   Problem Relation Age of Onset   • Stroke Mother 69  "      Pulmonary-Specific Vital Signs  Vitals  Blood Pressure : 136/76  Temperature: 36.7 °C (98 °F)  Temp src: Temporal  Pulse: 83  Respiration: 20  Pulse Oximetry: 95 %  Height: 162.6 cm (5' 4\")  Weight: 65.4 kg (144 lb 2.9 oz)    Physical Examination  Physical Exam  Constitutional:       General: She is not in acute distress.  HENT:      Head: Normocephalic and atraumatic.      Nose: Nose normal.      Mouth/Throat:      Mouth: Mucous membranes are moist.      Pharynx: Oropharynx is clear.   Eyes:      General: No scleral icterus.     Conjunctiva/sclera: Conjunctivae normal.   Cardiovascular:      Rate and Rhythm: Normal rate and regular rhythm.   Pulmonary:      Effort: Pulmonary effort is normal.      Comments: Bilateral crackles more at base  Musculoskeletal:      Cervical back: Neck supple.      Right lower leg: No edema.      Left lower leg: No edema.   Skin:     General: Skin is warm.      Capillary Refill: Capillary refill takes less than 2 seconds.   Neurological:      General: No focal deficit present.      Mental Status: She is alert.   Psychiatric:         Mood and Affect: Mood normal.         Behavior: Behavior normal.           No intake or output data in the 24 hours ending 11/26/21 1646    Recent Labs     11/23/21 2044 11/24/21  0707 11/26/21  0932   WBC 10.9* 10.5 13.2*   NEUTSPOLYS 75.90* 76.60* 88.80*   LYMPHOCYTES 14.30* 13.50* 5.60*   MONOCYTES 8.80 7.90 5.20   EOSINOPHILS 0.20 0.90 0.00   BASOPHILS 0.50 0.70 0.10   ASTSGOT 13  --  16   ALTSGPT 11  --  15   ALKPHOSPHAT 68  --  59   TBILIRUBIN 0.8  --  0.6     Recent Labs     11/23/21 2044 11/24/21  0707 11/26/21  0932   SODIUM 139 137 143   POTASSIUM 3.6 4.1 3.9   CHLORIDE 101 103 107   CO2 25 26 27   BUN 14 11 21   CREATININE 0.69 0.45* 0.53   MAGNESIUM  --   --  1.9   PHOSPHORUS  --   --  2.8   CALCIUM 9.6 8.9 9.6     Recent Labs     11/23/21 2044 11/24/21  0707 11/26/21  0932   ALTSGPT 11  --  15   ASTSGOT 13  --  16   ALKPHOSPHAT 68  --  " 59   TBILIRUBIN 0.8  --  0.6   GLUCOSE 149* 147* 129*         EC-ECHOCARDIOGRAM COMPLETE W/O CONT   Final Result      DX-CHEST-LIMITED (1 VIEW)   Final Result         Congestive heart failure.      CT-CTA CHEST PULMONARY ARTERY W/ RECONS   Final Result      1.  No CT evidence of pulmonary embolism      2.  Aspiration with new mediastinal adenopathy that ist most likely reactive      3.  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  Multichamber cardiac enlargement similar to comparison      DX-CHEST-PORTABLE (1 VIEW)   Final Result      Cardiac silhouette enlargement without edema identified      Subpleural biapical opacity likely indicates pleural parenchymal scarring. This could obscure an underlying nodule.      Suspicious for a 7 mm pulmonary nodule lateral to the right hilus. Alternatively this could be a vessel on end. CT could clarify if clinically indicated          Patient Active Problem List   Diagnosis   • Osteoarthritis of multiple joints   • Acquired hypothyroidism   • Mixed hyperlipidemia   • Atherosclerosis of both carotid arteries   • Gastroesophageal reflux disease without esophagitis   • Chronic obstructive pulmonary disease with acute exacerbation (HCC)   • History of angina pectoris   • TIA (transient ischemic attack)   • Mild cognitive impairment   • Neck pain   • Essential hypertension   • Chronic pain of both shoulders   • Balance problem   • Excessive daytime sleepiness   • Fall   • NSVT (nonsustained ventricular tachycardia) (HCC)   • Near syncope   • DNR (do not resuscitate)   • Prediabetes   • Nonsustained ventricular tachycardia (HCC)   • Atrial tachycardia (HCC)   • Nocturnal hypoxia   • Pharyngoesophageal dysphagia   • Urinary urgency   • Closed fracture of left upper extremity with routine healing   • Acute respiratory failure with hypoxia (HCC)   • Anxiety and depression   • Generalized weakness   • Aortic aneurysm, descending (HCC)   •  Pulmonary hypertension (HCC)       Assessment and Recommendations:    # Acute hypoxic respiratory failure  # pulmonary hypertension RVSP 45 mm of Hg  # pulmonary edema   # Self reported COPD  # Noctural Hypoxia/DOMINGO    Patient had acute worsening of respiratory function on 11/26/21 with CXR showing diffuse interstitial prominence consistent with pulmonary edema and a more focal consolidation the right lung base suggesting alveolar edema.initally increased O2 requirement which improved with a dose of IV lasix and now back to  3L.    - will recommend continuing Lasix  and re-evaluate for pulmonary edema. Echo done today shows EF of 60% with mild MR . RSVP of 45 mm of Hg - mild pulmonary hypertension present.  -Continue treatment for COPD exacerbation cannot be ruled out even though no official PFT's are available    for diagnosis. hyperextended lung fields on xray and she did have 30 pck yr history smoking  -  speech eval done today- no concern for aspiration as per chart review  - Continue steroids and antibiotics  - Duonebs as needed and Incentive spirometry Q1 hour while awake  - PT/OT, encourage ambulation  - Home Oxygen evaluation on discharge  -outpatient follow up with pulmonology in 6-8 weeks. Consider PFTs and sleep study      Pulmonology will sign off. Thank you for consulting us.    The patient was seen and plan discussed with my attending, Dr. Buenrostro.

## 2021-11-27 NOTE — DISCHARGE PLANNING
Agency/Facility Name: [facility]   Spoke To: Kimberly  Outcome: Per Kimberly patient only shows to be on night O2 and can not deliver any to discharge.     Agency/Facility Name: [facility]  Outcome: Voice mail left regarding referral. Requested a call back.

## 2021-11-27 NOTE — DISCHARGE PLANNING
Received Choice form at 1403  Agency/Facility Name: [facility]   Referral sent per Choice form @ 1403    @2452  Agency/Facility Name: [facility]  Spoke To: Phyllis   Outcome: Per Phyllis  Referral accepted. Phyllis will have o2 deliver in the next 1-2 hours

## 2021-11-27 NOTE — CARE PLAN
The patient is Watcher - Medium risk of patient condition declining or worsening    Shift Goals  Clinical Goals: Titrate O2, improve breathing, Safety  Patient Goals: Rest    Progress made toward(s) clinical / shift goals:    Problem: Knowledge Deficit - Standard  Goal: Patient and family/care givers will demonstrate understanding of plan of care, disease process/condition, diagnostic tests and medications  Outcome: Progressing     Problem: Fall Risk  Goal: Patient will remain free from falls  Outcome: Progressing     Problem: Hemodynamics  Goal: Patient's hemodynamics, fluid balance and neurologic status will be stable or improve  Outcome: Progressing     Problem: Respiratory  Goal: Patient will achieve/maintain optimum respiratory ventilation and gas exchange  Outcome: Progressing     Problem: Mobility  Goal: Patient's capacity to carry out activities will improve  Outcome: Progressing     Problem: Infection - Standard  Goal: Patient will remain free from infection  Outcome: Progressing     Problem: Knowledge Deficit - COPD  Goal: Patient/significant other demonstrates understanding of disease process, utilization of the Action Plan, medications and discharge instruction  Outcome: Progressing     Problem: Risk for Infection - COPD  Goal: Patient will remain free from signs and symptoms of infection  Outcome: Progressing     Problem: Nutrition - Advanced  Goal: Patient will display progressive weight gain toward goal have adequate food and fluid intake  Outcome: Progressing     Problem: Ineffective Airway Clearance  Goal: Patient will maintain patent airway with clear/clearing breath sounds  Outcome: Progressing     Problem: Impaired Gas Exchange  Goal: Patient will demonstrate improved ventilation and adequate oxygenation and participate in treatment regimen within the level of ability/situation.  Outcome: Progressing     Problem: Risk for Aspiration  Goal: Patient's risk for aspiration will be absent or  decrease  Outcome: Progressing     Problem: Self Care  Goal: Patient will have the ability to perform ADLs independently or with assistance (bathe, groom, dress, toilet and feed)  Outcome: Progressing       Patient is not progressing towards the following goals:

## 2021-11-27 NOTE — DISCHARGE INSTRUCTIONS
Discharge Instructions    Discharged to other by car with relative. Discharged via wheelchair, hospital escort: Yes.  Special equipment needed: Oxygen    Be sure to schedule a follow-up appointment with your primary care doctor or any specialists as instructed.     Discharge Plan:   Diet Plan: Discussed  Activity Level: Discussed  Confirmed Follow up Appointment: Patient to Call and Schedule Appointment  Confirmed Symptoms Management: Discussed  Medication Reconciliation Updated: Yes  Influenza Vaccine Indication: Patient Refuses    I understand that a diet low in cholesterol, fat, and sodium is recommended for good health. Unless I have been given specific instructions below for another diet, I accept this instruction as my diet prescription.   Other diet: regular    Special Instructions: None    · Is patient discharged on Warfarin / Coumadin?   No     Depression / Suicide Risk    As you are discharged from this [facility] facility, it is important to learn how to keep safe from harming yourself.    Recognize the warning signs:  · Abrupt changes in personality, positive or negative- including increase in energy   · Giving away possessions  · Change in eating patterns- significant weight changes-  positive or negative  · Change in sleeping patterns- unable to sleep or sleeping all the time   · Unwillingness or inability to communicate  · Depression  · Unusual sadness, discouragement and loneliness  · Talk of wanting to die  · Neglect of personal appearance   · Rebelliousness- reckless behavior  · Withdrawal from people/activities they love  · Confusion- inability to concentrate     If you or a loved one observes any of these behaviors or has concerns about self-harm, here's what you can do:  · Talk about it- your feelings and reasons for harming yourself  · Remove any means that you might use to hurt yourself (examples: pills, rope, extension cords, firearm)  · Get professional help from the community (Mental  Health, Substance Abuse, psychological counseling)  · Do not be alone:Call your Safe Contact- someone whom you trust who will be there for you.  · Call your local CRISIS HOTLINE 723-3717 or 853-909-4925  · Call your local Children's Mobile Crisis Response Team [place] (601) 473-2373 or www.b3 bio  · Call the toll free National Suicide Prevention Hotlines   · National Suicide Prevention Lifeline 352-459-PACI (3150)  · National Hope Line Network 800-SUICIDE (716-6447)

## 2021-11-27 NOTE — DISCHARGE PLANNING
Anticipated Discharge Disposition: Home to her son's house with home health and oxygen, already on service with [facility].     Action: This RN,  received a call from bedside RN, Valarie Lorenzo, asking if home health has accepted and if patient could have a portable tank delivered to bedside. This RN,  asked Rossana ZHENG to call [facility] asking if they accepted patient. Patient was on service with this agency in the past. [facility] may be closed on the weekends, referral was received per [facility] in [facility]. Patient is on service with [facility], I asked Rossana ZHENG to call [facility] and have a portable tank delivered to bedside. This RN,  called patient's son, asking if patient would be living with family or alone upon discharge, son stated patient will be living with him and his spouse, son will pick patient up from hospital once portable tank is delivered, I notified Valarie Lorenzo of the above information.     Barriers to Discharge: Delivery of portable oxygen tank.    Plan: HCM available if portable tank is not delivered, please contact me at #[phone], this RN,  will call the on call  for delivery of portable tank, please call son when patient is ready for discharge, he will pick patient up.     1400: Received a message from Katharine ZHENG at 1342 that patient only uses oxygen at home for night, a new order and face to face will need to be faxed to [facility], this RN,  notified bedside RN Marissa and Provider Kwasi Ray for orders, and [id number].     1413: Referral was faxed by MARCE Joshi, waiting for acceptance, and delivery of portable oxygen, need to call son when portable has been delivered for .     Awa García RN,

## 2021-11-27 NOTE — CARE PLAN
Problem: Knowledge Deficit - Standard  Goal: Patient and family/care givers will demonstrate understanding of plan of care, disease process/condition, diagnostic tests and medications  Outcome: Progressing     Problem: Fall Risk  Goal: Patient will remain free from falls  Outcome: Progressing     Problem: Hemodynamics  Goal: Patient's hemodynamics, fluid balance and neurologic status will be stable or improve  Outcome: Progressing     Problem: Respiratory  Goal: Patient will achieve/maintain optimum respiratory ventilation and gas exchange  Outcome: Progressing     Problem: Mobility  Goal: Patient's capacity to carry out activities will improve  Outcome: Progressing     Problem: Self Care  Goal: Patient will have the ability to perform ADLs independently or with assistance (bathe, groom, dress, toilet and feed)  Outcome: Progressing   The patient is stable    Shift Goals  Clinical Goals: Titrate O2, improve breathing, Safety  Patient Goals: Rest    Progress made toward(s) clinical / shift goals:  going home today    Patient is not progressing towards the following goals:None

## 2021-11-27 NOTE — FACE TO FACE
"Face to Face Note  -  Durable Medical Equipment    DOE Burton - NPI: [id number]  I certify that this patient is under my care and that they had a durable medical equipment(DME)face to face encounter by myself that meets the physician DME face-to-face encounter requirements with this patient on:    Date of encounter:   Patient:                    MRN:                       YOB: 2021  Thalia Horton  [id number]  2/8/1931     The encounter with the patient was in whole, or in part, for the following medical condition, which is the primary reason for durable medical equipment:  COPD    I certify that, based on my findings, the following durable medical equipment is medically necessary:  Oxygen.    HOME O2 Saturation Measurements:(Values must be present for Home Oxygen orders)  Room air sat at rest: 84  Room air sat with amb: 91  With liters of O2: 4, O2 sat at rest with O2: 94  With Liters of O2: 5, O2 sat with amb with O2 : 93  Is the patient mobile?: Yes    My Clinical findings support the need for the above equipment due to:  Hypoxia    Supporting Symptoms: The patient requires supplemental oxygen, as the following interventions have been tried with limited or no improvement: \"Bronchodilators and/or steroid inhalers, \"Positive expiratory pressure therapies, \"Oral and/or IV steroids, \"Ambulation with oximetry and \"Incentive spirometry    If patient feels more short of breath, they can go up to 6 liters per minute and contact healthcare provider.  "

## 2021-11-28 LAB
BACTERIA BLD CULT: NORMAL
BACTERIA BLD CULT: NORMAL
SIGNIFICANT IND 70042: NORMAL
SIGNIFICANT IND 70042: NORMAL
SITE SITE: NORMAL
SITE SITE: NORMAL
SOURCE SOURCE: NORMAL
SOURCE SOURCE: NORMAL

## 2021-11-28 NOTE — DISCHARGE PLANNING
Agency/Facility Name: [facility]  Spoke To: Donna answering service  Outcome: Will contact oncall  to call this DPA back.     @1700  DPA spoke to Bull, will deliver tank within an hour to bedside.

## 2021-11-29 ENCOUNTER — OFFICE VISIT (OUTPATIENT)
Dept: MEDICAL GROUP | Facility: PHYSICIAN GROUP | Age: 86
End: 2021-11-29
Payer: MEDICARE

## 2021-11-29 VITALS
WEIGHT: 137.8 LBS | BODY MASS INDEX: 23.52 KG/M2 | TEMPERATURE: 98.6 F | HEART RATE: 88 BPM | HEIGHT: 64 IN | OXYGEN SATURATION: 99 % | SYSTOLIC BLOOD PRESSURE: 100 MMHG | DIASTOLIC BLOOD PRESSURE: 56 MMHG

## 2021-11-29 DIAGNOSIS — R53.81 DEBILITY: ICD-10-CM

## 2021-11-29 DIAGNOSIS — I10 ESSENTIAL HYPERTENSION: ICD-10-CM

## 2021-11-29 DIAGNOSIS — I27.20 PULMONARY HYPERTENSION (HCC): ICD-10-CM

## 2021-11-29 DIAGNOSIS — J96.01 ACUTE RESPIRATORY FAILURE WITH HYPOXIA (HCC): ICD-10-CM

## 2021-11-29 DIAGNOSIS — G31.84 MILD COGNITIVE IMPAIRMENT: ICD-10-CM

## 2021-11-29 DIAGNOSIS — R13.14 PHARYNGOESOPHAGEAL DYSPHAGIA: ICD-10-CM

## 2021-11-29 DIAGNOSIS — R39.15 URINARY URGENCY: ICD-10-CM

## 2021-11-29 PROCEDURE — 99215 OFFICE O/P EST HI 40 MIN: CPT | Performed by: NURSE PRACTITIONER

## 2021-11-29 ASSESSMENT — FIBROSIS 4 INDEX: FIB4 SCORE: 1.84

## 2021-11-29 NOTE — Clinical Note
Thalia has an appointment with you tomorrow.  She is currently on Lasix 20 mg daily and was previously taking metoprolol 12.5 mg daily.  Devious to her hospitalization she was having very low blood pressures and we had decreased the metoprolol she continues to have very low blood pressures in the 80s to 90s over 50s accompanied by dizziness as such we decided to discontinue her metoprolol completely.  She is very high risk for fall. I did discuss with them that you may decide to adjust the medications differently, dependent on your assessment.     Thank you,    DEJA Govea

## 2021-11-29 NOTE — PROGRESS NOTES
Subjective:     Chief Complaint   Patient presents with   • Follow-Up     Heart failure, aortic annurisym   • Hypertension     this morning 165/41 requesting respitory theripist          HPI:   Thalia presents today with     Problem   Pulmonary Hypertension (Hcc)    Noted during hospitalization.  Started on Lasix 20 mg p.o. daily for this issue.  Denies any difficulty with breathing, does have a mild cough and known aspiration.     Debility    Patient has progressive decrease in independence, ability to manage her own affairs.  Patient is a very high fall risk and requires assistance to the bathroom, assistance to ambulate.  she has been having difficulties with her blood pressure, increased oxygen need and as such requires around-the-clock care.     Acute Respiratory Failure With Hypoxia (Hcc)    Continued.  Patient was discharged from the hospital after aspiration pneumonia.  Currently on oxygen 2 L continuously O2 saturation is 99% in our office.  Home health is following this.     Urinary Urgency    On further discussion with family they state they believe that the urinary urgency is only present when patient defers asking for assistance to the restroom and holds it.  We discussed scheduling bathroom breaks, drinking plenty of fluids throughout the day but limiting fluids 2 hours before bedtime.  Discussed going to bathroom immediately before bed.  Discussed that getting up once or twice at night to go to the bathroom was normal and that it would be important to contact her family for assistance related to fall risk.     Pharyngoesophageal Dysphagia    Continued, worsened issue.  Patient requires reevaluation with speech therapy as she may be aspirating more than was noted with previous assessment.  Patient had an episode of choking where she is having difficulty breathing and EMTs were called prior to her hospitalization for the aspiration pneumonia.  Patient and family that we may need to make adjustments in  what she is eating for safety.     Essential Hypertension    Hypertension has been a chronic issue over multiple years.  More recently we have been backing off on medications as her blood pressure has been low.  Hospitalist during her admission declined to decrease metoprolol and added Lasix 20 mg p.o. daily related to mild pulmonary hypertension and heart enlargement.  Patient's blood pressure today is 100/56 and she has been feeling dizzy intermittently, discussed with family that the addition of Lasix is likely also lowering her blood pressure.  Considering low blood pressures and dizziness on standing and increasing her already high risk for fall we will discontinue metoprolol until she sees cardiology.     Mild Cognitive Impairment    Chronic in nature.  Overall stable.  Patient has been having increasing difficulty remembering to do tasks within the home and is very high risk for fall at this time.  Patient does need daily assistance.  States that she has difficulty remembering to eat and take her medications.         Current Outpatient Medications Ordered in Epic   Medication Sig Dispense Refill   • furosemide (LASIX) 20 MG Tab Take 1 Tablet by mouth every day for 30 days. 30 Tablet 0   • Fluticasone Furoate-Vilanterol (BREO ELLIPTA) 100-25 MCG/INH AEROSOL POWDER, BREATH ACTIVATED Inhale 1 Puff every day. 60 Each 3   • aspirin EC (ECOTRIN) 81 MG Tablet Delayed Response Take 81 mg by mouth every day.     • multivitamin (THERAGRAN) Tab Take 1 Tablet by mouth every day.     • pantoprazole (PROTONIX) 40 MG Tablet Delayed Response Take 40 mg by mouth every day.     • atorvastatin (LIPITOR) 20 MG Tab TAKE 1 TABLET BY MOUTH EVERY DAY FOR CHOLESTEROL 90 Tablet 3   • escitalopram (LEXAPRO) 10 MG Tab TAKE 1 TABLET BY MOUTH EVERY DAY 90 Tablet 3   • levothyroxine (SYNTHROID) 25 MCG Tab TAKE 1 TABLET BY MOUTH EVERY MORNING ON AN EMPTY STOMACH FOR THYROID 90 Tablet 3   • fluticasone (FLONASE) 50 MCG/ACT nasal spray  "ADMINISTER 1 SPRAY INTO AFFECTED NOSTRIL(S) EVERY DAY 16 g 0   • ezetimibe (ZETIA) 10 MG Tab TAKE 1 TABLET BY MOUTH EVERY DAY FOR CHOLESTEROL/ STROKE PREVENTION 90 tablet 3   • Cholecalciferol (VITAMIN D3) 2000 UNIT Cap Take 2,000 Units by mouth every day.     • Cyanocobalamin (VITAMIN B-12) 1000 MCG Tab Take 1,000 mcg by mouth every day.     • albuterol 108 (90 Base) MCG/ACT Aero Soln inhalation aerosol INHALE 2 PUFFS BY MOUTH EVERY 6 HOURS AS NEEDED FOR SHORTNESS OF BREATH 1 Inhaler 6     No current [facility]-ordered facility-administered medications on file.       Health Maintenance: Discuss at follow-up    ROS:  Gen: no fevers/chills, no changes in weight  Eyes: no changes in vision  ENT: no sore throat, no hearing loss, no bloody nose  Pulm: no sob, no cough  CV: no chest pain, no palpitations  GI: no nausea/vomiting, no diarrhea  : no dysuria  MSk: no myalgias  Skin: no rash  Neuro: no headaches, no numbness/tingling  Heme/Lymph: no easy bruising      Objective:     Exam:  /56 (BP Location: Left arm, Patient Position: Sitting, BP Cuff Size: Adult)   Pulse 88   Temp 37 °C (98.6 °F) (Temporal)   Ht 1.626 m (5' 4\")   Wt 62.5 kg (137 lb 12.8 oz)   LMP  (LMP Unknown)   SpO2 99%   BMI 23.65 kg/m²  Body mass index is 23.65 kg/m².    Gen: Alert and oriented, No apparent distress.  Neck: Neck is supple without lymphadenopathy.  Lungs: Normal effort, crackles at bases, no wheezes, rhonchi, or rales  CV: Regular rate and rhythm. No murmurs, rubs, or gallops.  Ext: No clubbing, cyanosis, edema.      Assessment & Plan:     89 y.o. female with the following -     Problem List Items Addressed This Visit     Acute respiratory failure with hypoxia (HCC)     -Continue oxygen at this time         Debility     -Patient is not appropriate to live in assisted living  -requires around-the-clock care for increasing needs.         Essential hypertension     -Continue Lasix, discussed addition of potassium, decreasing Lasix " dose with cardiology  -Discontinue metoprolol, discussed medication changes with cardiology.         Mild cognitive impairment     -Memory is stable  -declines neurology  -Patient is not safe to live in an assisted living facility she does require around-the-clock assistance and care           Pharyngoesophageal dysphagia     -Recommend reevaluation by speech therapy         Pulmonary hypertension (HCC)     -follow-up with cardiology for management  -Recommend home respiratory therapy as needed         Urinary urgency     -Continue lifestyle changes, depends as needed.               I spent a total of 55 minutes with record review, exam, communication with the patient, communication with other providers, and documentation of this encounter.      Return in about 1 month (around 12/29/2021), or if symptoms worsen or fail to improve, for fall risk.    Please note that this dictation was created using voice recognition software. I have made every reasonable attempt to correct obvious errors, but I expect that there are errors of grammar and possibly content that I did not discover before finalizing the note.      Face to Face Note  -  Durable Medical Equipment    Marcelo Isaacs, ASIM.P.R.N. - NPI: [id number]  I certify that this patient is under my care and that they had a durable medical equipment(DME)face to face encounter by myself that meets the physician DME face-to-face encounter requirements with this patient on:    Date of encounter:   Patient:                    MRN:                       YOB: 2021  Thalia Weems Sol  [id number]  2/8/1931     The encounter with the patient was in whole, or in part, for the following medical condition, which is the primary reason for durable medical equipment:  Other - Low blood pressure, change in mental status, debility and difficulty with mobility, need for more assistance to get out of bed    I certify that, based on my findings, the  following durable medical equipment is medically necessary:  Other DME Equipment - Hospital bed.    Hospital bed would allow patient more ease in getting out of bed and increase family's ability to help care for her    My Clinical findings support the need for the above equipment due to:  Abnormal Gait, Frequent Falls, Mental Status, Other - Occultly with mobility, trouble getting up and out of bed    Supporting Symptoms: She requires hospital bed to allow her ease of exiting her bed, assistance with sitting and getting up to get to the toilet.

## 2021-11-29 NOTE — ASSESSMENT & PLAN NOTE
-Patient is not appropriate to live in assisted living  -requires around-the-clock care for increasing needs.

## 2021-11-29 NOTE — ASSESSMENT & PLAN NOTE
-Continue Lasix, discussed addition of potassium, decreasing Lasix dose with cardiology  -Discontinue metoprolol, discussed medication changes with cardiology.

## 2021-11-29 NOTE — ASSESSMENT & PLAN NOTE
-Memory is stable  -declines neurology  -Patient is not safe to live in an assisted living facility she does require around-the-clock assistance and care

## 2021-11-30 ENCOUNTER — PATIENT OUTREACH (OUTPATIENT)
Dept: HEALTH INFORMATION MANAGEMENT | Facility: OTHER | Age: 86
End: 2021-11-30

## 2021-11-30 ENCOUNTER — TELEPHONE (OUTPATIENT)
Dept: MEDICAL GROUP | Facility: PHYSICIAN GROUP | Age: 86
End: 2021-11-30

## 2021-11-30 NOTE — PROGRESS NOTES
Community Health Worker Intake  • Social determinates of health intake : Complete  • Identified barriers to : None  • Contact information provided to Thalia Horton: Yes   • Has PCP appointment scheduled for: 12/1 Cardiology  • Scheduled Food Delivery/Home Visit/Outpatient Visit:No  • Outpatient assessment completed    CHW Conor called and spoke with patient' son Say ( HIPPA approved) and introduced Community Care Management . Patient will be receiving in home care with [facility]. Patient relies on family for all transportation needs. Bill states patient has no current needs at this time.    Plan:   CHW Conor discussed all up coming appointments date and times and provided all contact information. CHW will discharge patient from [facility] and remove from case load and master list as patient has no needs and will be followed by [facility].

## 2021-11-30 NOTE — TELEPHONE ENCOUNTER
There was no indication of sign of infection based on her visit yesterday, she did mention congestion that has been chronic for her but did not mention that it had changed.  She did not have fever and did not indicate any sinus pain or pressure. Considering the presence of bright red blood it would be necessary to examine her nasal passageways and make sure that that is the origin of the bleeding. Based on visit yesterday there is no indication to treat sinusitis.

## 2021-11-30 NOTE — TELEPHONE ENCOUNTER
----- Message from Bobbi Oro R.N. sent at 11/30/2021  1:16 PM PST -----  Regarding: Request for Antibiotics  Marcelo,    I just received a phone call from Thalia's son Freddie, he is requesting an antibiotic for his mother.  As you will recall, the patient & family saw you yesterday.  Thalia now has lots of mucous that is brownish/green in color with bright red blood (Freddie could not tell me how much blood).  She is blowing her nose a lot.  Freddie's wife called the home healthcare service and they said it sounded like Thalia has a sinus infection and needs an antibiotic, they recommended Freddie reach out to you for same.      Your thoughts?      Charline

## 2021-11-30 NOTE — TELEPHONE ENCOUNTER
Patient's son Freddie is requesting an antibiotic for his mother.  Patient & family saw PCP yesterday, 11/29/21.  Thalia now has lots of mucous that is brownish/green in color with bright red blood (Freddie could not tell me how much blood).  She is blowing her nose a lot.  Freddie's wife called the home healthcare service and they said it sounded like Thalia has a sinus infection and needs an antibiotic, they recommended Freddie reach out to you for same.

## 2021-11-30 NOTE — TELEPHONE ENCOUNTER
Based on this information would need an appointment to discuss if an antibiotic is warranted.  She was treated with antibiotics in the hospital and generally a sinus infection would not be treated till at least 10 days after symptom onset.  Considering that there was blood in the sputum we would need to evaluate if this was coming from her nose.

## 2021-11-30 NOTE — TELEPHONE ENCOUNTER
Phone Number Called: [phone]    Call outcome: Spoke to patient regarding message below.    Message: Spoke to pt daughter in law, PT daughter was upset with the response given. Pt did not want to wait till December 2nd. Pt daughter in law will be calling [facility] and seeing what they recommend, I recommended  ER or Urgent Care, pt daughter in law did not like that option.

## 2021-12-01 ENCOUNTER — OFFICE VISIT (OUTPATIENT)
Dept: CARDIOLOGY | Facility: MEDICAL CENTER | Age: 86
End: 2021-12-01
Payer: MEDICARE

## 2021-12-01 VITALS
HEART RATE: 84 BPM | BODY MASS INDEX: 23.39 KG/M2 | DIASTOLIC BLOOD PRESSURE: 56 MMHG | SYSTOLIC BLOOD PRESSURE: 98 MMHG | HEIGHT: 64 IN | OXYGEN SATURATION: 100 % | WEIGHT: 137 LBS

## 2021-12-01 DIAGNOSIS — Z66 DNR (DO NOT RESUSCITATE): ICD-10-CM

## 2021-12-01 DIAGNOSIS — I10 ESSENTIAL HYPERTENSION: ICD-10-CM

## 2021-12-01 DIAGNOSIS — I27.20 PULMONARY HYPERTENSION (HCC): ICD-10-CM

## 2021-12-01 DIAGNOSIS — R55 NEAR SYNCOPE: ICD-10-CM

## 2021-12-01 DIAGNOSIS — J44.1 CHRONIC OBSTRUCTIVE PULMONARY DISEASE WITH ACUTE EXACERBATION (HCC): ICD-10-CM

## 2021-12-01 DIAGNOSIS — G31.84 MILD COGNITIVE IMPAIRMENT: ICD-10-CM

## 2021-12-01 DIAGNOSIS — I65.23 ATHEROSCLEROSIS OF BOTH CAROTID ARTERIES: ICD-10-CM

## 2021-12-01 PROCEDURE — 99214 OFFICE O/P EST MOD 30 MIN: CPT | Performed by: INTERNAL MEDICINE

## 2021-12-01 ASSESSMENT — FIBROSIS 4 INDEX: FIB4 SCORE: 1.84

## 2021-12-01 NOTE — PROGRESS NOTES
Chief Complaint   Patient presents with   • Transient Ischemic Attack     follow up       Subjective:   Thalia Horton is a 89 y.o. female who presents today for follow-up regarding PAD.  Has a history of hypertension hyperlipidemia and bilateral carotid artery stenosis which has progressed to 70% on one side and 50-69% on the other.  She developed TIA symptoms characterized by word finding difficulty and expressive aphasia.  This has resolved.  She was taking aspirin at the time.  She is now followed by vascular surgery.  Has had monitor revealing nonsustained VT and had her beta-blocker restarted by our APRN.      Since last visit she sustained a fall and broke her wrist and is now battling orthostatic hypotension despite discontinuation of her beta-blocker.  She was also admitted for COPD exacerbation without heart failure but was placed on Lasix.  She now appears dehydrated and this is exacerbating her orthostasis.  She feels weak.    Past Medical History:   Diagnosis Date   • Chickenpox    • COPD (chronic obstructive pulmonary disease) (HCC)    • GERD (gastroesophageal reflux disease)    • Citizen of the [place] measles    • Heart palpitations    • Hyperlipidemia    • Osteoarthritis    • Thyroid disease      Past Surgical History:   Procedure Laterality Date   • HIP REPLACEMENT, TOTAL Left      Family History   Problem Relation Age of Onset   • Stroke Mother 69     Social History     Socioeconomic History   • Marital status:      Spouse name: Not on file   • Number of children: Not on file   • Years of education: Not on file   • Highest education level: Not on file   Occupational History   • Not on file   Tobacco Use   • Smoking status: Former Smoker     Packs/day: 1.00     Years: 55.00     Pack years: 55.00     Types: Cigarettes     Quit date: 2013     Years since quittin.3   • Smokeless tobacco: Never Used   Vaping Use   • Vaping Use: Never used   Substance and Sexual Activity   • Alcohol use: Yes      Alcohol/week: 6.0 oz     Types: 10 Glasses of wine per week     Comment: 1 -2    • Drug use: No   • Sexual activity: Never   Other Topics Concern   • Not on file   Social History Narrative   • Not on file     Social Determinants of Health     Financial Resource Strain:    • Difficulty of Paying Living Expenses: Not on file   Food Insecurity:    • Worried About Running Out of Food in the Last Year: Not on file   • Ran Out of Food in the Last Year: Not on file   Transportation Needs:    • Lack of Transportation (Medical): Not on file   • Lack of Transportation (Non-Medical): Not on file   Physical Activity:    • Days of Exercise per Week: Not on file   • Minutes of Exercise per Session: Not on file   Stress:    • Feeling of Stress : Not on file   Social Connections:    • Frequency of Communication with Friends and Family: Not on file   • Frequency of Social Gatherings with Friends and Family: Not on file   • Attends [employer] Services: Not on file   • Active Member of Clubs or Organizations: Not on file   • Attends Club or Organization Meetings: Not on file   • Marital Status: Not on file   Intimate Partner Violence:    • Fear of Current or Ex-Partner: Not on file   • Emotionally Abused: Not on file   • Physically Abused: Not on file   • Sexually Abused: Not on file   Housing Stability:    • Unable to Pay for Housing in the Last Year: Not on file   • Number of Places Lived in the Last Year: Not on file   • Unstable Housing in the Last Year: Not on file     Allergies   Allergen Reactions   • Penicillins Vomiting   • Penicillin G Vomiting     Outpatient Encounter Medications as of 12/1/2021   Medication Sig Dispense Refill   • furosemide (LASIX) 20 MG Tab Take 1 Tablet by mouth every day for 30 days. 30 Tablet 0   • Fluticasone Furoate-Vilanterol (BREO ELLIPTA) 100-25 MCG/INH AEROSOL POWDER, BREATH ACTIVATED Inhale 1 Puff every day. 60 Each 3   • aspirin EC (ECOTRIN) 81 MG Tablet Delayed Response Take 81 mg by mouth  "every day.     • multivitamin (THERAGRAN) Tab Take 1 Tablet by mouth every day.     • pantoprazole (PROTONIX) 40 MG Tablet Delayed Response Take 40 mg by mouth every day.     • atorvastatin (LIPITOR) 20 MG Tab TAKE 1 TABLET BY MOUTH EVERY DAY FOR CHOLESTEROL 90 Tablet 3   • escitalopram (LEXAPRO) 10 MG Tab TAKE 1 TABLET BY MOUTH EVERY DAY 90 Tablet 3   • levothyroxine (SYNTHROID) 25 MCG Tab TAKE 1 TABLET BY MOUTH EVERY MORNING ON AN EMPTY STOMACH FOR THYROID 90 Tablet 3   • fluticasone (FLONASE) 50 MCG/ACT nasal spray ADMINISTER 1 SPRAY INTO AFFECTED NOSTRIL(S) EVERY DAY 16 g 0   • ezetimibe (ZETIA) 10 MG Tab TAKE 1 TABLET BY MOUTH EVERY DAY FOR CHOLESTEROL/ STROKE PREVENTION 90 tablet 3   • Cholecalciferol (VITAMIN D3) 2000 UNIT Cap Take 2,000 Units by mouth every day.     • Cyanocobalamin (VITAMIN B-12) 1000 MCG Tab Take 1,000 mcg by mouth every day.     • albuterol 108 (90 Base) MCG/ACT Aero Soln inhalation aerosol INHALE 2 PUFFS BY MOUTH EVERY 6 HOURS AS NEEDED FOR SHORTNESS OF BREATH 1 Inhaler 6     No facility-administered encounter medications on file as of 12/1/2021.     Review of Systems   All other systems reviewed and are negative.       Objective:   BP (!) 98/56 (BP Location: Right arm, Patient Position: Sitting)   Pulse 84   Ht 1.626 m (5' 4\")   Wt 62.1 kg (137 lb)   LMP  (LMP Unknown)   SpO2 100%   BMI 23.52 kg/m²     Physical Exam  Vitals reviewed.   Constitutional:       General: She is not in acute distress.     Appearance: She is well-developed. She is not diaphoretic.      Comments: Frail and elderly  Wheelchair   HENT:      Head: Normocephalic and atraumatic.      Right Ear: External ear normal.      Left Ear: External ear normal.      Mouth/Throat:      Pharynx: No oropharyngeal exudate.   Eyes:      General: No scleral icterus.        Right eye: No discharge.         Left eye: No discharge.      Conjunctiva/sclera: Conjunctivae normal.      Pupils: Pupils are equal, round, and reactive to " light.   Neck:      Thyroid: No thyromegaly.      Vascular: No JVD.      Trachea: No tracheal deviation.   Cardiovascular:      Rate and Rhythm: Normal rate and regular rhythm.      Chest Wall: PMI is not displaced.      Pulses:           Carotid pulses are 2+ on the right side and 2+ on the left side.       Radial pulses are 2+ on the right side and 2+ on the left side.        Popliteal pulses are 2+ on the right side and 2+ on the left side.        Dorsalis pedis pulses are 2+ on the right side and 2+ on the left side.        Posterior tibial pulses are 2+ on the right side and 2+ on the left side.      Heart sounds: S1 normal and S2 normal. Murmur heard.   No friction rub. No gallop. No S3 or S4 sounds.    Pulmonary:      Effort: Pulmonary effort is normal. No respiratory distress.      Breath sounds: Normal breath sounds. No wheezing or rales.   Chest:      Chest wall: No tenderness.   Abdominal:      General: Bowel sounds are normal. There is no distension.      Palpations: Abdomen is soft.      Tenderness: There is no abdominal tenderness.   Musculoskeletal:         General: No tenderness. Normal range of motion.      Cervical back: Normal range of motion and neck supple.   Skin:     General: Skin is warm and dry.      Findings: No erythema or rash.   Neurological:      Mental Status: She is alert and oriented to person, place, and time.      Cranial Nerves: No cranial nerve deficit (Cranial nerves II through XII grossly intact).   Psychiatric:         Behavior: Behavior normal.         Thought Content: Thought content normal.         Judgment: Judgment normal.       LABS:  Lab Results   Component Value Date/Time    CHOLSTRLTOT 128 02/18/2021 10:27 AM    LDL 56 02/18/2021 10:27 AM    HDL 44 02/18/2021 10:27 AM    TRIGLYCERIDE 138 02/18/2021 10:27 AM       Lab Results   Component Value Date/Time    WBC 10.4 11/27/2021 12:38 AM    RBC 4.12 (L) 11/27/2021 12:38 AM    HEMOGLOBIN 13.0 11/27/2021 12:38 AM     HEMATOCRIT 40.0 11/27/2021 12:38 AM    MCV 97.1 11/27/2021 12:38 AM    NEUTSPOLYS 79.40 (H) 11/27/2021 12:38 AM    LYMPHOCYTES 11.40 (L) 11/27/2021 12:38 AM    MONOCYTES 8.70 11/27/2021 12:38 AM    EOSINOPHILS 0.00 11/27/2021 12:38 AM    BASOPHILS 0.10 11/27/2021 12:38 AM     Lab Results   Component Value Date/Time    SODIUM 137 11/27/2021 12:38 AM    POTASSIUM 3.5 (L) 11/27/2021 12:38 AM    CHLORIDE 97 11/27/2021 12:38 AM    CO2 30 11/27/2021 12:38 AM    GLUCOSE 147 (H) 11/27/2021 12:38 AM    BUN 21 11/27/2021 12:38 AM    CREATININE 0.53 11/27/2021 12:38 AM     Lab Results   Component Value Date    HBA1C 5.8 (H) 04/13/2021      Lab Results   Component Value Date/Time    ALKPHOSPHAT 59 11/26/2021 09:32 AM    ASTSGOT 16 11/26/2021 09:32 AM    ALTSGPT 15 11/26/2021 09:32 AM    TBILIRUBIN 0.6 11/26/2021 09:32 AM      No results found for: BNPBTYPENAT   No results found for: TSH  No results found for: PROTHROMBTM, INR     ECHO CONCLUSIONS (11/26/2021):  Mild concentric left ventricular hypertrophy. Normal left ventricular   size, systolic, and diastolic function.  Moderately dilated left atrium.  Mild mitral valve regurgitation.  Mild tricuspid regurgitation.  Estimated right ventricular systolic pressure is 45 mmHg; mild   pulmonary hypertension.   No pericardial effusion.     Compared to prior echo 03/19/21, mitral valve regurgitation appears   mild.    EKG (1/22/2019):  I have personally reviewed the EKG this visit and discussed with the patient.  Sinus rhythm, left axis deviation, poor R wave progression.    ECHO CONCLUSIONS (5/15/2020):  Normal left ventricular systolic function.  Left ventricular ejection fraction is visually estimated to be 60%.  Indeterminate diastolic function.  The right ventricle was not well visualized but appeared to be normal   in size and function.  Mild to moderate mitral regurgitation.  Moderate mitral annular calcification.  Aortic sclerosis without stenosis.  Normal estimated right  atrial pressure.   Unable to estimate pulmonary artery pressure due to an inadequate   tricuspid regurgitant jet.     No prior study is available for comparison.       Assessment:     1. Near syncope     2. Pulmonary hypertension (HCC)     3. DNR (do not resuscitate)     4. Atherosclerosis of both carotid arteries     5. Chronic obstructive pulmonary disease with acute exacerbation (HCC)     6. Essential hypertension     7. Mild cognitive impairment         Medical Decision Making:  Today's Assessment / Status / Plan:     Recent hospitalizations have left her weak and debilitated.  We discussed the prolonged convalescence that occurs after eating brief hospital stays in her age group.  She also now is orthostatic and appears dehydrated.  She has no evidence of systolic or diastolic heart failure pulmonary hypertension or other cardiac issues that would require Lasix therefore I recommend discontinuing it.  If orthostasis is not improved with this and compression garments and I recommend consideration of either Florinef or midodrine or droxidopa initiation for symptomatic management.  She remains DNR/DNI.  Continue other medical therapy.  Follow-up in 3 months.

## 2021-12-02 ENCOUNTER — TELEPHONE (OUTPATIENT)
Dept: MEDICAL GROUP | Facility: PHYSICIAN GROUP | Age: 86
End: 2021-12-02

## 2021-12-02 DIAGNOSIS — G47.34 NOCTURNAL HYPOXIA: ICD-10-CM

## 2021-12-02 DIAGNOSIS — J44.1 CHRONIC OBSTRUCTIVE PULMONARY DISEASE WITH ACUTE EXACERBATION (HCC): ICD-10-CM

## 2021-12-02 NOTE — TELEPHONE ENCOUNTER
"VOICEMAIL  1. Caller Name: Adriana [facility]                    Call Back Number: [phone]    2. Message: Adriana left a message stating pt had been seen at [facility], pt was put on oxygen. Pt was orthostatic of 108/62BP and 88/50 standing BP. Pt is asymptomatic. Adriana stated after today's assessment pt \"looked pretty good\" Adriana states pt might have a dry nose and might need humidification.      3. Patient approves office to leave a detailed voicemail/MyChart message: N\A    "

## 2021-12-03 NOTE — TELEPHONE ENCOUNTER
"VOICEMAIL  1. Caller Name: Linda                      Call Back Number: [phone]    2. Message: Linda contacted Thalia's O2 PCP, they need an our from our office to order the Humidification. O2 PCP stated they need it to state \"Humidifier bottle for oxygen\" Then fax it over to them at [phone]    3. Patient approves office to leave a detailed voicemail/MyChart message: N\A    "

## 2021-12-06 ENCOUNTER — TELEPHONE (OUTPATIENT)
Dept: MEDICAL GROUP | Facility: PHYSICIAN GROUP | Age: 86
End: 2021-12-06

## 2021-12-06 NOTE — TELEPHONE ENCOUNTER
"Phone Number Called: [phone]    Call outcome: Spoke to patient regarding message below.    Message: Spoke to pt daughter in law, first attempted to schedule an appointment with Marcelo, Marcelo is however booked until the 01/01/2022. Pt daughter in law was not happy with that. I did offer an appointment with Dr. Bowden the daughter in law asked what BP is \"too low\" I stated it was out of my scope of practice to answer that question and I would ask a doctor available. Dr. Cheek stated that \"we should not make an appointment, Thalia needs to go to the ER, as if Thalia came here we would be calling 911 anyway's as too low is 80 and she is at 64/43.\" Pt daughter in law was upset, wrote my name down and told me I would be sued.   "

## 2021-12-06 NOTE — TELEPHONE ENCOUNTER
VOICEMAIL  1. Caller Name:  Paras Méndez                     Call Back Number: [phone] (home)     2. Message: Spoke to daughter in law at length, informed her, PCP is not in office, and other providers in office would send Pt to ER if they came in.  Informed her we understood this has been an ongoing issue but with the current standing readings she needs to be seen Immediately by the ER.  Informed test may need to be run, or an IV may need to be placed (as she stated the Pt may be dehydrated because she is worried about being incontinent, and was not drinking enough fluids).  Daughter in law asked if it was possible a UC could handle vs ER, informed her no, this was more emergent and UC is ill equipped to handle.  Daughter in law verbalized understanding, and said she would try to get the Pt to go.

## 2021-12-06 NOTE — TELEPHONE ENCOUNTER
VOICEMAIL  1. Caller Name: Nicolasa                      Call Back Number: [phone]    2. Message: Nicolasa called from [facility] stating Thalia was orthostatic this morning with a BP of 64/43, pt family wants to know when they can be seen for this.     3. Patient approves office to leave a detailed voicemail/MyChart message: N\A

## 2021-12-07 NOTE — TELEPHONE ENCOUNTER
Thank you Homa,     I called and spoke with patients Daughter-in-Law Paras after listening to the voicemail. Paras is upset and would like to have the previous note from Daysi deleted as she states she did not say anything about suing. I informed her that I was sitting next to Daysi last week when there was a conversation in regards to getting antibiotics and there was mention of negligence and suing. Paras is adamant that she never said this to Daysi (today or last week) and that these are false accusations. I apologized to Paras and informed her that I will work on pulling the calls to listen for myself on what happened. - I will follow up in regards to this once I have received the recording.     In addition, Patients BP is still significantly low. I spoke with Paras again in regards to Romero BP reading and informed her that the previous information given was correct that it is best she go to the ER with such low readings. Also informed her that at her pcp office, if she is seen with such low readings, the provider will sent her to the ER via ambulance which is why it was recommended that they go today. Thalia unfortunately is refusing to go to the ER at this time and they are still wanting to follow up with PCP. Marcelo is out of the office today and is booked the rest of the week upon return. I scheduled Thalia to meet with  for a follow up in the mean time but reiterated that she may be sent to ER if BP is still low at the time of visit. Paras Verbalized understanding and will be accompanying Thalia to appt on Wednesday.

## 2021-12-07 NOTE — TELEPHONE ENCOUNTER
Yes, if [facility] doesn't do hospital beds, then find out their preference for a DM company who does.

## 2021-12-07 NOTE — TELEPHONE ENCOUNTER
Phone Number Called: [phone] (home)     Call outcome: Left detailed message for patient. Informed to call back with any additional questions.    Message: LVM for Pt to cb and inform of other supplier they would like to use, or if they would like our office to pick on for them.

## 2021-12-07 NOTE — TELEPHONE ENCOUNTER
Phone Number Called: [phone] (home)     Call outcome:  Spoke to son Freddie Méndez    Message: Informed Pt is using [facility] as their DME supplier, but they are not sure if they supply beds.  Went to use the form to order, but the form is only for oxygen, will call [facility] when they open up to see if they also supply beds.  If not do you want me to cb and ask which of the other companies they can use they would prefer we sent the order to?

## 2021-12-07 NOTE — TELEPHONE ENCOUNTER
Since they need all of that information, this will have to wait until tomorrow for Marcelo's return.    In the meantime, you could contact the family to see if they want us to try another company since [facility] doesn't know when a new bed would come in.

## 2021-12-07 NOTE — TELEPHONE ENCOUNTER
Marcelo,       Patient is in need of a hospital bed. Can you please place the order for Hospital bed to be arranged for patient.     Please advise.     Thank you,     Johanne Chou  Medical Assistant

## 2021-12-07 NOTE — TELEPHONE ENCOUNTER
Phone Number Called: [phone]    Call outcome:  Spoke with [facility] Representative    Message: They do supply beds, but they do not know how long it will be until they can get one in.  They said we could send the order in (on our Rx paper, they don't have a form for this), with the ICD-10 code, the length (duration) of need, with Progress notes, INS info and Demographics, then they will contact us with an estimate on the wait time til they can get it for the Pt.  Do you wish to proceed, or call and if they want us to use another DME provider?

## 2021-12-07 NOTE — TELEPHONE ENCOUNTER
"VOICEMAIL  1. Caller Name: Paras Méndez                      Call Back Number: [phone]    2. Message: Daughter in law, she was reviewing [facility] and she had this to say:    \"I spoke with Daysi earlier today, and in her last comments was that I threatened to андрей her.  I have a real problem with this, there was no indication of anything remotely, remotely indicating that I would андрей.  I am perplexed and terribly, terribly disappointed.  I had a follow up call with Homa as well and we're not seeing that in [facility].  I want a phone call back, please this needs to be rectified and I would like a call back because this needs to be rectified and would like to file a formal complaint against Daysi because this is erroneous and inaccurate.\"        "

## 2021-12-08 ENCOUNTER — APPOINTMENT (OUTPATIENT)
Dept: MEDICAL GROUP | Facility: PHYSICIAN GROUP | Age: 86
End: 2021-12-08
Payer: MEDICARE

## 2021-12-08 NOTE — TELEPHONE ENCOUNTER
VOICEMAIL  1. Caller Name: Ash Ménedz                      Call Back Number: [phone]    2. Message: Daughter-in-law called back, asked for a cb.

## 2021-12-08 NOTE — TELEPHONE ENCOUNTER
Phone Number Called: [phone]    Call outcome: Spoke with Daughter-in-law Paras    Message: Paras stated they did not have any preference on the DME company, but they would like to have our office follow up with them so they have an estimate on the time it will take to receive the bed.  Asked for a call when we have the information.

## 2021-12-10 ENCOUNTER — TELEPHONE (OUTPATIENT)
Dept: MEDICAL GROUP | Facility: PHYSICIAN GROUP | Age: 86
End: 2021-12-10

## 2021-12-10 ENCOUNTER — TELEPHONE (OUTPATIENT)
Dept: CARDIOLOGY | Facility: MEDICAL CENTER | Age: 86
End: 2021-12-10

## 2021-12-10 DIAGNOSIS — I10 ESSENTIAL HYPERTENSION: ICD-10-CM

## 2021-12-10 DIAGNOSIS — I47.29 NSVT (NONSUSTAINED VENTRICULAR TACHYCARDIA) (HCC): ICD-10-CM

## 2021-12-10 RX ORDER — POTASSIUM CHLORIDE 20 MEQ/1
20 TABLET, EXTENDED RELEASE ORAL 2 TIMES DAILY
Qty: 90 TABLET | Refills: 1 | Status: SHIPPED | OUTPATIENT
Start: 2021-12-10 | End: 2021-12-10 | Stop reason: CLARIF

## 2021-12-10 RX ORDER — POTASSIUM CHLORIDE 20 MEQ/1
20 TABLET, EXTENDED RELEASE ORAL DAILY
Qty: 90 TABLET | Refills: 0 | Status: SHIPPED | OUTPATIENT
Start: 2021-12-10 | End: 2021-12-10

## 2021-12-10 NOTE — TELEPHONE ENCOUNTER
TW    Received call from Michela with [facility] stating pt's dtr-in-law reported a 5lb weight gain over the last week and there was a recent Rx change, pt was taken off of Lasix. Pt's spo2 is at 92% sitting and swelling in her ankles. Please call dtr-in-law Paras back to further discuss at [phone].    Thank you.

## 2021-12-10 NOTE — TELEPHONE ENCOUNTER
1. Caller Name: Sami [facility]                        Call Back Number: [phone] or [phone]     Patient has has a 5 pound weight gain in a week and sami believes patient is retaining water. Patient is ordered to be on 4L of oxygen however therapist noted patient is only using 3L and at 92 oxygen saturation. Would like to know if patient needs follow up and concerned about water retention

## 2021-12-10 NOTE — TELEPHONE ENCOUNTER
Medical Decision Making:  Today's Assessment / Status / Plan:      Recent hospitalizations have left her weak and debilitated.  We discussed the prolonged convalescence that occurs after eating brief hospital stays in her age group.  She also now is orthostatic and appears dehydrated.  She has no evidence of systolic or diastolic heart failure pulmonary hypertension or other cardiac issues that would require Lasix therefore I recommend discontinuing it.  If orthostasis is not improved with this and compression garments and I recommend consideration of either Florinef or midodrine or droxidopa initiation for symptomatic management.  She remains DNR/DNI.  Continue other medical therapy.  Follow-up in 3 months.      Called and LM for pt's CHELSEY Crain to call back and discuss. TW last OV notes above from 12/01/21.

## 2021-12-10 NOTE — TELEPHONE ENCOUNTER
"Received call back from Paras. She stated BP from home care visit this mornin/60 sitting  98/50 standing    O2 92% 3L NC, previously on 4L, pt denies SOB.  She reported swelling to legs, \"definitely retaining fluid and hasn't improved.\" Pt has had no other med changes other than stopping Lasix at last OV. She said pt is not in any emergent distress, main concern is the 5 lb weight gain this past week. Advised reached out to TW for recommendations and will call her back once received. She confirmed call back number and her 's (pt's son) on file. Ok to leave detailed VM with recs. She verbalized understanding and was appreciative.  "

## 2021-12-10 NOTE — TELEPHONE ENCOUNTER
Cardiology documented home health contacted them and the RN in cardiology is awaiting recommendations from her cardiologist.

## 2021-12-11 NOTE — TELEPHONE ENCOUNTER
RE: Lasix and recent K level  Received: Today  MARY Jamison R.N.  KDur 20 qd thx      Previous Messages       ----- Message -----   From: Dayna Morrissey R.N.   Sent: 12/10/2021   2:04 PM PST   To: Joshua Teresa M.D.   Subject: Lasix and recent K level                         Hi Dr. Teresa,     Lasix restarted due to wt gain and swelling. Pt's last K level 3.5 on 11/27/21 (trending down). Please advise if this is a concern and if you recommend KCl supplement, repeat labs?     Thanks,   Dayna       Called Paras and discussed above. Advised RX sent to [facility] pharmacy. Should pt have any BP concerns or develop symptoms over the weekend, they will call answering service to reach on-call provider. She stated she will continue daily weights for pt and monitor BP. Verbalized understanding and was very appreciative.

## 2021-12-13 ENCOUNTER — PATIENT MESSAGE (OUTPATIENT)
Dept: MEDICAL GROUP | Facility: PHYSICIAN GROUP | Age: 86
End: 2021-12-13
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
## 2021-12-13 DIAGNOSIS — R26.89 BALANCE PROBLEM: ICD-10-CM

## 2021-12-13 DIAGNOSIS — R53.81 DEBILITY: ICD-10-CM

## 2021-12-13 NOTE — TELEPHONE ENCOUNTER
"Received call from pt's CHELSEY Crain. She reported the following BP readings over the weekend:    Saturday AM  113/62 Sit  96/48 Standing    Saturday PM  116/60 Sit  80/48 Standing    Sunday AM  105/58 Sit  89/54 Standing    Sunday PM  80/52 Sit  99/50 Stand    Monday AM  100/61 sit  76/44 stand    Pt has no dizziness, light-headedness. She reported weak and wobbly when she walks. Per Paras, \"the day progresses and she has a little bit more stability. She's been like this for a long time.\"  Pt's reported O2 98% O2, Pulse 102 this AM, still retaining fluids, no weight from this morning however yesterday 141lbs, 143lbs day before. Paras said Home health will be coming Wednesday-Friday to complete PT. She is inquiring about med recommendations considering above BP readings. Advised will reach out to TW for recommendations and give her a call back. She verbalized understanding and was appreciative.    To Dr. Teresa - pt was restarted on Lasix 20mg daily and reports above BP readings. Please advise if any med recommendations. Thank you!  "

## 2021-12-16 ENCOUNTER — TELEPHONE (OUTPATIENT)
Dept: MEDICAL GROUP | Facility: PHYSICIAN GROUP | Age: 86
End: 2021-12-16

## 2021-12-16 NOTE — TELEPHONE ENCOUNTER
Please contact the home health nurse and have her contact cardiology as they are managing this condition.

## 2021-12-16 NOTE — TELEPHONE ENCOUNTER
Phone Number Called: Nicolasa      Message: Spoke with Nicolasa and advised her to contact cardiology, she will get the contact info from pts family.

## 2021-12-16 NOTE — TELEPHONE ENCOUNTER
VOICEMAIL  1. Caller Name: Nicolasa with [facility]                         Call Back Number: [phone]    2. Message: Called to report new Orthostatic readings, for Pt, wanted to know what next steps or how to manage per PCP instructions.  Readings were as follows:  Sitting 105/60; standing 70/47; standing 2  77/45  Please advise.

## 2021-12-17 NOTE — TELEPHONE ENCOUNTER
Received Voalte message from TW, recommends stopping Lasix and no other med changes. Pt should go to ER if continues to feel poorly. MAR updated. Called and left detailed VM for pt's CHELSEY Norton. Advised to call back if any further questions or concerns.

## 2021-12-17 NOTE — TELEPHONE ENCOUNTER
Meg García [facility] called, warm transfer from cardio coordinators. She wanted to give update of pt's BP readings today during HH visit. She said pt having orthostatic readings:    92/50 sitting  72/50 standing    Advised we've reached out to TW to clarify and get recommendations hopefully by tomorrow for any med recommendations. Advised will also notify Paras re: recommendations. Meg MARES verbalized understanding and was appreciative.

## 2021-12-23 ENCOUNTER — TELEPHONE (OUTPATIENT)
Dept: MEDICAL GROUP | Facility: PHYSICIAN GROUP | Age: 86
End: 2021-12-23

## 2021-12-27 ENCOUNTER — APPOINTMENT (OUTPATIENT)
Dept: SLEEP MEDICINE | Facility: MEDICAL CENTER | Age: 86
End: 2021-12-27
Payer: MEDICARE

## 2022-02-11 ENCOUNTER — HOSPITAL ENCOUNTER (OUTPATIENT)
Dept: LAB | Facility: MEDICAL CENTER | Age: 87
End: 2022-02-11
Attending: INTERNAL MEDICINE
Payer: MEDICARE

## 2022-02-11 ENCOUNTER — OFFICE VISIT (OUTPATIENT)
Dept: SLEEP MEDICINE | Facility: MEDICAL CENTER | Age: 87
End: 2022-02-11
Payer: MEDICARE

## 2022-02-11 VITALS
WEIGHT: 137 LBS | HEIGHT: 64 IN | BODY MASS INDEX: 23.39 KG/M2 | SYSTOLIC BLOOD PRESSURE: 90 MMHG | RESPIRATION RATE: 14 BRPM | HEART RATE: 94 BPM | OXYGEN SATURATION: 97 % | DIASTOLIC BLOOD PRESSURE: 50 MMHG

## 2022-02-11 DIAGNOSIS — G47.33 OSA (OBSTRUCTIVE SLEEP APNEA): ICD-10-CM

## 2022-02-11 LAB
ALBUMIN SERPL BCP-MCNC: 4 G/DL (ref 3.2–4.9)
ALBUMIN/GLOB SERPL: 1.4 G/DL
ALP SERPL-CCNC: 64 U/L (ref 30–99)
ALT SERPL-CCNC: 7 U/L (ref 2–50)
ANION GAP SERPL CALC-SCNC: 12 MMOL/L (ref 7–16)
AST SERPL-CCNC: 16 U/L (ref 12–45)
BASOPHILS # BLD AUTO: 0.6 % (ref 0–1.8)
BASOPHILS # BLD: 0.04 K/UL (ref 0–0.12)
BILIRUB SERPL-MCNC: 0.5 MG/DL (ref 0.1–1.5)
BUN SERPL-MCNC: 13 MG/DL (ref 8–22)
CALCIUM SERPL-MCNC: 9.6 MG/DL (ref 8.5–10.5)
CHLORIDE SERPL-SCNC: 102 MMOL/L (ref 96–112)
CHOLEST SERPL-MCNC: 153 MG/DL (ref 100–199)
CO2 SERPL-SCNC: 27 MMOL/L (ref 20–33)
CREAT SERPL-MCNC: 0.52 MG/DL (ref 0.5–1.4)
EOSINOPHIL # BLD AUTO: 0.17 K/UL (ref 0–0.51)
EOSINOPHIL NFR BLD: 2.6 % (ref 0–6.9)
ERYTHROCYTE [DISTWIDTH] IN BLOOD BY AUTOMATED COUNT: 45.9 FL (ref 35.9–50)
FASTING STATUS PATIENT QL REPORTED: NORMAL
GLOBULIN SER CALC-MCNC: 2.8 G/DL (ref 1.9–3.5)
GLUCOSE SERPL-MCNC: 90 MG/DL (ref 65–99)
HCT VFR BLD AUTO: 36 % (ref 37–47)
HDLC SERPL-MCNC: 50 MG/DL
HGB BLD-MCNC: 11.3 G/DL (ref 12–16)
IMM GRANULOCYTES # BLD AUTO: 0.01 K/UL (ref 0–0.11)
IMM GRANULOCYTES NFR BLD AUTO: 0.2 % (ref 0–0.9)
LDLC SERPL CALC-MCNC: 77 MG/DL
LYMPHOCYTES # BLD AUTO: 1.31 K/UL (ref 1–4.8)
LYMPHOCYTES NFR BLD: 19.9 % (ref 22–41)
MCH RBC QN AUTO: 29.9 PG (ref 27–33)
MCHC RBC AUTO-ENTMCNC: 31.4 G/DL (ref 33.6–35)
MCV RBC AUTO: 95.2 FL (ref 81.4–97.8)
MONOCYTES # BLD AUTO: 0.74 K/UL (ref 0–0.85)
MONOCYTES NFR BLD AUTO: 11.2 % (ref 0–13.4)
NEUTROPHILS # BLD AUTO: 4.31 K/UL (ref 2–7.15)
NEUTROPHILS NFR BLD: 65.5 % (ref 44–72)
NRBC # BLD AUTO: 0 K/UL
NRBC BLD-RTO: 0 /100 WBC
PLATELET # BLD AUTO: 262 K/UL (ref 164–446)
PMV BLD AUTO: 9.6 FL (ref 9–12.9)
POTASSIUM SERPL-SCNC: 3.9 MMOL/L (ref 3.6–5.5)
PROT SERPL-MCNC: 6.8 G/DL (ref 6–8.2)
RBC # BLD AUTO: 3.78 M/UL (ref 4.2–5.4)
SODIUM SERPL-SCNC: 141 MMOL/L (ref 135–145)
TRIGL SERPL-MCNC: 131 MG/DL (ref 0–149)
TSH SERPL DL<=0.005 MIU/L-ACNC: 1.75 UIU/ML (ref 0.38–5.33)
WBC # BLD AUTO: 6.6 K/UL (ref 4.8–10.8)

## 2022-02-11 PROCEDURE — 85025 COMPLETE CBC W/AUTO DIFF WBC: CPT

## 2022-02-11 PROCEDURE — 99214 OFFICE O/P EST MOD 30 MIN: CPT | Performed by: INTERNAL MEDICINE

## 2022-02-11 PROCEDURE — 80053 COMPREHEN METABOLIC PANEL: CPT

## 2022-02-11 PROCEDURE — 36415 COLL VENOUS BLD VENIPUNCTURE: CPT

## 2022-02-11 PROCEDURE — 80061 LIPID PANEL: CPT

## 2022-02-11 PROCEDURE — 84443 ASSAY THYROID STIM HORMONE: CPT

## 2022-02-11 RX ORDER — ZOLPIDEM TARTRATE 5 MG/1
5 TABLET ORAL NIGHTLY PRN
Qty: 2 TABLET | Refills: 0 | Status: SHIPPED | OUTPATIENT
Start: 2022-02-11 | End: 2022-02-12

## 2022-02-11 ASSESSMENT — FIBROSIS 4 INDEX: FIB4 SCORE: 1.86

## 2022-02-11 NOTE — PROGRESS NOTES
CC: Follow-up nocturnal hypoxemia        HPI:  Thalia Horton is a 89 y.o. referred I resident kindly referred by Boston Salazar M.D. and last seen 6/8/2021 when she declined to have an attended in lab sleep study or a home sleep study.  She had had an 8/19/2020 overnight oximetry which showed a brooke saturation of 72%, average saturation of 86%, and saturations less than 88% for 454 minutes with an oxygen desaturation index of 39.  The oximetric pattern was both clustered and sawtoothed suggestive of DOMINGO.    She continues to use nocturnal oxygen with benefit.  She is actually on O2 24/7 since a hospitalization in 2021.    She had no complaints of sleepiness, fatigue, or snoring.      Significant comorbidities and modifying factors include 11/21 admission for COPD exacerbation, pulmonary edema, hypertension, and acute hypoxemic pulmonary edema, need for SARS-CoV-2 booster, need for Pneumovax, need for Shingrix, and need for current influenza vaccination, pulmonary hypertension (RVSP 45 mmHg), ascending aortic aneurysm, nocturnal hypoxia, pharyngoesophageal dysphagia, nonsustained V. tach, prediabetes, DNR, nonsustained ventricular tachycardia, essential hypertension, TIA, acquired hypothyroidism, mixed dyslipidemia, GERD, former smoker, and COPD.    Patient Active Problem List    Diagnosis Date Noted   • Pulmonary hypertension (HCC) 11/26/2021   • Aortic aneurysm, descending (HCC) 11/25/2021   • Debility 11/24/2021   • Acute respiratory failure with hypoxia (HCC) 11/23/2021   • Anxiety and depression 11/23/2021   • Urinary urgency 11/02/2021   • Closed fracture of left upper extremity with routine healing 11/02/2021   • Pharyngoesophageal dysphagia 02/22/2021   • Nonsustained ventricular tachycardia (HCC) 08/13/2020   • Atrial tachycardia (HCC) 08/13/2020   • Nocturnal hypoxia 08/13/2020   • Prediabetes 07/24/2020   • DNR (do not resuscitate) 05/28/2020   • Near syncope 05/20/2020   • NSVT (nonsustained  ventricular tachycardia) ([facility])    • Fall 2020   • Excessive daytime sleepiness 2020   • Chronic pain of both shoulders 2020   • Balance problem 2020   • Essential hypertension 2020   • Neck pain 2020   • Mild cognitive impairment 2019   • TIA (transient ischemic attack) 2019   • History of angina pectoris 2018   • Osteoarthritis of multiple joints 2018   • Acquired hypothyroidism 2018   • Mixed hyperlipidemia 2018   • Atherosclerosis of both carotid arteries 2018   • Gastroesophageal reflux disease without esophagitis 2018   • Chronic obstructive pulmonary disease with acute exacerbation ([facility]) 2018       Past Medical History:   Diagnosis Date   • Chickenpox    • COPD (chronic obstructive pulmonary disease) ([facility])    • GERD (gastroesophageal reflux disease)    • Maori measles    • Heart palpitations    • Hyperlipidemia    • Osteoarthritis    • Thyroid disease         Past Surgical History:   Procedure Laterality Date   • HIP REPLACEMENT, TOTAL Left        Family History   Problem Relation Age of Onset   • Stroke Mother 69       Social History     Socioeconomic History   • Marital status:      Spouse name: Not on file   • Number of children: Not on file   • Years of education: Not on file   • Highest education level: Not on file   Occupational History   • Not on file   Tobacco Use   • Smoking status: Former Smoker     Packs/day: 1.00     Years: 55.00     Pack years: 55.00     Types: Cigarettes     Quit date: 2013     Years since quittin.5   • Smokeless tobacco: Never Used   Vaping Use   • Vaping Use: Never used   Substance and Sexual Activity   • Alcohol use: Yes     Alcohol/week: 6.0 oz     Types: 10 Glasses of wine per week     Comment: 1 -2    • Drug use: No   • Sexual activity: Never   Other Topics Concern   • Not on file   Social History Narrative   • Not on file     Social Determinants of Health      Financial Resource Strain:    • Difficulty of Paying Living Expenses: Not on file   Food Insecurity:    • Worried About Running Out of Food in the Last Year: Not on file   • Ran Out of Food in the Last Year: Not on file   Transportation Needs:    • Lack of Transportation (Medical): Not on file   • Lack of Transportation (Non-Medical): Not on file   Physical Activity:    • Days of Exercise per Week: Not on file   • Minutes of Exercise per Session: Not on file   Stress:    • Feeling of Stress : Not on file   Social Connections:    • Frequency of Communication with Friends and Family: Not on file   • Frequency of Social Gatherings with Friends and Family: Not on file   • Attends Latter day Services: Not on file   • Active Member of Clubs or Organizations: Not on file   • Attends Club or Organization Meetings: Not on file   • Marital Status: Not on file   Intimate Partner Violence:    • Fear of Current or Ex-Partner: Not on file   • Emotionally Abused: Not on file   • Physically Abused: Not on file   • Sexually Abused: Not on file   Housing Stability:    • Unable to Pay for Housing in the Last Year: Not on file   • Number of Places Lived in the Last Year: Not on file   • Unstable Housing in the Last Year: Not on file       Current Outpatient Medications   Medication Sig Dispense Refill   • zolpidem (AMBIEN) 5 MG Tab Take 1 Tablet by mouth at bedtime as needed for Sleep for up to 1 day. 2 Tablet 0   • NON SPECIFIED Please provide 1 hospital bed related to patient debility and mobility challenges 1 Each 0   • Misc. Devices Misc Humidifier bottle for oxygen. 1 Each 2   • Fluticasone Furoate-Vilanterol (BREO ELLIPTA) 100-25 MCG/INH AEROSOL POWDER, BREATH ACTIVATED Inhale 1 Puff every day. 60 Each 3   • aspirin EC (ECOTRIN) 81 MG Tablet Delayed Response Take 81 mg by mouth every day.     • multivitamin (THERAGRAN) Tab Take 1 Tablet by mouth every day.     • pantoprazole (PROTONIX) 40 MG Tablet Delayed Response Take 40 mg  "by mouth every day.     • atorvastatin (LIPITOR) 20 MG Tab TAKE 1 TABLET BY MOUTH EVERY DAY FOR CHOLESTEROL 90 Tablet 3   • escitalopram (LEXAPRO) 10 MG Tab TAKE 1 TABLET BY MOUTH EVERY DAY 90 Tablet 3   • levothyroxine (SYNTHROID) 25 MCG Tab TAKE 1 TABLET BY MOUTH EVERY MORNING ON AN EMPTY STOMACH FOR THYROID 90 Tablet 3   • fluticasone (FLONASE) 50 MCG/ACT nasal spray ADMINISTER 1 SPRAY INTO AFFECTED NOSTRIL(S) EVERY DAY 16 g 0   • Cholecalciferol (VITAMIN D3) 2000 UNIT Cap Take 2,000 Units by mouth every day.     • Cyanocobalamin (VITAMIN B-12) 1000 MCG Tab Take 1,000 mcg by mouth every day.     • albuterol 108 (90 Base) MCG/ACT Aero Soln inhalation aerosol INHALE 2 PUFFS BY MOUTH EVERY 6 HOURS AS NEEDED FOR SHORTNESS OF BREATH 1 Inhaler 6   • ezetimibe (ZETIA) 10 MG Tab TAKE 1 TABLET BY MOUTH EVERY DAY FOR CHOLESTEROL/ STROKE PREVENTION 90 tablet 3     No current facility-administered medications for this visit.    \"CURRENT RX\"    ALLERGIES: Penicillins and Penicillin g    ROS    Constitutional: Denies fever, chills, sweats,  weight loss, fatigue  Cardiovascular: Denies chest pain, tightness, palpitations, swelling in legs/feet, fainting, difficulty breathing when lying down but gets better when sitting up.   Respiratory: Denies shortness of breath, cough, sputum, wheezing, painful breathing, coughing up blood.   Sleep: per HPI  Gastrointestinal: Denies  difficulty swallowing, nausea, abdominal pain, diarrhea, constipation, heartburn.  Musculoskeletal: Denies painful joints, sore muscles, back pain.        PHYSICAL EXAM      BP (!) 90/50 (BP Location: Right arm, Patient Position: Sitting, BP Cuff Size: Adult)   Pulse 94   Resp 14   Ht 1.626 m (5' 4\")   Wt 62.1 kg (137 lb)   LMP  (LMP Unknown)   SpO2 97% Comment: 3lpm  BMI 23.52 kg/m²   Appearance: Well-nourished, well-developed, no acute distress  Eyes:  PERRLA, EOMI  ENMT: masked  Neck: Supple, trachea midline, no masses  Respiratory effort:  No " intercostal retractions or use of accessory muscles  Lung auscultation:  No wheezes rhonchi rubs or rales  Cardiac: No murmurs, rubs, or gallops; regular rhythm, normal rate; no edema  Abdomen:  No tenderness, no organomegaly.  Musculoskeletal:  Grossly normal; gait and station normal; digits and nails normal  Skin:  No rashes, petechiae, cyanosis  Neurologic: without focal signs; oriented to person, time, place, and purpose; judgement intact  Psychiatric:  No depression, anxiety, agitation      Medical Decision Making       The medical record was reviewed in its entirety including the referral notes, records from primary care, consultants notes, hospital records, lab, imaging, microbiology, immunology, and immunizations.  Care gaps identified and reviewed with the patient.    Diagnostic and titration nocturnal polysomnogram's, home sleep apnea tests, continuous nocturnal oximetry results, multiple sleep latency tests, and compliance reports reviewed.  1. DOMINGO (obstructive sleep apnea)  - Polysomnography, 4 or More; Future  - Referral to Pulmonary and Sleep Medicine  - zolpidem (AMBIEN) 5 MG Tab; Take 1 Tablet by mouth at bedtime as needed for Sleep for up to 1 day.  Dispense: 2 Tablet; Refill: 0      PLAN:   She was accompanied by her son today.  She will be best served by coming in for an attended nocturnal polysomnogram as she is still on oxygen which makes home sleep apnea testing possible.  She has requested a sleep aid for the test and will be prescribed Ambien.  She has been advised that break one of the 5 mg pills in half given her age and size.    Have advised the patient to follow up with the appropriate healthcare practitioners for all other medical problems and issues.    N95 mask wearing, handwashing, and social distancing protocols reviewed and encouraged.    Return for after sleep study.    Total time 30 minutes

## 2022-03-02 ENCOUNTER — SLEEP STUDY (OUTPATIENT)
Dept: SLEEP MEDICINE | Facility: MEDICAL CENTER | Age: 87
End: 2022-03-02
Attending: INTERNAL MEDICINE
Payer: MEDICARE

## 2022-03-02 DIAGNOSIS — G47.33 OSA (OBSTRUCTIVE SLEEP APNEA): ICD-10-CM

## 2022-03-02 PROCEDURE — 95811 POLYSOM 6/>YRS CPAP 4/> PARM: CPT | Performed by: INTERNAL MEDICINE

## 2022-03-03 NOTE — PROCEDURES
MONTAGE: Standard  STUDY TYPE: Split Night    RECORDING TECHNIQUE:   After the scalp was prepared, gold plated electrodes were applied to the scalp according to the International 10-20 System. EEG (electroencephalogram) was continuously monitored from the O1-M2, O2-M1, C3-M2, C4-M1, F3-M2, and F4-M1. EOGs (electrooculograms) were monitored by electrodes placed at the left and right outer canthi. Chin EMG (electromyogram) was monitored by electrodes placed on the mentalis and sub-mentalis muscles. Nasal and oral airflow were monitored using a triple port thermocouple as well as oronasal pressure transducer. Respiratory effort was measured by inductive plethysmography technology employing abdominal and thoracic belts. Blood oxygen saturation and pulse were monitored by pulse oximetry. Heart rhythm was monitored by surface electrocardiogram. Leg EMG was studied using surface electrodes placed on left and right anterior tibialis. A microphone was used to monitor tracheal sounds and snoring. Body position was monitored and documented by technician observation.   SCORING CRITERIA:   A modification of the AASM manual for scoring of sleep and associated events was used. Obstructive apneas were scored by cessation of airflow for at least 10 seconds with continuing respiratory effort. Central apneas were scored by cessation of airflow for at least 10 seconds with no respiratory effort. Hypopneas were scored by a 30% or more reduction in airflow for at least 10 seconds accompanied by arterial oxygen desaturation of 3% or an arousal. For CMS (Medicare) patients, per AASM rule 1B, hypopneas are scored by 30% with mild reduction in airflow for at least 10 seconds accompanied by arterial saturation decreased at 4%.    DIAGNOSTIC  Study start time was 10:04:48 PM. Diagnostic recording time was 162 minutes with a total sleep time of 126 minutes resulting in a sleep efficiency of 77.78%%. Sleep latency from the start of the study was  20 minutes and the latency from sleep to REM was 00 minutes. In total,90 arousals were scored for an arousal index of 42.9.  Respiratory:  There were a total of 10 apneas consisting of 10 obstructive apneas, 0 mixed apneas, and 0 central apneas. A total of 66 hypopneas were scored. The apnea index was 4.76 per hour and the hypopnea index was 31.43 per hour resulting in an overall AHI of 36.19. AHI during rem was 0.0 and AHI while supine was 68.06.  Oximetry:  There was a mean oxygen saturation of 87.0%. The minimum oxygen saturation during NREM sleep was69.0% and in REM was --. Time spent during sleep with oxygen saturations <88% was 59.5 minutes.   Cardiac:  The highest heart rate seen while awake was 126 BPM while the highest heart rate during sleep was 109 BPM with an average sleeping heart rate of 96 BPM.  Limb Movements:  There were a total of 336 PLMs during sleep, which resulted in a PLM index of 160.0. There were 45 PLMs associated with arousals which resulted in a PLMS arousal index of 21.4.    TREATMENT:  Treatment recording time was 4h 24.5m (264 minutes) with a total sleep time of 3h 43.0m (223 minutes) resulting in a sleep efficiency of 84.3%. Sleep latency from the start of treatment was 06 minutes and REM latency from sleep onset was 1h 43.0m. The patient had 80 arousals in total for an arousal index of 21.5.  Respiratory:   There were 13 apneas in total consisting of 9 obstructive apneas, 4 central apneas, and 0 mixed apneas for an apnea index of 3.50. The patient had 69 hypopneas in total, which resulted in a hypopnea index of 18.57. The overall AHI was 22.06, with a REM AHI of 35.29, and a supine AHI of 22.06.   Oximetry:  The mean SaO2 during treatment was 80.0%. The minimum oxygen saturation in NREM was67.0 % and in REM was 70.0%. Patient spent 247.2 minutes of TST with SaO2 <88%.  Cardiac:  The highest heart rate during sleep was 109 BPM with an average sleeping heart rate of 95BPM.  Limb  Movements:  There were a total of 464 PLMS during titration sleep time that resulted in an index of 124.8. There were 32 PLMS associated with arousals. This resulted in a PLM arousal index of 8.6.    Titration:   CPAP was tried from 5 to 8cm H2O. BiPAP was tried from 8/4 to 11/7cm H2O. O2 was added at 1Lpm fro diagnostic portion of the night.  This was a fully attended sleep study. This test was technically adequate.  The patient chose to use a small Airfit P10 mask.    Once the patient met the split-night protocol, the technician performed a positive airway pressure titration.  The technician initiated treatment with CPAP 5 cmH2O and increased the pressure to a maximum of 8 cmH2O. the apnea-hypopnea index normalized on CPAP at 6 and CPAP at 8 cm water but did not include any REM sleep but the patient slept in the supine position.  Because of persisting low saturations the patient was changed to bilevel.  The apnea-hypopnea index failed to normalize on any bilevel pressure with IPAP's ranging from 8-15 and EPAP's ranging from 4-8 cm water    The patient did best on CPAP 8 cm water with a resultant AHI of 0.0, a minimum saturation of 76%, and a mean saturation of 79%.  The patient also normalized AHI on CPAP at 6 cm water with an AHI of 3.87 and similarly visible saturations.        ASSESSMENT:    Severe obstructive sleep apnea hypopnea aggravated by the supine position - AHI 36.2, supine AHI 68.1  Severe nocturnal desaturation - brooke saturation 69% - saturations less than or equal to 88% for 47.2% of the diagnostic recording  Incomplete and inconsistent PAP titration: As noted above the patient normalized the AHI on CPAP at 6 and 8 cm water but had a persistently higher AHI on CPAP at 7 cm water.  The patient did not normalize the AHI on any tested bilevel pressure.      RECOMMENDATION:    Recommend auto titrating CPAP 5-15 cmH2O and O2 3 L/min using a small air fit P 10 mask  and heated humidification followed by  data card and clinical review in 6-8 weeks.        Dr. Boston Dahl MD

## 2022-03-13 ASSESSMENT — ENCOUNTER SYMPTOMS
RESPIRATORY SYMPTOMS COMMENTS: SOMETIMES
CHEST TIGHTNESS: 0
WHEEZING: 1
DYSPNEA AT REST: 0
SHORTNESS OF BREATH: 1
HEMOPTYSIS: 0

## 2022-03-16 ENCOUNTER — OFFICE VISIT (OUTPATIENT)
Dept: SLEEP MEDICINE | Facility: MEDICAL CENTER | Age: 87
End: 2022-03-16
Payer: MEDICARE

## 2022-03-16 ENCOUNTER — PATIENT MESSAGE (OUTPATIENT)
Dept: SLEEP MEDICINE | Facility: MEDICAL CENTER | Age: 87
End: 2022-03-16

## 2022-03-16 VITALS
WEIGHT: 148 LBS | BODY MASS INDEX: 25.27 KG/M2 | DIASTOLIC BLOOD PRESSURE: 64 MMHG | SYSTOLIC BLOOD PRESSURE: 112 MMHG | HEART RATE: 88 BPM | OXYGEN SATURATION: 97 % | HEIGHT: 64 IN

## 2022-03-16 DIAGNOSIS — J44.9 CHRONIC OBSTRUCTIVE PULMONARY DISEASE, UNSPECIFIED COPD TYPE (HCC): ICD-10-CM

## 2022-03-16 PROCEDURE — 99205 OFFICE O/P NEW HI 60 MIN: CPT | Mod: 25 | Performed by: STUDENT IN AN ORGANIZED HEALTH CARE EDUCATION/TRAINING PROGRAM

## 2022-03-16 ASSESSMENT — ENCOUNTER SYMPTOMS
HEMOPTYSIS: 0
FEVER: 0
FALLS: 1
SHORTNESS OF BREATH: 1
COUGH: 1
WHEEZING: 1
PALPITATIONS: 0
CHILLS: 0
SPUTUM PRODUCTION: 1

## 2022-03-16 ASSESSMENT — FIBROSIS 4 INDEX: FIB4 SCORE: 2.1

## 2022-03-16 ASSESSMENT — PATIENT HEALTH QUESTIONNAIRE - PHQ9
5. POOR APPETITE OR OVEREATING: 0 - NOT AT ALL
CLINICAL INTERPRETATION OF PHQ2 SCORE: 2
SUM OF ALL RESPONSES TO PHQ QUESTIONS 1-9: 3

## 2022-03-16 NOTE — PROGRESS NOTES
Pulmonary Clinic Note    Chief Complaint:  Chief Complaint   Patient presents with   • Establish Care     Referred by Dr Dahl for COPD   • Other     Labs 02/11/22, Echo 11/26/21, CT-CTA chest 11/24/21, CXR 11/26/21     HPI:   Thalia Horton is a very pleasant 89 y.o. female with history of tobacco smoking >50 pkyr who presents to pulmonary clinic to establish care for COPD. Unsure of how diagnosis was made - doesn't believe she ever did PFTs - she has been on multiple inhalers in past and is currently on Trelegy and states that has helped her breathing. She also reports sinus congestion and post nasal drip - uses flonase intermittently.   Patient was hospitalized in 11/2021 for possible COPD exacerbation vs fluid overload - treated with abx, steroids and lasix. She is not maintained on lasix outpatient bc of her orthostatic hypotension and history of falls - family watches her volume status and vitals closely. Since hospitalization, she has been on 3L O2.      Past Medical History:   Diagnosis Date   • Back pain    • Chickenpox    • Constipation    • COPD (chronic obstructive pulmonary disease) (HCC)    • Cough    • Diarrhea    • Frequent urination    • GERD (gastroesophageal reflux disease)    • English measles    • Hearing difficulty    • Heart palpitations    • Hoarseness, persistent    • Hyperlipidemia    • Osteoarthritis    • Painful joint    • Rhinitis    • Sore muscles    • Sputum production    • Thyroid disease    • Vision loss    • Wears glasses    • Wheezing        Past Surgical History:   Procedure Laterality Date   • HIP REPLACEMENT, TOTAL Left        Social History     Socioeconomic History   • Marital status:      Spouse name: Not on file   • Number of children: Not on file   • Years of education: Not on file   • Highest education level: Not on file   Occupational History   • Not on file   Tobacco Use   • Smoking status: Former Smoker     Packs/day: 1.00     Years: 55.00     Pack  years: 55.00     Types: Cigarettes     Quit date: 2013     Years since quittin.6   • Smokeless tobacco: Never Used   Vaping Use   • Vaping Use: Never used   Substance and Sexual Activity   • Alcohol use: Yes     Alcohol/week: 0.0 oz     Comment: 1 -2    • Drug use: No   • Sexual activity: Never   Other Topics Concern   • Not on file   Social History Narrative   • Not on file     Social Determinants of Health     Financial Resource Strain: Not on file   Food Insecurity: Not on file   Transportation Needs: Not on file   Physical Activity: Not on file   Stress: Not on file   Social Connections: Not on file   Intimate Partner Violence: Not on file   Housing Stability: Not on file          Family History   Problem Relation Age of Onset   • Stroke Mother 69       Current Outpatient Medications on File Prior to Visit   Medication Sig Dispense Refill   • NON SPECIFIED Please provide 1 hospital bed related to patient debility and mobility challenges 1 Each 0   • Misc. Devices Misc Humidifier bottle for oxygen. 1 Each 2   • Fluticasone Furoate-Vilanterol (BREO ELLIPTA) 100-25 MCG/INH AEROSOL POWDER, BREATH ACTIVATED Inhale 1 Puff every day. 60 Each 3   • aspirin EC (ECOTRIN) 81 MG Tablet Delayed Response Take 81 mg by mouth every day.     • multivitamin (THERAGRAN) Tab Take 1 Tablet by mouth every day.     • pantoprazole (PROTONIX) 40 MG Tablet Delayed Response Take 40 mg by mouth every day.     • atorvastatin (LIPITOR) 20 MG Tab TAKE 1 TABLET BY MOUTH EVERY DAY FOR CHOLESTEROL 90 Tablet 3   • escitalopram (LEXAPRO) 10 MG Tab TAKE 1 TABLET BY MOUTH EVERY DAY 90 Tablet 3   • levothyroxine (SYNTHROID) 25 MCG Tab TAKE 1 TABLET BY MOUTH EVERY MORNING ON AN EMPTY STOMACH FOR THYROID 90 Tablet 3   • fluticasone (FLONASE) 50 MCG/ACT nasal spray ADMINISTER 1 SPRAY INTO AFFECTED NOSTRIL(S) EVERY DAY 16 g 0   • Cholecalciferol (VITAMIN D3) 2000 UNIT Cap Take 2,000 Units by mouth every day.     • Cyanocobalamin (VITAMIN B-12)  "1000 MCG Tab Take 1,000 mcg by mouth every day.     • albuterol 108 (90 Base) MCG/ACT Aero Soln inhalation aerosol INHALE 2 PUFFS BY MOUTH EVERY 6 HOURS AS NEEDED FOR SHORTNESS OF BREATH 1 Inhaler 6     No current facility-administered medications on file prior to visit.       Allergies: Penicillins and Penicillin g      ROS:   Review of Systems   Constitutional: Positive for malaise/fatigue. Negative for chills and fever.   HENT: Positive for congestion.    Respiratory: Positive for cough, sputum production, shortness of breath and wheezing. Negative for hemoptysis.    Cardiovascular: Negative for chest pain and palpitations.   Musculoskeletal: Positive for falls.       Vitals:  /64 (BP Location: Left arm, Patient Position: Sitting, BP Cuff Size: Adult)   Pulse 88   Ht 1.626 m (5' 4\")   Wt 67.1 kg (148 lb)   SpO2 97%     Physical Exam:  Physical Exam  Vitals and nursing note reviewed.   Constitutional:       General: She is not in acute distress.     Appearance: Normal appearance. She is not ill-appearing or toxic-appearing.   HENT:      Head: Normocephalic.      Nose: Nose normal.      Mouth/Throat:      Mouth: Mucous membranes are moist.   Eyes:      Conjunctiva/sclera: Conjunctivae normal.   Cardiovascular:      Rate and Rhythm: Normal rate and regular rhythm.   Pulmonary:      Effort: Pulmonary effort is normal. No respiratory distress.      Breath sounds: No wheezing.   Abdominal:      Palpations: Abdomen is soft.   Musculoskeletal:         General: No deformity or signs of injury. Normal range of motion.   Skin:     General: Skin is warm and dry.   Neurological:      General: No focal deficit present.      Mental Status: She is alert. Mental status is at baseline.   Psychiatric:         Mood and Affect: Mood normal.         Behavior: Behavior normal.         Data:  3/2/22 PSG: severe DOMINGO     CTPA 11/23/21  1.  No CT evidence of pulmonary embolism  2.  Aspiration with new mediastinal adenopathy that " ist most likely reactive  3.  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  Multichamber cardiac enlargement similar to comparison    TTE 11/26/21  Mild concentric left ventricular hypertrophy. Normal left ventricular   size, systolic, and diastolic function.  Moderately dilated left atrium.  Mild mitral valve regurgitation.  Mild tricuspid regurgitation.  Estimated right ventricular systolic pressure is 45 mmHg; mild   pulmonary hypertension.   No pericardial effusion.    TTE 3/22/21  The left ventricle was normal in size.  Normal regional wall motion.  Left ventricular ejection fraction is visually estimated to be 55%.  Moderate mitral regurgitation.  Normal inferior vena cava size and inspiratory collapse.  Compared to the report of the prior study done - the MR is better   characterized as moderate on this study, previously felt to be mild-  Moderate.    Assessment/Plan:    Problem List Items Addressed This Visit     Chronic obstructive pulmonary disease (HCC)     Patient with hypoxemic respiratory failure since hospitalization in 11/2021 for volume overload and possible COPD exacerbation. Was apparently diagnosed with COPD 10+ years ago - no PFTs and CT chest w/o emphysema but has extensive smoking history and has found symptomatic benefit with Trelegy. The cause of her dyspnea and hypoxemia are multifactorial - also contributory are her heart failure, pulmonary hypertension, severe DOMINGO.    - continue trelegy  - albuterol prn  - sinus rinses, saline nasal spray and flonase for post nasal drip symptoms  - PFTs if able to do it  - f/u with sleep medicine - PSG 3/2/22 shows severe DOMINGO - hasn't started CPAP yet  - f/u with cardiology for heart failure management         Relevant Orders    PULMONARY FUNCTION TESTS -Test requested: Complete Pulmonary Function Test    Multiple Oximetry    DME O2 New Set Up          Return in about 6 months (around 9/16/2022).       Time spent in  record review prior to patient arrival, reviewing results, and in face-to-face encounter totaled 60 min, excluding any procedures if performed.    Lisset Madsen MD  Pulmonary and Critical Care Medicine  [facility]

## 2022-03-16 NOTE — PROCEDURES
Multi-Ox Readings  Multi Ox #1 Room air   O2 sat % at rest 93   O2 sat % on exertion 87   O2 sat average on exertion     Multi Ox #2 2 LPM   O2 sat % at rest 95   O2 sat % on exertion 93   O2 sat average on exertion       Oxygen Use 3   Oxygen Frequency 24/7   Duration of need     Is the patient mobile within the home?     CPAP Use?     BIPAP Use?     Servo Titration

## 2022-03-16 NOTE — ASSESSMENT & PLAN NOTE
Patient with hypoxemic respiratory failure since hospitalization in 11/2021 for volume overload and possible COPD exacerbation. Was apparently diagnosed with COPD 10+ years ago - no PFTs and CT chest w/o emphysema but has extensive smoking history and has found symptomatic benefit with Trelegy. The cause of her dyspnea and hypoxemia are multifactorial - also contributory are her heart failure, pulmonary hypertension, severe DOMINGO.    - continue trelegy  - albuterol prn  - sinus rinses, saline nasal spray and flonase for post nasal drip symptoms  - PFTs if able to do it  - f/u with sleep medicine - PSG 3/2/22 shows severe DOMINGO - hasn't started CPAP yet  - f/u with cardiology for heart failure management

## 2022-03-17 NOTE — TELEPHONE ENCOUNTER
From: Thalia Horton  To: Physician Lisset Madsen  Sent: 3/16/2022 4:42 PM PDT  Subject: Today’s appointment     Hello,  My mother had a visit with you today and unfortunately we left one or her black sneakers in the exam room.   Can I pick it up tomorrow perhaps?  Thank you,  Freddie

## 2022-03-21 ENCOUNTER — OFFICE VISIT (OUTPATIENT)
Dept: SLEEP MEDICINE | Facility: MEDICAL CENTER | Age: 87
End: 2022-03-21
Payer: MEDICARE

## 2022-03-21 VITALS
HEART RATE: 91 BPM | OXYGEN SATURATION: 95 % | HEIGHT: 64 IN | SYSTOLIC BLOOD PRESSURE: 124 MMHG | RESPIRATION RATE: 12 BRPM | DIASTOLIC BLOOD PRESSURE: 70 MMHG | BODY MASS INDEX: 24.24 KG/M2 | WEIGHT: 142 LBS

## 2022-03-21 DIAGNOSIS — G47.34 NOCTURNAL HYPOXIA: ICD-10-CM

## 2022-03-21 DIAGNOSIS — J44.9 CHRONIC OBSTRUCTIVE PULMONARY DISEASE, UNSPECIFIED COPD TYPE (HCC): ICD-10-CM

## 2022-03-21 DIAGNOSIS — G47.33 OSA (OBSTRUCTIVE SLEEP APNEA): Primary | ICD-10-CM

## 2022-03-21 PROCEDURE — 99213 OFFICE O/P EST LOW 20 MIN: CPT | Performed by: STUDENT IN AN ORGANIZED HEALTH CARE EDUCATION/TRAINING PROGRAM

## 2022-03-21 ASSESSMENT — FIBROSIS 4 INDEX: FIB4 SCORE: 2.1

## 2022-03-21 NOTE — PATIENT INSTRUCTIONS
"CPAP and BPAP Information  CPAP and BPAP are methods of helping a person breathe with the use of air pressure. CPAP stands for \"continuous positive airway pressure.\" BPAP stands for \"bi-level positive airway pressure.\" In both methods, air is blown through your nose or mouth and into your air passages to help you breathe well.  CPAP and BPAP use different amounts of pressure to blow air. With CPAP, the amount of pressure stays the same while you breathe in and out. With BPAP, the amount of pressure is increased when you breathe in (inhale) so that you can take larger breaths. Your health care provider will recommend whether CPAP or BPAP would be more helpful for you.  Why are CPAP and BPAP treatments used?  CPAP or BPAP can be helpful if you have:  · Sleep apnea.  · Chronic obstructive pulmonary disease (COPD).  · Heart failure.  · Medical conditions that weaken the muscles of the chest including muscular dystrophy, or neurological diseases such as amyotrophic lateral sclerosis (ALS).  · Other problems that cause breathing to be weak, abnormal, or difficult.  CPAP is most commonly used for obstructive sleep apnea (DOMINGO) to keep the airways from collapsing when the muscles relax during sleep.  How is CPAP or BPAP administered?  Both CPAP and BPAP are provided by a small machine with a flexible plastic tube that attaches to a plastic mask. You wear the mask. Air is blown through the mask into your nose or mouth. The amount of pressure that is used to blow the air can be adjusted on the machine. Your health care provider will determine the pressure setting that should be used based on your individual needs.  When should CPAP or BPAP be used?  In most cases, the mask only needs to be worn during sleep. Generally, the mask needs to be worn throughout the night and during any daytime naps. People with certain medical conditions may also need to wear the mask at other times when they are awake. Follow instructions from your " health care provider about when to use the machine.  What are some tips for using the mask?    · Because the mask needs to be snug, some people feel trapped or closed-in (claustrophobic) when first using the mask. If you feel this way, you may need to get used to the mask. One way to do this is by holding the mask loosely over your nose or mouth and then gradually applying the mask more snugly. You can also gradually increase the amount of time that you use the mask.  · Masks are available in various types and sizes. Some fit over your mouth and nose while others fit over just your nose. If your mask does not fit well, talk with your health care provider about getting a different one.  · If you are using a mask that fits over your nose and you tend to breathe through your mouth, a chin strap may be applied to help keep your mouth closed.  · The CPAP and BPAP machines have alarms that may sound if the mask comes off or develops a leak.  · If you have trouble with the mask, it is very important that you talk with your health care provider about finding a way to make the mask easier to tolerate. Do not stop using the mask. Stopping the use of the mask could have a negative impact on your health.  What are some tips for using the machine?  · Place your CPAP or BPAP machine on a secure table or stand near an electrical outlet.  · Know where the on/off switch is located on the machine.  · Follow instructions from your health care provider about how to set the pressure on your machine and when you should use it.  · Do not eat or drink while the CPAP or BPAP machine is on. Food or fluids could get pushed into your lungs by the pressure of the CPAP or BPAP.  · Do not smoke. Tobacco smoke residue can damage the machine.  · For home use, CPAP and BPAP machines can be rented or purchased through home health care companies. Many different brands of machines are available. Renting a machine before purchasing may help you find out  which particular machine works well for you.  · Keep the CPAP or BPAP machine and attachments clean. Ask your health care provider for specific instructions.  Get help right away if:  · You have redness or open areas around your nose or mouth where the mask fits.  · You have trouble using the CPAP or BPAP machine.  · You cannot tolerate wearing the CPAP or BPAP mask.  · You have pain, discomfort, and bloating in your abdomen.  Summary  · CPAP and BPAP are methods of helping a person breathe with the use of air pressure.  · Both CPAP and BPAP are provided by a small machine with a flexible plastic tube that attaches to a plastic mask.  · If you have trouble with the mask, it is very important that you talk with your health care provider about finding a way to make the mask easier to tolerate.  This information is not intended to replace advice given to you by your health care provider. Make sure you discuss any questions you have with your health care provider.  Document Released: 09/15/2005 Document Revised: 04/08/2020 Document Reviewed: 11/06/2017  Elsevier Patient Education © 2020 [employer]

## 2022-03-21 NOTE — PROGRESS NOTES
"[facility] Follow-up Visit    CC: Follow-up to discuss recent sleep study results      HPI:  Thalia Horton is a 89 y.o.female  with pulmonary hypertension, anxiety and depression, dysphagia, history of TIA, hypertension, COPD on supplemental oxygen, seasonal allergies, hypothyroidism, GERD, and severe obstructive sleep apnea.  Presents today to discuss sleep study results.    She follows with pulmonology who is managing her COPD.    She is accompanied by her son Freddie at today's visit.    She reports night of the sleep study went well.  She was able to sleep with the CPAP mask.  Overall she has no concerns regarding the sleep study.  When she does wake up the morning her family has noticed that it takes her a few hours in order for her to truly \"get going\".  There is hope that improving her breathing in her sleep will help with this.  In addition she is dealing with a COPD exacerbation and is currently on antibiotics and steroids.    Sleep History  PSG split-night study on 3/2/2022 showed severe obstructive sleep apnea overall AHI of 36, minimum oxygen saturation of 69% and time at or below 89% saturation of 59.5 minutes during diagnostic portion with 1 L supplemental oxygen added.  During treatment portion patient responded to CPAP therapy and was trialed on BiPAP.  Recommendations from titration portion were CPAP 5-15 with supplemental oxygen 3 L/min    Patient Active Problem List    Diagnosis Date Noted   • Chronic obstructive pulmonary disease (HCC) 03/16/2022   • Pulmonary hypertension (HCC) 11/26/2021   • Aortic aneurysm, descending (HCC) 11/25/2021   • Debility 11/24/2021   • Acute respiratory failure with hypoxia (HCC) 11/23/2021   • Anxiety and depression 11/23/2021   • Urinary urgency 11/02/2021   • Closed fracture of left upper extremity with routine healing 11/02/2021   • Pharyngoesophageal dysphagia 02/22/2021   • Nonsustained ventricular tachycardia (HCC) 08/13/2020   • Atrial " tachycardia ([facility]) 08/13/2020   • Nocturnal hypoxia 08/13/2020   • Prediabetes 07/24/2020   • DNR (do not resuscitate) 05/28/2020   • Near syncope 05/20/2020   • NSVT (nonsustained ventricular tachycardia) ([facility])    • Fall 05/08/2020   • Excessive daytime sleepiness 04/29/2020   • Chronic pain of both shoulders 04/09/2020   • Balance problem 04/09/2020   • Essential hypertension 03/26/2020   • Neck pain 01/24/2020   • Mild cognitive impairment 04/02/2019   • TIA (transient ischemic attack) 01/22/2019   • History of angina pectoris 09/13/2018   • Osteoarthritis of multiple joints 07/19/2018   • Acquired hypothyroidism 07/19/2018   • Mixed hyperlipidemia 07/19/2018   • Atherosclerosis of both carotid arteries 07/19/2018   • Gastroesophageal reflux disease without esophagitis 07/19/2018   • Chronic obstructive pulmonary disease with acute exacerbation ([facility]) 07/19/2018       Past Medical History:   Diagnosis Date   • Back pain    • Chickenpox    • Constipation    • COPD (chronic obstructive pulmonary disease) ([facility])    • Cough    • Diarrhea    • Frequent urination    • GERD (gastroesophageal reflux disease)    • [place] measles    • Hearing difficulty    • Heart palpitations    • Hoarseness, persistent    • Hyperlipidemia    • Osteoarthritis    • Painful joint    • Rhinitis    • Sore muscles    • Sputum production    • Thyroid disease    • Vision loss    • Wears glasses    • Wheezing         Past Surgical History:   Procedure Laterality Date   • HIP REPLACEMENT, TOTAL Left        Family History   Problem Relation Age of Onset   • Stroke Mother 69       Social History     Socioeconomic History   • Marital status:      Spouse name: Not on file   • Number of children: Not on file   • Years of education: Not on file   • Highest education level: Not on file   Occupational History   • Not on file   Tobacco Use   • Smoking status: Former Smoker     Packs/day: 1.00     Years: 55.00     Pack years: 55.00     Types:  Cigarettes     Quit date: 2013     Years since quittin.6   • Smokeless tobacco: Never Used   Vaping Use   • Vaping Use: Never used   Substance and Sexual Activity   • Alcohol use: Yes     Alcohol/week: 0.0 oz     Comment: 1 -2    • Drug use: No   • Sexual activity: Never   Other Topics Concern   • Not on file   Social History Narrative   • Not on file     Social Determinants of Health     Financial Resource Strain: Not on file   Food Insecurity: Not on file   Transportation Needs: Not on file   Physical Activity: Not on file   Stress: Not on file   Social Connections: Not on file   Intimate Partner Violence: Not on file   Housing Stability: Not on file       Current Outpatient Medications   Medication Sig Dispense Refill   • NON SPECIFIED Please provide 1 hospital bed related to patient debility and mobility challenges 1 Each 0   • Misc. Devices Misc Humidifier bottle for oxygen. 1 Each 2   • Fluticasone Furoate-Vilanterol (BREO ELLIPTA) 100-25 MCG/INH AEROSOL POWDER, BREATH ACTIVATED Inhale 1 Puff every day. 60 Each 3   • aspirin EC (ECOTRIN) 81 MG Tablet Delayed Response Take 81 mg by mouth every day.     • multivitamin (THERAGRAN) Tab Take 1 Tablet by mouth every day.     • pantoprazole (PROTONIX) 40 MG Tablet Delayed Response Take 40 mg by mouth every day.     • atorvastatin (LIPITOR) 20 MG Tab TAKE 1 TABLET BY MOUTH EVERY DAY FOR CHOLESTEROL 90 Tablet 3   • escitalopram (LEXAPRO) 10 MG Tab TAKE 1 TABLET BY MOUTH EVERY DAY 90 Tablet 3   • levothyroxine (SYNTHROID) 25 MCG Tab TAKE 1 TABLET BY MOUTH EVERY MORNING ON AN EMPTY STOMACH FOR THYROID 90 Tablet 3   • fluticasone (FLONASE) 50 MCG/ACT nasal spray ADMINISTER 1 SPRAY INTO AFFECTED NOSTRIL(S) EVERY DAY 16 g 0   • Cholecalciferol (VITAMIN D3) 2000 UNIT Cap Take 2,000 Units by mouth every day.     • Cyanocobalamin (VITAMIN B-12) 1000 MCG Tab Take 1,000 mcg by mouth every day.     • albuterol 108 (90 Base) MCG/ACT Aero Soln inhalation aerosol INHALE 2  "PUFFS BY MOUTH EVERY 6 HOURS AS NEEDED FOR SHORTNESS OF BREATH 1 Inhaler 6     No current facility-administered medications for this visit.        ALLERGIES: Penicillins and Penicillin g    ROS  Constitutional: Denies fevers, Denies weight changes  Ears/Nose/Throat/Mouth: Denies nasal congestion or sore throat   Cardiovascular: Denies chest pain  Respiratory: Denies shortness of breath, Denies cough  Gastrointestinal/Hepatic: Denies nausea, vomiting  Sleep: see HPI      PHYSICAL EXAM  /70 (BP Location: Left arm, Patient Position: Sitting, BP Cuff Size: Adult)   Pulse 91   Resp 12   Ht 1.626 m (5' 4\")   Wt 64.4 kg (142 lb)   LMP  (LMP Unknown)   SpO2 95%   BMI 24.37 kg/m²   Appearance: Well-nourished, well-developed, no acute distress  Eyes:  No scleral icterus , EOMI  ENMT: masked  Musculoskeletal:  Grossly normal;   Skin:  No rashes, petechiae, cyanosis  Neurologic: without focal signs; oriented to person, time, place, and purpose; judgement intact      Medical Decision Making   Assessment and Plan  Thalia Horton is a 89 y.o.female  with pulmonary hypertension, anxiety and depression, dysphagia, history of TIA, hypertension, COPD on supplemental oxygen, seasonal allergies, hypothyroidism, GERD, and severe obstructive sleep apnea.  Presents today to discuss sleep study results.    The medical record was reviewed.    Obstructive sleep apnea   Reviewed recent PSG split-night with patient showing an AHI of 36, and min Oxygen saturation of 69%.   Based on sleep study and symptoms meets criteria for severe obstructive sleep apnea.   We discussed the pathophysiology of obstructive sleep apnea (DOMINGO) and risk factors for the disease. We also discussed possible consequences of untreated DOMINGO, including excessive daytime sleepiness and fatigue, cognitive dysfunction, cardiovascular complications such as elevated blood pressure, heart attacks, cardiac arrhythmias, and strokes. We discussed how DOMINGO typically " gets worse with age. We discussed treatment options for DOMINGO, including the gold standard therapy (PAP), alternative options such as a mandibular advancement device (custom-made oral appliances) and surgeries. We will proceed CPAP therapy with bleed in supplemental oxygen.    Discussed importance of treating her sleep apnea given her diagnosis of COPD.  Discussed COPD DOMINGO overlap syndrome.    RECOMMENDATIONS  -Start Auto CPAP at pressures 5-10 cm H2O with 3 L/min supplemental oxygen  -Discussed importance of adherence/compliance   -Prescription generated for supplies   -Patient counseled to avoid driving when sleepy. Encouraged to anticipate sleepiness, consider taking a 10 min nap prior to driving, alternate with another , or pull over if sleepy while driving  -Advised to contact our office or myself with any questions via [employer]    Positive airway pressure will favorably impact many of the adverse conditions and effects provoked by DOMINGO.    Have advised the patient to follow up with the appropriate healthcare practitioners for all other medical problems and issues.    Return for 1-2 months after starting CPAP. .      Please note portions of this record was created using voice recognition software. I have made every reasonable attempt to correct obvious errors, but I expect that there are errors of grammar and possibly content I did not discover before finalizing the note.

## 2022-04-20 ENCOUNTER — NON-PROVIDER VISIT (OUTPATIENT)
Dept: SLEEP MEDICINE | Facility: MEDICAL CENTER | Age: 87
End: 2022-04-20
Attending: STUDENT IN AN ORGANIZED HEALTH CARE EDUCATION/TRAINING PROGRAM
Payer: MEDICARE

## 2022-04-20 VITALS — WEIGHT: 144 LBS | BODY MASS INDEX: 23.99 KG/M2 | HEIGHT: 65 IN

## 2022-04-20 DIAGNOSIS — J44.9 CHRONIC OBSTRUCTIVE PULMONARY DISEASE, UNSPECIFIED COPD TYPE (HCC): ICD-10-CM

## 2022-04-20 PROCEDURE — 94060 EVALUATION OF WHEEZING: CPT | Performed by: INTERNAL MEDICINE

## 2022-04-20 PROCEDURE — 94729 DIFFUSING CAPACITY: CPT | Performed by: INTERNAL MEDICINE

## 2022-04-20 PROCEDURE — 94726 PLETHYSMOGRAPHY LUNG VOLUMES: CPT | Performed by: INTERNAL MEDICINE

## 2022-04-20 ASSESSMENT — PULMONARY FUNCTION TESTS
FVC_PERCENT_PREDICTED: 98
FEV1_PERCENT_PREDICTED: 94
FEV1: 1.7
FEV1_LLN: 1.43
FEV1/FVC_PERCENT_PREDICTED: 98
FVC_PERCENT_PREDICTED: 94
FEV1/FVC_PERCENT_CHANGE: 0
FEV1/FVC: 74
FEV1_PREDICTED: 1.71
FEV1/FVC_PERCENT_LLN: 63
FEV1/FVC: 74.89
FVC_LLN: 1.92
FVC: 2.18
FEV1_PERCENT_CHANGE: 5
FEV1/FVC_PREDICTED: 76
FEV1/FVC_PERCENT_PREDICTED: 98
FEV1/FVC_PERCENT_CHANGE: 125
FEV1_PERCENT_CHANGE: 4
FEV1/FVC: 74
FEV1/FVC_PERCENT_PREDICTED: 101
FEV1/FVC: 75
FEV1/FVC_PERCENT_PREDICTED: 74
FEV1_PERCENT_PREDICTED: 99
FEV1: 1.62
FEV1/FVC_PERCENT_PREDICTED: 100
FVC: 2.27
FVC_PREDICTED: 2.3

## 2022-04-20 ASSESSMENT — FIBROSIS 4 INDEX: FIB4 SCORE: 2.1

## 2022-04-20 NOTE — PROCEDURES
Technician: Briseyda Morales RRT, CPFT  Good patient effort & cooperation.  The results of this test meet the ATS/ERS standards for acceptability & reproducibility.  Test was performed on the epacube Body Plethysmograph-Elite DX system.  Predicted equations for Spirometry are GLI-2012, ITS for lung volumes, and GLI-2017 for DLCO.  The DLCO was uncorrected for Hgb.  A bronchodilator of Ventolin HFA -2puffs via spacer administered.  DLCO performed during dilation period.    Interpretation;   Baseline spirometry shows normal airflows.  There is trend toward bronchodilator response but does not meet ATS criteria.  Lung volumes are within normal limits.  Diffusion capacity is measured but there is no estimated normal to compare to.  Pulmonary function testing as able to evaluate is within normal limits.  There is concavity to the expiratory flow volume loop suggesting some obstruction and trend toward bronchodilator response which may be consistent with stated history of COPD versus asthma.  Correlate clinically.

## 2022-11-29 NOTE — ASSESSMENT & PLAN NOTE
In nature.  Stable.  Blood pressure today is 124/60 and patient states that she has been feeling well recently she has continued to have some intermittent afternoon fatigue but states that her blood pressure has been doing well and that she is not having any chest pain, palpitations, dizziness, blurry vision.  
Patient has not completed recommended sleep study and does not wish to complete that at this time.  
Running in nature.  A1c is improved from 6.3-5.8 and patient is overall doing well.  She states she has made some small modifications in her diet which have been effective.  
This is a continued issue.  Patient states that since evaluation with speech therapy she has been doing much better as she has been following their advice for changes to improve her swallow.  Patient does not want a referral for vocal hygiene at this time but does wish to follow through with the referral they recommended to gastroenterology for further evaluation.  Patient states that considering the increased care she has been taking with swelling she has been doing much better and feeling overall well.  
No